# Patient Record
Sex: MALE | Race: WHITE | NOT HISPANIC OR LATINO | Employment: OTHER | URBAN - METROPOLITAN AREA
[De-identification: names, ages, dates, MRNs, and addresses within clinical notes are randomized per-mention and may not be internally consistent; named-entity substitution may affect disease eponyms.]

---

## 2017-01-02 ENCOUNTER — HOSPITAL ENCOUNTER (OUTPATIENT)
Dept: PHYSICAL THERAPY | Facility: HOSPITAL | Age: 67
Setting detail: THERAPIES SERIES
Discharge: HOME | End: 2017-01-02

## 2017-01-02 DIAGNOSIS — Z96.611 S/P REVERSE TOTAL SHOULDER ARTHROPLASTY, RIGHT: Primary | ICD-10-CM

## 2017-01-02 PROCEDURE — 97110 THERAPEUTIC EXERCISES: CPT

## 2017-01-02 PROCEDURE — 97140 MANUAL THERAPY 1/> REGIONS: CPT

## 2017-01-02 NOTE — PROGRESS NOTES
Outpatient Physical Therapy Ortho Treatment Note  [facility]     Patient Name: Russell Christine  : 1950  MRN: [id number]  Today's Date: 2017      Visit Date: 2017    Visit Dx:    ICD-10-CM ICD-9-CM   1. S/p reverse total shoulder arthroplasty, right Z96.611 V43.61       Patient Active Problem List   Diagnosis   • S/p reverse total shoulder arthroplasty        Past Medical History   Diagnosis Date   • Arthritis    • BPH (benign prostatic hyperplasia)    • Depression    • Dizziness      SINCE CRANIOTOMY YRS AGO     • GERD (gastroesophageal reflux disease)    • Hyperlipemia    • Hypertension    • Seizure      ON GABAPENTIN  LAST ONE 5 YRS AGO  HS CRANIOTOMY   • Shoulder pain, right    • Sleep apnea      DOESNT WEAR MACHINE        Past Surgical History   Procedure Laterality Date   • Craniotomy       BENIGN TUMOR BEHIND LEFT EYE     • Lumbar fusion       X 2   • Cervical fusion     • Pr reconstr total shoulder implant Right 10/12/2016     Procedure: RT TOTAL SHOULDER REVERSE  ARTHROPLASTY;  Surgeon: Gilberto Pickard MD;  [facility] [place] [facility];  Service: Orthopedics                             PT Assessment/Plan       17 1609 16 0920 16 0800    PT Assessment    Assessment Comments Pt notes his pain is 0 today.  He has tightness limiting full passive shoulder flexion and has compensatory movement with active flexion. Added lateral deltoid lifts below 90 today, tolerated well.  -JA Pain in right shoulder is well controlled. Working on increasing end range of motion and increase functional strengthening  -WS pt. with 0 pain just a little stiffness  pt, to MD around  or .   -TR    PT Plan    PT Plan Comments continue strengthening including lateral deltoid  -JA  continue with ther ex manual per protocol.   -TR      User Key  (r) = Recorded By, (t) = Taken By, (c) = Cosigned By    Initials Name Provider Type    SHEREEN Upton, PT Physical Therapist  "   TR Jeff Brown, [facility] Physical Therapy Assistant    WS Vicente Crane, RAJENDRA Physical Therapy Assistant                Modalities       01/02/17 0800          Ice    Ice Applied No  -SHEREEN        User Key  (r) = Recorded By, (t) = Taken By, (c) = Cosigned By    Initials Name Provider Type    SHEREEN Upton, PT Physical Therapist                Exercises       01/02/17 0800          Subjective Comments    Subjective Comments I haven't needed any pain medicine.  -SHEREEN      Subjective Pain    Able to rate subjective pain? yes  -SHEREEN      Pre-Treatment Pain Level 0  -SEHREEN      Exercise 1    Exercise Name 1 See flowsheet in chart for exercise details.   -SHEREEN        User Key  (r) = Recorded By, (t) = Taken By, (c) = Cosigned By    Initials Name Provider Type    SHEREEN Upton, PT Physical Therapist                        Manual Rx (last 36 hours)      Manual Treatments       01/02/17 0800          Manual Rx 1    Manual Rx 1 Location R shoulder, supine  -SHEREEN      Manual Rx 1 Type PROM to tolerance flexion, scaption, ER, IR with intermittent gentle oscillations and inferior distraction, Isometric IR/ER (neutral) 5\" x 10 ea (PT providing resistance to monitor), STM ant shoulder to soften tissue adjacent to healing incision.    -      Manual Rx 2    Manual Rx 2 Location R shoulder, arm 90 flexion  -      Manual Rx 2 Type Rhythmic stabilization at 90 degrees, and slow reversal D2  -      Manual Rx 3    Manual Rx 3 Location R shoulder, supine arm at 80 abd   -SHEREEN      Manual Rx 3 Type ER/IR isometrics  -SHEREEN        User Key  (r) = Recorded By, (t) = Taken By, (c) = Cosigned By    Initials Name Provider Type    SHEREEN Upton, PT Physical Therapist                PT OP Goals       01/02/17 0800 12/29/16 0900 12/27/16 0800    PT Short Term Goals    STG Date to Achieve 11/25/16  -SHEREEN 11/25/16  -PARKER 11/25/16  -TR    STG 1 Patient will be safe and independent with initial HEP  -SHEREEN Patient will be safe and " independent with initial HEP  -WS Patient will be safe and independent with initial HEP  -TR    STG 1 Progress Met  -JA Met  -WS Met  -TR    STG 2 Patient will report decreased level of disability as measured by DASH from 69% disability to <50% disability  -JA Patient will report decreased level of disability as measured by DASH from 69% disability to <50% disability  -WS Patient will report decreased level of disability as measured by DASH from 69% disability to <50% disability  -TR    STG 2 Progress Met  -JA Met  -WS Met  -TR    STG 3 Patient will report average daily pain level of 5/10 or less  -JA Patient will report average daily pain level of 5/10 or less  -WS Patient will report average daily pain level of 5/10 or less  -TR    STG 3 Progress Met  -JA Met  -WS Met  -TR    STG 4 pt. to demonstrate R shoulder flexion PROM >/= 0-120 to facilitate ease of performing self care activities  -JA pt. to demonstrate R shoulder flexion PROM >/= 0-120 to facilitate ease of performing self care activities  -WS pt. to demonstrate R shoulder flexion PROM >/= 0-120 to facilitate ease of performing self care activities  -TR    STG 4 Progress Met  -JA Met  -WS Met  -TR    STG 5 pt. to be educated in/verbalize understanding of the importance of posture/ergonomics in association with their condition to facilitate self management of their condition    -JA pt. to be educated in/verbalize understanding of the importance of posture/ergonomics in association with their condition to facilitate self management of their condition    -WS pt. to be educated in/verbalize understanding of the importance of posture/ergonomics in association with their condition to facilitate self management of their condition    -TR    STG 5 Progress Progressing  -JA Progressing  -WS Progressing  -TR    Long Term Goals    LTG Date to Achieve 12/25/16  -JA 12/25/16  -WS 12/25/16  -TR    LTG 1 Patient will report decreased level of disability as measured by  DASH from 69% disability to <30% disability  -JA Patient will report decreased level of disability as measured by DASH from 69% disability to <30% disability  -WS Patient will report decreased level of disability as measured by DASH from 69% disability to <30% disability  -TR    LTG 1 Progress Met  -JA Met  -WS Met  -TR    LTG 2 pt. to demonstrate R shoulder flexion AROM >/= 0-120 to facilitate ease of performing functional/household activities   -JA pt. to demonstrate R shoulder flexion AROM >/= 0-120 to facilitate ease of performing functional/household activities   -WS pt. to demonstrate R shoulder flexion AROM >/= 0-120 to facilitate ease of performing functional/household activities   -TR    LTG 2 Progress Progressing  -JA Progressing  -WS Progressing  -TR    LTG 2 Progress Comments limited AROM  -JA      LTG 3 pt. to demonstrate placing/retrieving small object from an above the shoulder level shelf with her RUE to facilitate ease of performing household/work related activities    -JA pt. to demonstrate placing/retrieving small object from an above the shoulder level shelf with her RUE to facilitate ease of performing household/work related activities    -WS pt. to demonstrate placing/retrieving small object from an above the shoulder level shelf with her RUE to facilitate ease of performing household/work related activities    -TR    LTG 3 Progress Ongoing  -JA Ongoing  -WS Ongoing  -TR    LTG 4 pt. to demonstrate R shoulder AROM FIR >/= mid line belt line to facilitate ease of performing self care/dressing activities  -JA pt. to demonstrate R shoulder AROM FIR >/= mid line belt line to facilitate ease of performing self care/dressing activities  -WS pt. to demonstrate R shoulder AROM FIR >/= mid line belt line to facilitate ease of performing self care/dressing activities  -TR    LTG 4 Progress Ongoing  -JA Ongoing  -WS Ongoing  -TR    LTG 5 pt. to be I with advanced HEP to facilitate self management of their  condition    -JA pt. to be I with advanced HEP to facilitate self management of their condition    -WS pt. to be I with advanced HEP to facilitate self management of their condition    -TR    LTG 5 Progress Ongoing  -JA Ongoing  -WS Ongoing  -TR      12/22/16 0900 12/22/16 0800       PT Short Term Goals    STG Date to Achieve 11/25/16  -KT 11/25/16  -WS     STG 1 Patient will be safe and independent with initial HEP  -KT Patient will be safe and independent with initial HEP  -WS     STG 1 Progress Met  -KT Met  -WS     STG 2 Patient will report decreased level of disability as measured by DASH from 69% disability to <50% disability  -KT Patient will report decreased level of disability as measured by DASH from 69% disability to <50% disability  -WS     STG 2 Progress Met  -KT Ongoing  -WS     STG 2 Progress Comments 11%  -KT      STG 3 Patient will report average daily pain level of 5/10 or less  -KT Patient will report average daily pain level of 5/10 or less  -WS     STG 3 Progress Met  -KT Met  -WS     STG 3 Progress Comments Pain 0  -KT      STG 4 pt. to demonstrate R shoulder flexion PROM >/= 0-120 to facilitate ease of performing self care activities  -KT pt. to demonstrate R shoulder flexion PROM >/= 0-120 to facilitate ease of performing self care activities  -WS     STG 4 Progress Met  -KT Met  -WS     STG 5 pt. to be educated in/verbalize understanding of the importance of posture/ergonomics in association with their condition to facilitate self management of their condition    -KT pt. to be educated in/verbalize understanding of the importance of posture/ergonomics in association with their condition to facilitate self management of their condition    -WS     STG 5 Progress Progressing  -KT Progressing  -WS     Long Term Goals    LTG Date to Achieve 12/25/16  -KT 12/25/16  -WS     LTG 1 Patient will report decreased level of disability as measured by DASH from 69% disability to <30% disability  -KT  Patient will report decreased level of disability as measured by DASH from 69% disability to <30% disability  -WS     LTG 1 Progress Met  -KT Ongoing  -     LTG 2 pt. to demonstrate R shoulder flexion AROM >/= 0-120 to facilitate ease of performing functional/household activities   -KT pt. to demonstrate R shoulder flexion AROM >/= 0-120 to facilitate ease of performing functional/household activities   -WS     LTG 2 Progress Progressing  -KT Progressing  -     LTG 2 Progress Comments Still with compensation above 100 deg--lacks passive motion  -KT      LTG 3 pt. to demonstrate placing/retrieving small object from an above the shoulder level shelf with her RUE to facilitate ease of performing household/work related activities    -KT pt. to demonstrate placing/retrieving small object from an above the shoulder level shelf with her RUE to facilitate ease of performing household/work related activities    -WS     LTG 3 Progress Ongoing  -KT Ongoing  -     LTG 4 pt. to demonstrate R shoulder AROM FIR >/= mid line belt line to facilitate ease of performing self care/dressing activities  -KT pt. to demonstrate R shoulder AROM FIR >/= mid line belt line to facilitate ease of performing self care/dressing activities  -     LTG 4 Progress Ongoing  -KT Ongoing  -     LTG 4 Progress Comments Initiated IR today  -KT      LTG 5 pt. to be I with advanced HEP to facilitate self management of their condition    -KT pt. to be I with advanced HEP to facilitate self management of their condition    -     LTG 5 Progress Ongoing  -KT Ongoing  -     Time Calculation    PT Goal Re-Cert Due Date 01/21/17  -KT        User Key  (r) = Recorded By, (t) = Taken By, (c) = Cosigned By    Initials Name Provider Type    SHEREEN Upton, PT Physical Therapist    MILVIA Brown, PTA Physical Therapy Assistant    PARKER Crane PTA Physical Therapy Assistant    EMILIANO Ruiz, PT Physical Therapist                 Therapy Education       01/02/17 0817    Therapy Education    Given HEP;Symptoms/condition management;Pain management;Posture/body mechanics   added AAROM elevation with eccentric raising (active pull-down shoulder extension with blue tband overhead and then allowed arm to flex with assistance of the band)  -SHEREEN    Program Progressed  -SHEREEN    How Provided Verbal;Demonstration  -JA    Provided to Patient  -JA    Level of Understanding Teach back education performed  -SHEREEN      User Key  (r) = Recorded By, (t) = Taken By, (c) = Cosigned By    Initials Name Provider Type    SHEREEN Upton, PT Physical Therapist                Time Calculation:   Start Time: 0745  Stop Time: 0830  Time Calculation (min): 45 min    Therapy Charges for Today     Code Description Service Date Service Provider Modifiers Qty    [id number]  PT MANUAL THERAPY EA 15 MIN 1/2/2017 Ewa Upton, PT GP 1    [id number] HC PT THER PROC EA 15 MIN 1/2/2017 Ewa Upton PT GP 2                    Ewa Upton PT  1/2/2017

## 2017-01-04 ENCOUNTER — APPOINTMENT (OUTPATIENT)
Dept: PHYSICAL THERAPY | Facility: HOSPITAL | Age: 67
End: 2017-01-04

## 2017-01-05 ENCOUNTER — APPOINTMENT (OUTPATIENT)
Dept: PHYSICAL THERAPY | Facility: HOSPITAL | Age: 67
End: 2017-01-05

## 2017-01-09 ENCOUNTER — HOSPITAL ENCOUNTER (OUTPATIENT)
Dept: PHYSICAL THERAPY | Facility: HOSPITAL | Age: 67
Setting detail: THERAPIES SERIES
Discharge: HOME | End: 2017-01-09

## 2017-01-09 DIAGNOSIS — Z96.611 S/P REVERSE TOTAL SHOULDER ARTHROPLASTY, RIGHT: Primary | ICD-10-CM

## 2017-01-09 PROCEDURE — 97110 THERAPEUTIC EXERCISES: CPT

## 2017-01-09 PROCEDURE — 97140 MANUAL THERAPY 1/> REGIONS: CPT

## 2017-01-09 NOTE — PROGRESS NOTES
Outpatient Physical Therapy Ortho Treatment Note  [facility]     Patient Name: Russell Christine  : 1950  MRN: [id number]  Today's Date: 2017      Visit Date: 2017    Visit Dx:    ICD-10-CM ICD-9-CM   1. S/p reverse total shoulder arthroplasty, right Z96.611 V43.61       Patient Active Problem List   Diagnosis   • S/p reverse total shoulder arthroplasty        Past Medical History   Diagnosis Date   • Arthritis    • BPH (benign prostatic hyperplasia)    • Depression    • Dizziness      SINCE CRANIOTOMY YRS AGO     • GERD (gastroesophageal reflux disease)    • Hyperlipemia    • Hypertension    • Seizure      ON GABAPENTIN  LAST ONE 5 YRS AGO  HS CRANIOTOMY   • Shoulder pain, right    • Sleep apnea      DOESNT WEAR MACHINE        Past Surgical History   Procedure Laterality Date   • Craniotomy       BENIGN TUMOR BEHIND LEFT EYE     • Lumbar fusion       X 2   • Cervical fusion     • Pr reconstr total shoulder implant Right 10/12/2016     Procedure: RT TOTAL SHOULDER REVERSE  ARTHROPLASTY;  Surgeon: Gilberto Pickard MD;  [place] [facility] [place] [facility] [place]: [place]opedics                             PT Assessment/Plan       17 0852 17 1609       PT Assessment    Assessment Comments Pt reports stiffness and tightness is his primary issue and does not have pain with active elevation.  Added shelf reach without weight and cued to keep elbow in to control compesation. Pt feels he has regained significant function back since his surgery.  He sees his MD next week.  -SHEREEN Pt notes his pain is 0 today.  He has tightness limiting full passive shoulder flexion and has compensatory movement with active flexion. Added lateral deltoid lifts below 90 today, tolerated well.  -SHEREEN     PT Plan    PT Plan Comments work on scapulohumeral rhythm, cue ret with shoulder elevation to reduce compensation, no delt lifts for now, cont manual stretching, strengthening, stabilization  -SHEREEN continue  "strengthening including lateral deltoid  -       User Key  (r) = Recorded By, (t) = Taken By, (c) = Cosigned By    Initials Name Provider Type    SHEREEN Upton, ALEJANDRA Physical Therapist                Modalities       17 0700          Subjective Comments    Subjective Comments Not having any pain, just stiffness.   Pt missed appt last week due to death in family and .  -JA      Subjective Pain    Able to rate subjective pain? yes  -SHEREEN      Pre-Treatment Pain Level 0  -SHEREEN        User Key  (r) = Recorded By, (t) = Taken By, (c) = Cosigned By    Initials Name Provider Type    SHEREEN Upton, PT Physical Therapist                Exercises       17 0700          Subjective Comments    Subjective Comments Not having any pain, just stiffness.   Pt missed appt last week due to death in family and .  -JA      Subjective Pain    Able to rate subjective pain? yes  -      Pre-Treatment Pain Level 0  -SHEREEN      Exercise 1    Exercise Name 1 See flowsheet in chart for exercise details.   -SHEREEN        User Key  (r) = Recorded By, (t) = Taken By, (c) = Cosigned By    Initials Name Provider Type    SHEREEN Upton, PT Physical Therapist                        Manual Rx (last 36 hours)      Manual Treatments       17 0700          Manual Rx 1    Manual Rx 1 Location R shoulder, supine  -      Manual Rx 1 Type PROM to tolerance flexion, scaption, ER, IR with intermittent gentle oscillations and inferior distraction, Isometric IR/ER (neutral) 5\" x 10 ea (PT providing resistance to monitor), STM ant shoulder to soften tissue adjacent to healing incision.  Added light resistance w/D2.  -      Manual Rx 2    Manual Rx 2 Location R shoulder, arm 90 flexion  -      Manual Rx 2 Type Rhythmic stabilization at 90 degrees, and slow reversal D2  -      Manual Rx 3    Manual Rx 3 Location R shoulder, supine arm at 80 abd   -      Manual Rx 3 Type ER/IR isometrics  -        User Key  " (r) = Recorded By, (t) = Taken By, (c) = Cosigned By    Initials Name Provider Type    SHEREEN Upton, PT Physical Therapist                PT OP Goals       01/09/17 0800 01/02/17 0800 12/29/16 0900    PT Short Term Goals    STG Date to Achieve 11/25/16  -JA 11/25/16  -JA 11/25/16  -WS    STG 1 Patient will be safe and independent with initial HEP  -JA Patient will be safe and independent with initial HEP  -JA Patient will be safe and independent with initial HEP  -WS    STG 1 Progress Met  -JA Met  -JA Met  -WS    STG 2 Patient will report decreased level of disability as measured by DASH from 69% disability to <50% disability  -JA Patient will report decreased level of disability as measured by DASH from 69% disability to <50% disability  -JA Patient will report decreased level of disability as measured by DASH from 69% disability to <50% disability  -WS    STG 2 Progress Met  -JA Met  -JA Met  -    STG 3 Patient will report average daily pain level of 5/10 or less  -JA Patient will report average daily pain level of 5/10 or less  -JA Patient will report average daily pain level of 5/10 or less  -WS    STG 3 Progress Met  -JA Met  -JA Met  -    STG 4 pt. to demonstrate R shoulder flexion PROM >/= 0-120 to facilitate ease of performing self care activities  -JA pt. to demonstrate R shoulder flexion PROM >/= 0-120 to facilitate ease of performing self care activities  -JA pt. to demonstrate R shoulder flexion PROM >/= 0-120 to facilitate ease of performing self care activities  -    STG 4 Progress Met  -JA Met  -JA Met  -    STG 5 pt. to be educated in/verbalize understanding of the importance of posture/ergonomics in association with their condition to facilitate self management of their condition    -JA pt. to be educated in/verbalize understanding of the importance of posture/ergonomics in association with their condition to facilitate self management of their condition    -JA pt. to be educated  in/verbalize understanding of the importance of posture/ergonomics in association with their condition to facilitate self management of their condition    -    STG 5 Progress Progressing  -JA Progressing  -JA Progressing  -    Long Term Goals    LTG Date to Achieve 12/25/16  -JA 12/25/16  -JA 12/25/16  -WS    LTG 1 Patient will report decreased level of disability as measured by DASH from 69% disability to <30% disability  -JA Patient will report decreased level of disability as measured by DASH from 69% disability to <30% disability  -JA Patient will report decreased level of disability as measured by DASH from 69% disability to <30% disability  -WS    LTG 1 Progress Met  -JA Met  -JA Met  -    LTG 2 pt. to demonstrate R shoulder flexion AROM >/= 0-120 to facilitate ease of performing functional/household activities   -JA pt. to demonstrate R shoulder flexion AROM >/= 0-120 to facilitate ease of performing functional/household activities   -JA pt. to demonstrate R shoulder flexion AROM >/= 0-120 to facilitate ease of performing functional/household activities   -    LTG 2 Progress Progressing  -JA Progressing  -JA Progressing  -WS    LTG 2 Progress Comments  limited AROM  -     LTG 3 pt. to demonstrate placing/retrieving small object from an above the shoulder level shelf with her RUE to facilitate ease of performing household/work related activities    -JA pt. to demonstrate placing/retrieving small object from an above the shoulder level shelf with her RUE to facilitate ease of performing household/work related activities    -JA pt. to demonstrate placing/retrieving small object from an above the shoulder level shelf with her RUE to facilitate ease of performing household/work related activities    -    LTG 3 Progress Ongoing  -JA Ongoing  -JA Ongoing  -    LTG 4 pt. to demonstrate R shoulder AROM FIR >/= mid line belt line to facilitate ease of performing self care/dressing activities  -JA pt. to  demonstrate R shoulder AROM FIR >/= mid line belt line to facilitate ease of performing self care/dressing activities  -JA pt. to demonstrate R shoulder AROM FIR >/= mid line belt line to facilitate ease of performing self care/dressing activities  -WS    LTG 4 Progress Ongoing  -JA Ongoing  -JA Ongoing  -    LTG 5 pt. to be I with advanced HEP to facilitate self management of their condition    -JA pt. to be I with advanced HEP to facilitate self management of their condition    -JA pt. to be I with advanced HEP to facilitate self management of their condition    -    LTG 5 Progress Ongoing  -JA Ongoing  -JA Ongoing  -      12/27/16 0800          PT Short Term Goals    STG Date to Achieve 11/25/16  -TR      STG 1 Patient will be safe and independent with initial HEP  -TR      STG 1 Progress Met  -TR      STG 2 Patient will report decreased level of disability as measured by DASH from 69% disability to <50% disability  -TR      STG 2 Progress Met  -TR      STG 3 Patient will report average daily pain level of 5/10 or less  -TR      STG 3 Progress Met  -TR      STG 4 pt. to demonstrate R shoulder flexion PROM >/= 0-120 to facilitate ease of performing self care activities  -TR      STG 4 Progress Met  -TR      STG 5 pt. to be educated in/verbalize understanding of the importance of posture/ergonomics in association with their condition to facilitate self management of their condition    -TR      STG 5 Progress Progressing  -TR      Long Term Goals    LTG Date to Achieve 12/25/16  -TR      LTG 1 Patient will report decreased level of disability as measured by DASH from 69% disability to <30% disability  -TR      LTG 1 Progress Met  -TR      LTG 2 pt. to demonstrate R shoulder flexion AROM >/= 0-120 to facilitate ease of performing functional/household activities   -TR      LTG 2 Progress Progressing  -TR      LTG 3 pt. to demonstrate placing/retrieving small object from an above the shoulder level shelf with her  RUE to facilitate ease of performing household/work related activities    -TR      LTG 3 Progress Ongoing  -TR      LTG 4 pt. to demonstrate R shoulder AROM FIR >/= mid line belt line to facilitate ease of performing self care/dressing activities  -TR      LTG 4 Progress Ongoing  -TR      LTG 5 pt. to be I with advanced HEP to facilitate self management of their condition    -TR      LTG 5 Progress Ongoing  -TR        User Key  (r) = Recorded By, (t) = Taken By, (c) = Cosigned By    Initials Name Provider Type    SHEREEN Upton PT Physical Therapist    TR Jeff Brown, [facility] Physical Therapy Assistant    PARKER [employer] [facility] Physical Therapy Assistant                Therapy Education       01/09/17 0807    Therapy Education    Given HEP;Symptoms/condition management;Pain management;Posture/body mechanics   discussed/reinforced posture to reduce strain on shoulder  -SHEREEN    Program Progressed  -SHEREEN    How Provided Verbal;Demonstration  -SHEREEN    Provided to Patient  -JA    Level of Understanding Teach back education performed  -SHEREEN      User Key  (r) = Recorded By, (t) = Taken By, (c) = Cosigned By    Initials Name Provider Type    SHEREEN Upton PT Physical Therapist                Time Calculation:   Start Time: 0745  Stop Time: 0830  Time Calculation (min): 45 min    Therapy Charges for Today     Code Description Service Date Service Provider Modifiers Qty    94100790802 HC PT THER PROC EA 15 MIN 1/9/2017 Ewa Upton, PT GP 2    42879385673 HC PT MANUAL THERAPY EA 15 MIN 1/9/2017 Ewa Upton PT GP 1                    Ewa Upton PT  1/9/2017

## 2017-01-12 ENCOUNTER — HOSPITAL ENCOUNTER (OUTPATIENT)
Dept: PHYSICAL THERAPY | Facility: HOSPITAL | Age: 67
Setting detail: THERAPIES SERIES
Discharge: HOME | End: 2017-01-12

## 2017-01-12 DIAGNOSIS — Z96.611 S/P REVERSE TOTAL SHOULDER ARTHROPLASTY, RIGHT: Primary | ICD-10-CM

## 2017-01-12 PROCEDURE — 97140 MANUAL THERAPY 1/> REGIONS: CPT

## 2017-01-12 PROCEDURE — 97110 THERAPEUTIC EXERCISES: CPT

## 2017-01-14 NOTE — PROGRESS NOTES
Outpatient Physical Therapy Ortho Treatment Note  [facility]     Patient Name: Russell Christine  : 1950  MRN: [id number]  Today's Date: 2017      Visit Date: 2017    Visit Dx:    ICD-10-CM ICD-9-CM   1. S/p reverse total shoulder arthroplasty, right Z96.611 V43.61       Patient Active Problem List   Diagnosis   • S/p reverse total shoulder arthroplasty        Past Medical History   Diagnosis Date   • Arthritis    • BPH (benign prostatic hyperplasia)    • Depression    • Dizziness      SINCE CRANIOTOMY YRS AGO     • GERD (gastroesophageal reflux disease)    • Hyperlipemia    • Hypertension    • Seizure      ON GABAPENTIN  LAST ONE 5 YRS AGO  HS CRANIOTOMY   • Shoulder pain, right    • Sleep apnea      DOESNT WEAR MACHINE        Past Surgical History   Procedure Laterality Date   • Craniotomy       BENIGN TUMOR BEHIND LEFT EYE     • Lumbar fusion       X 2   • Cervical fusion     • Pr reconstr total shoulder implant Right 10/12/2016     Procedure: RT TOTAL SHOULDER REVERSE  ARTHROPLASTY;  Surgeon: Gilberto Pickard MD;  [place] [facility] [place] [facility] [place]  Service: Orthopedics                             PT Assessment/Plan       17 1651 17 0852       PT Assessment    Assessment Comments Pt has been progressing well and continues to report minimal/no pain in shoulder.  He compensates with shoulder elevation, we are continuing to work on that with scapulohumeral strengthening/stabilization.  -SHEREEN Pt reports stiffness and tightness is his primary issue and does not have pain with active elevation.  Added shelf reach without weight and cued to keep elbow in to control compesation. Pt feels he has regained significant function back since his surgery.  He sees his MD next week.  -SHEREEN     PT Plan    PT Plan Comments scapulohumeral rhythm, cont to work on scapular stabilzers during elevation to decrease compensation, cont manual stretching and strengthening.  -SHEREEN work on scapulohumeral rhythm,  cue ret with shoulder elevation to reduce compensation, no delt lifts for now, cont manual stretching, strengthening, stabilization  -       User Key  (r) = Recorded By, (t) = Taken By, (c) = Cosigned By    Initials Name Provider Type    SHEREEN Upton, PT Physical Therapist                                       PT OP Goals       01/09/17 0800 01/02/17 0800       PT Short Term Goals    STG Date to Achieve 11/25/16  - 11/25/16  -     STG 1 Patient will be safe and independent with initial HEP  - Patient will be safe and independent with initial HEP  -     STG 1 Progress Met  - Met  -     STG 2 Patient will report decreased level of disability as measured by DASH from 69% disability to <50% disability  - Patient will report decreased level of disability as measured by DASH from 69% disability to <50% disability  -     STG 2 Progress Met  -JA Met  -     STG 3 Patient will report average daily pain level of 5/10 or less  - Patient will report average daily pain level of 5/10 or less  -     STG 3 Progress [facility]     STG 4 pt. to demonstrate R shoulder flexion PROM >/= 0-120 to facilitate ease of performing self care activities  - pt. to demonstrate R shoulder flexion PROM >/= 0-120 to facilitate ease of performing self care activities  -     STG 4 Progress [facility]     STG 5 pt. to be educated in/verbalize understanding of the importance of posture/ergonomics in association with their condition to facilitate self management of their condition    - pt. to be educated in/verbalize understanding of the importance of posture/ergonomics in association with their condition to facilitate self management of their condition    -     STG 5 Progress Progressing  - Progressing  -     Long Term Goals    LTG Date to Achieve 12/25/16  - 12/25/16  -     LTG 1 Patient will report decreased level of disability as measured by DASH from 69% disability to <30% disability  -  Patient will report decreased level of disability as measured by DASH from 69% disability to <30% disability  -JA     LTG 1 Progress Met  -JA Met  -JA     LTG 2 pt. to demonstrate R shoulder flexion AROM >/= 0-120 to facilitate ease of performing functional/household activities   -JA pt. to demonstrate R shoulder flexion AROM >/= 0-120 to facilitate ease of performing functional/household activities   -JA     LTG 2 Progress Progressing  -JA Progressing  -JA     LTG 2 Progress Comments  limited AROM  -JA     LTG 3 pt. to demonstrate placing/retrieving small object from an above the shoulder level shelf with her RUE to facilitate ease of performing household/work related activities    -JA pt. to demonstrate placing/retrieving small object from an above the shoulder level shelf with her RUE to facilitate ease of performing household/work related activities    -JA     LTG 3 Progress Ongoing  -JA Ongoing  -     LTG 4 pt. to demonstrate R shoulder AROM FIR >/= mid line belt line to facilitate ease of performing self care/dressing activities  -JA pt. to demonstrate R shoulder AROM FIR >/= mid line belt line to facilitate ease of performing self care/dressing activities  -JA     LTG 4 Progress Ongoing  -JA Ongoing  -     LTG 5 pt. to be I with advanced HEP to facilitate self management of their condition    - pt. to be I with advanced HEP to facilitate self management of their condition    -     LTG 5 Progress Ongoing  - Ongoing  -       User Key  (r) = Recorded By, (t) = Taken By, (c) = Cosigned By    Initials Name Provider Type    SHEREEN Upton, PT Physical Therapist                Therapy Education       01/12/17 3924    Therapy Education    Given HEP;Symptoms/condition management;Pain management;Posture/body mechanics   added tband pull down for AAROM flexion  -    Program Progressed  -JA    How Provided Verbal;Demonstration  -JA    Provided to Patient  -SHEREEN    Level of Understanding Teach back  education performed  -SHEREEN      01/09/17 0807    Therapy Education    Given HEP;Symptoms/condition management;Pain management;Posture/body mechanics   discussed/reinforced posture to reduce strain on shoulder  -SHEREEN    Program Progressed  -SHEREEN    How Provided Verbal;Demonstration  -SHEREEN    Provided to Patient  -SHEREEN    Level of Understanding Teach back education performed  -SHEREEN      User Key  (r) = Recorded By, (t) = Taken By, (c) = Cosigned By    Initials Name Provider Type    SHEREEN Upton, PT Physical Therapist                Time Calculation:   Start Time: 0745  Stop Time: 0830  Time Calculation (min): 45 min                  Ewa Upton, PT  1/12/2017

## 2017-01-16 ENCOUNTER — HOSPITAL ENCOUNTER (OUTPATIENT)
Dept: PHYSICAL THERAPY | Facility: HOSPITAL | Age: 67
Setting detail: THERAPIES SERIES
Discharge: HOME | End: 2017-01-16

## 2017-01-16 DIAGNOSIS — Z96.611 S/P REVERSE TOTAL SHOULDER ARTHROPLASTY, RIGHT: Primary | ICD-10-CM

## 2017-01-16 PROCEDURE — 97140 MANUAL THERAPY 1/> REGIONS: CPT

## 2017-01-16 PROCEDURE — 97110 THERAPEUTIC EXERCISES: CPT

## 2017-01-16 NOTE — PROGRESS NOTES
Outpatient Physical Therapy Ortho Treatment Note  [facility]     Patient Name: Russell Christine  : 1950  MRN: [id number]  Today's Date: 2017      Visit Date: 2017    Visit Dx:    ICD-10-CM ICD-9-CM   1. S/p reverse total shoulder arthroplasty, right Z96.611 V43.61       Patient Active Problem List   Diagnosis   • S/p reverse total shoulder arthroplasty        Past Medical History   Diagnosis Date   • Arthritis    • BPH (benign prostatic hyperplasia)    • Depression    • Dizziness      SINCE CRANIOTOMY YRS AGO     • GERD (gastroesophageal reflux disease)    • Hyperlipemia    • Hypertension    • Seizure      ON GABAPENTIN  LAST ONE 5 YRS AGO  HS CRANIOTOMY   • Shoulder pain, right    • Sleep apnea      DOESNT WEAR MACHINE        Past Surgical History   Procedure Laterality Date   • Craniotomy       BENIGN TUMOR BEHIND LEFT EYE     • Lumbar fusion       X 2   • Cervical fusion     • Pr reconstr total shoulder implant Right 10/12/2016     Procedure: RT TOTAL SHOULDER REVERSE  ARTHROPLASTY;  Surgeon: Gilberto Pickard MD;  Location: [facility] [place];  Service: Orthopedics             PT Ortho       17 0800    Right Shoulder    Flexion AROM Deficit 125 deg  -JA    Flexion PROM Deficit 135 deg  -JA    ABduction AROM Deficit 95  -JA    External Rotation AROM Deficit 70  -JA      User Key  (r) = Recorded By, (t) = Taken By, (c) = Cosigned By    Initials Name Provider Type    SHEREEN Upton, PT Physical Therapist                            PT Assessment/Plan       17 1530 17 1651       PT Assessment    Assessment Comments Pt has been seen for a total of 20 visits since initial visit on 10/26/16, s/p R reverse TSA on 10/12/16.  He demonstrates increasing A/PROM and has begun reaching above his head more often, however he demonstrates some compensatory substitution of his UT when elevating greater than 90. He reports he has stiffness more than pain but does seem to get some  "impingement with shoulder elevation.  He may benefit from continuing therapy for 2 more weeks with transition to HEP and self-management.  -SHEREEN Pt has been progressing well and continues to report minimal/no pain in shoulder.  He compensates with shoulder elevation, we are continuing to work on that with scapulohumeral strengthening/stabilization.  -SHEREEN     PT Plan    PT Plan Comments Resume scapulohumeral rhythm, cont to work on scapular stabilizers during elevation to decrease compensation, work on reaching into cabinet  -SHEREEN scapulohumeral rhythm, cont to work on scapular stabilzers during elevation to decrease compensation, cont manual stretching and strengthening.  -SHEREEN       User Key  (r) = Recorded By, (t) = Taken By, (c) = Cosigned By    Initials Name Provider Type    SHEREEN Upton PT Physical Therapist                    Exercises       01/16/17 0800          Subjective Comments    Subjective Comments Stiffer since I've been taking care of my mom.  I have to help move her.  -SHEREEN      Subjective Pain    Able to rate subjective pain? yes  -SHEREEN      Pre-Treatment Pain Level 0  -SHEREEN      Exercise 1    Exercise Name 1 See flowsheet in chart for exercise details.   -SHEREEN        User Key  (r) = Recorded By, (t) = Taken By, (c) = Cosigned By    Initials Name Provider Type    SHEREEN Upton PT Physical Therapist                        Manual Rx (last 36 hours)      Manual Treatments       01/16/17 0800          Manual Rx 1    Manual Rx 1 Location R shoulder, supine  -SHEREEN      Manual Rx 1 Type PROM to tolerance flexion, scaption, ER, IR with intermittent gentle oscillations and inferior distraction, Isometric IR/ER (neutral) 5\" x 10 ea (PT providing resistance to monitor), STM ant shoulder to soften tissue adjacent to healing incision.  Added light resistance w/D2.  -SHEREEN        User Key  (r) = Recorded By, (t) = Taken By, (c) = Cosigned By    Initials Name Provider Type    SHEREEN Upton PT Physical " Therapist                PT OP Goals       01/16/17 0800 01/09/17 0800       PT Short Term Goals    STG Date to Achieve 11/25/16  -JA 11/25/16  -JA     STG 1 Patient will be safe and independent with initial HEP  -JA Patient will be safe and independent with initial HEP  -JA     STG 1 Progress Met  - Met  -     STG 2 Patient will report decreased level of disability as measured by DASH from 69% disability to <50% disability  -JA Patient will report decreased level of disability as measured by DASH from 69% disability to <50% disability  -JA     STG 2 Progress Met  - Met  -     STG 3 Patient will report average daily pain level of 5/10 or less  -JA Patient will report average daily pain level of 5/10 or less  -JA     STG 3 Progress Met  - Met  [facility]     STG 4 pt. to demonstrate R shoulder flexion PROM >/= 0-120 to facilitate ease of performing self care activities  -JA pt. to demonstrate R shoulder flexion PROM >/= 0-120 to facilitate ease of performing self care activities  -     STG 4 Progress Met  -[facility]  -     STG 5 pt. to be educated in/verbalize understanding of the importance of posture/ergonomics in association with their condition to facilitate self management of their condition    - pt. to be educated in/verbalize understanding of the importance of posture/ergonomics in association with their condition to facilitate self management of their condition    -     STG 5 Progress Progressing  - Progressing  -     Long Term Goals    LTG Date to Achieve 12/25/16  -JA 12/25/16  -     LTG 1 Patient will report decreased level of disability as measured by DASH from 69% disability to <30% disability  -JA Patient will report decreased level of disability as measured by DASH from 69% disability to <30% disability  -     LTG 1 Progress Met  -JA Met  [facility]     LTG 2 pt. to demonstrate R shoulder flexion AROM >/= 0-120 to facilitate ease of performing functional/household activities   - pt. to  demonstrate R shoulder flexion AROM >/= 0-120 to facilitate ease of performing functional/household activities   -JA     LTG 2 Progress Progressing  -JA Progressing  -JA     LTG 2 Progress Comments Flexion 125 degrees with mild compensation.  -JA      LTG 3 pt. to demonstrate placing/retrieving small object from an above the shoulder level shelf with RUE to facilitate ease of performing household/work related activities    -SHEREEN pt. to demonstrate placing/retrieving small object from an above the shoulder level shelf with her RUE to facilitate ease of performing household/work related activities    -JA     LTG 3 Progress Progressing  -JA Ongoing  -JA     LTG 3 Progress Comments able to reach shelf slightly above his head.  -SHEREEN      LTG 4 pt. to demonstrate R shoulder AROM FIR >/= mid line belt line to facilitate ease of performing self care/dressing activities  -JA pt. to demonstrate R shoulder AROM FIR >/= mid line belt line to facilitate ease of performing self care/dressing activities  -JA     LTG 4 Progress Ongoing  -JA Ongoing  -JA     LTG 5 pt. to be I with advanced HEP to facilitate self management of their condition    -SHEREEN pt. to be I with advanced HEP to facilitate self management of their condition    -SHEREEN     LTG 5 Progress Ongoing  -JA Ongoing  -JA       User Key  (r) = Recorded By, (t) = Taken By, (c) = Cosigned By    Initials Name Provider Type    SHEREEN Upton, PT Physical Therapist                Therapy Education       01/16/17 1529    Therapy Education    Given HEP;Symptoms/condition management;Pain management;Posture/body mechanics   Cued scapular stabilization particularly during shoulder elevation ex's (w/tactile and verbal cues).  -SHEREEN    Program Progressed  -SHEREEN    Provided to Patient  -SHEREEN    Level of Understanding Teach back education performed  -SHEREEN      01/12/17 1650    Therapy Education    Given HEP;Symptoms/condition management;Pain management;Posture/body mechanics   added tband pull  down for AAROM flexion  -JA    Program Progressed  -SHEREEN    How Provided Verbal;Demonstration  -JA    Provided to Patient  -JA    Level of Understanding Teach back education performed  -JA      User Key  (r) = Recorded By, (t) = Taken By, (c) = Cosigned By    Initials Name Provider Type    SHEREEN Upton, PT Physical Therapist                Time Calculation:   Start Time: 0745  Stop Time: 0831  Time Calculation (min): 46 min    Therapy Charges for Today     Code Description Service Date Service Provider Modifiers Qty    [id number]  PT THER PROC EA 15 MIN 1/16/2017 Ewa Upton, PT GP 2    [id number]  PT MANUAL THERAPY EA 15 MIN 1/16/2017 Ewa Upton, PT GP 1                    Ewa Upton PT  1/16/2017

## 2017-01-19 ENCOUNTER — APPOINTMENT (OUTPATIENT)
Dept: PHYSICAL THERAPY | Facility: HOSPITAL | Age: 67
End: 2017-01-19

## 2017-01-23 ENCOUNTER — APPOINTMENT (OUTPATIENT)
Dept: PHYSICAL THERAPY | Facility: HOSPITAL | Age: 67
End: 2017-01-23

## 2017-01-23 ENCOUNTER — DOCUMENTATION (OUTPATIENT)
Dept: PHYSICAL THERAPY | Facility: HOSPITAL | Age: 67
End: 2017-01-23

## 2017-01-26 ENCOUNTER — APPOINTMENT (OUTPATIENT)
Dept: PHYSICAL THERAPY | Facility: HOSPITAL | Age: 67
End: 2017-01-26

## 2017-02-09 ENCOUNTER — DOCUMENTATION (OUTPATIENT)
Dept: PHYSICAL THERAPY | Facility: HOSPITAL | Age: 67
End: 2017-02-09

## 2017-02-09 NOTE — THERAPY DISCHARGE NOTE
Outpatient Physical Therapy Discharge Summary         Patient Name: Russell Christine  : 1950  MRN: [id number]    Today's Date: 2017    Visit Dx:  No diagnosis found.          PT OP Goals       17 0700          PT Short Term Goals    STG Date to Achieve 16  -      STG 1 Patient will be safe and independent with initial HEP  -      STG 1 Progress Met  -      STG 2 Patient will report decreased level of disability as measured by DASH from 69% disability to <50% disability  -      STG 2 Progress Met  -      STG 3 Patient will report average daily pain level of 5/10 or less  -      STG 3 Progress Met  -      STG 4 pt. to demonstrate R shoulder flexion PROM >/= 0-120 to facilitate ease of performing self care activities  -      STG 4 Progress Met  -[facility] 5 pt. to be educated in/verbalize understanding of the importance of posture/ergonomics in association with their condition to facilitate self management of their condition    -      STG 5 Progress Met  [facility]      Long Term Goals    LTG Date to Achieve 16  -      LTG 1 Patient will report decreased level of disability as measured by DASH from 69% disability to <30% disability  -      LTG 1 Progress Met  -      LTG 2 pt. to demonstrate R shoulder flexion AROM >/= 0-120 to facilitate ease of performing functional/household activities   -      LTG 2 Progress Met  [facility]      LTG 3 pt. to demonstrate placing/retrieving small object from an above the shoulder level shelf with RUE to facilitate ease of performing household/work related activities    -      LTG 3 Progress Met  -      LTG 4 pt. to demonstrate R shoulder AROM FIR >/= mid line belt line to facilitate ease of performing self care/dressing activities  -      LTG 4 Progress Progressing  -      LTG 5 pt. to be I with advanced HEP to facilitate self management of their condition    -      LTG 5 Progress Met  -JA        User Key  (r) = Recorded By, (t)  = Taken By, (c) = Cosigned By    Initials Name Provider Type    SHEREEN Upton, PT Physical Therapist          OP PT Discharge Summary: Pt was seen s/p R TSA (surgery 10/12/16) for a total of 20 visits for shoulder rehab.  He is independent with all ADLs and advanced HEP and ready for discharge.  Date of Discharge: 02/09/17  Reason for Discharge: All goals achieved  Outcomes Achieved: Able to achieve all goals within established timeline  Discharge Destination: Home with home program  Discharge Instructions: Spoke with pt after he missed an appointment following his visit with his surgeon and he reports the surgeon said he didn't need to return to therapy.  Pt was independent with HEP and ready to continue on his own.      Time Calculation:                    Ewa Upton, PT  2/9/2017

## 2017-04-30 ENCOUNTER — APPOINTMENT (OUTPATIENT)
Dept: GENERAL RADIOLOGY | Facility: HOSPITAL | Age: 67
End: 2017-04-30

## 2017-04-30 ENCOUNTER — HOSPITAL ENCOUNTER (EMERGENCY)
Facility: HOSPITAL | Age: 67
Discharge: HOME | End: 2017-04-30
Attending: EMERGENCY MEDICINE | Admitting: EMERGENCY MEDICINE

## 2017-04-30 VITALS
BODY MASS INDEX: 31.67 KG/M2 | HEIGHT: 68 IN | TEMPERATURE: 98.5 F | WEIGHT: 209 LBS | DIASTOLIC BLOOD PRESSURE: 93 MMHG | RESPIRATION RATE: 17 BRPM | SYSTOLIC BLOOD PRESSURE: 127 MMHG | OXYGEN SATURATION: 97 % | HEART RATE: 56 BPM

## 2017-04-30 DIAGNOSIS — S70.01XA CONTUSION OF RIGHT HIP, INITIAL ENCOUNTER: ICD-10-CM

## 2017-04-30 DIAGNOSIS — S40.011A CONTUSION OF RIGHT SHOULDER, INITIAL ENCOUNTER: Primary | ICD-10-CM
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
## 2017-04-30 PROCEDURE — 73030 X-RAY EXAM OF SHOULDER: CPT

## 2017-04-30 PROCEDURE — 73502 X-RAY EXAM HIP UNI 2-3 VIEWS: CPT

## 2017-04-30 PROCEDURE — 99284 EMERGENCY DEPT VISIT MOD MDM: CPT

## 2017-04-30 RX ORDER — OXYCODONE HYDROCHLORIDE AND ACETAMINOPHEN 5; 325 MG/1; MG/1
2 TABLET ORAL EVERY 4 HOURS PRN
Qty: 14 TABLET | Refills: 0 | OUTPATIENT
Start: 2017-04-30 | End: 2019-06-14

## 2017-04-30 RX ORDER — OXYCODONE HYDROCHLORIDE AND ACETAMINOPHEN 5; 325 MG/1; MG/1
1 TABLET ORAL ONCE
Status: COMPLETED | OUTPATIENT
Start: 2017-04-30 | End: 2017-04-30

## 2017-04-30 RX ORDER — ERGOCALCIFEROL 1.25 MG/1
50000 CAPSULE ORAL WEEKLY
COMMUNITY
End: 2022-08-02

## 2017-04-30 RX ADMIN — OXYCODONE HYDROCHLORIDE AND ACETAMINOPHEN 1 TABLET: 5; 325 TABLET ORAL at 14:33

## 2019-06-13 ENCOUNTER — APPOINTMENT (OUTPATIENT)
Dept: GENERAL RADIOLOGY | Facility: HOSPITAL | Age: 69
End: 2019-06-13

## 2019-06-13 ENCOUNTER — HOSPITAL ENCOUNTER (EMERGENCY)
Facility: HOSPITAL | Age: 69
Discharge: HOME | End: 2019-06-14
Attending: EMERGENCY MEDICINE | Admitting: EMERGENCY MEDICINE

## 2019-06-13 DIAGNOSIS — M54.32 SCIATICA OF LEFT SIDE: Primary | ICD-10-CM

## 2019-06-13 PROCEDURE — 99283 EMERGENCY DEPT VISIT LOW MDM: CPT

## 2019-06-13 PROCEDURE — 96372 THER/PROPH/DIAG INJ SC/IM: CPT

## 2019-06-13 PROCEDURE — 25010000002 DEXAMETHASONE SODIUM PHOSPHATE 20 MG/5ML SOLUTION: Performed by: EMERGENCY MEDICINE

## 2019-06-13 PROCEDURE — 72110 X-RAY EXAM L-2 SPINE 4/>VWS: CPT

## 2019-06-13 RX ORDER — DEXAMETHASONE SODIUM PHOSPHATE 4 MG/ML
10 INJECTION, SOLUTION INTRA-ARTICULAR; INTRALESIONAL; INTRAMUSCULAR; INTRAVENOUS; SOFT TISSUE ONCE
Status: COMPLETED | OUTPATIENT
Start: 2019-06-13 | End: 2019-06-13

## 2019-06-13 RX ADMIN — DEXAMETHASONE SODIUM PHOSPHATE 10 MG: 4 INJECTION, SOLUTION INTRA-ARTICULAR; INTRALESIONAL; INTRAMUSCULAR; INTRAVENOUS; SOFT TISSUE at 23:41

## 2019-06-14 VITALS
BODY MASS INDEX: 31.78 KG/M2 | RESPIRATION RATE: 16 BRPM | DIASTOLIC BLOOD PRESSURE: 69 MMHG | HEIGHT: 68 IN | HEART RATE: 57 BPM | TEMPERATURE: 98.6 F | SYSTOLIC BLOOD PRESSURE: 142 MMHG | OXYGEN SATURATION: 94 %

## 2019-06-14 RX ORDER — HYDROCODONE BITARTRATE AND ACETAMINOPHEN 7.5; 325 MG/1; MG/1
1 TABLET ORAL ONCE
Status: COMPLETED | OUTPATIENT
Start: 2019-06-14 | End: 2019-06-14

## 2019-06-14 RX ORDER — CYCLOBENZAPRINE HCL 10 MG
10 TABLET ORAL 3 TIMES DAILY
Qty: 15 TABLET | Refills: 0 | Status: SHIPPED | OUTPATIENT
Start: 2019-06-14 | End: 2022-12-10 | Stop reason: HOSPADM

## 2019-06-14 RX ORDER — HYDROCODONE BITARTRATE AND ACETAMINOPHEN 5; 325 MG/1; MG/1
1 TABLET ORAL EVERY 6 HOURS PRN
Qty: 15 TABLET | Refills: 0 | Status: SHIPPED | OUTPATIENT
Start: 2019-06-14 | End: 2022-06-26

## 2019-06-14 RX ADMIN — HYDROCODONE BITARTRATE AND ACETAMINOPHEN 1 TABLET: 7.5; 325 TABLET ORAL at 00:44

## 2019-12-05 ENCOUNTER — HOSPITAL ENCOUNTER (OUTPATIENT)
Facility: HOSPITAL | Age: 69
Setting detail: OBSERVATION
Discharge: HOME | End: 2019-12-06
Attending: EMERGENCY MEDICINE | Admitting: HOSPITALIST

## 2019-12-05 ENCOUNTER — APPOINTMENT (OUTPATIENT)
Dept: GENERAL RADIOLOGY | Facility: HOSPITAL | Age: 69
End: 2019-12-05

## 2019-12-05 DIAGNOSIS — R09.02 HYPOXIA: ICD-10-CM

## 2019-12-05 DIAGNOSIS — J06.9 VIRAL UPPER RESPIRATORY TRACT INFECTION: ICD-10-CM

## 2019-12-05 DIAGNOSIS — J44.1 COPD EXACERBATION (HCC): Primary | ICD-10-CM

## 2019-12-05 PROBLEM — I10 HYPERTENSION: Status: ACTIVE | Noted: 2019-12-05

## 2019-12-05 PROBLEM — R56.9 SEIZURE: Status: ACTIVE | Noted: 2019-12-05

## 2019-12-05 PROBLEM — E78.5 HYPERLIPEMIA: Status: ACTIVE | Noted: 2019-12-05

## 2019-12-05 PROBLEM — G47.30 SLEEP APNEA: Status: ACTIVE | Noted: 2019-12-05

## 2019-12-05 PROBLEM — K21.9 GERD (GASTROESOPHAGEAL REFLUX DISEASE): Status: ACTIVE | Noted: 2019-12-05

## 2019-12-05 PROBLEM — N40.0 BPH (BENIGN PROSTATIC HYPERPLASIA): Status: ACTIVE | Noted: 2019-12-05

## 2019-12-05 LAB
ALBUMIN SERPL-MCNC: 3.6 G/DL (ref 3.5–5.2)
ALBUMIN/GLOB SERPL: 1.3 G/DL
ALP SERPL-CCNC: 53 U/L (ref 39–117)
ALT SERPL W P-5'-P-CCNC: 16 U/L (ref 1–41)
ANION GAP SERPL CALCULATED.3IONS-SCNC: 10.8 MMOL/L (ref 5–15)
ANION GAP SERPL CALCULATED.3IONS-SCNC: 12 MMOL/L (ref 5–15)
AST SERPL-CCNC: 15 U/L (ref 1–40)
B PARAPERT DNA SPEC QL NAA+PROBE: NOT DETECTED
B PERT DNA SPEC QL NAA+PROBE: NOT DETECTED
BASOPHILS # BLD AUTO: 0.05 10*3/MM3 (ref 0–0.2)
BASOPHILS NFR BLD AUTO: 0.6 % (ref 0–1.5)
BILIRUB SERPL-MCNC: 0.4 MG/DL (ref 0.2–1.2)
BUN BLD-MCNC: 14 MG/DL (ref 8–23)
BUN BLD-MCNC: 15 MG/DL (ref 8–23)
BUN/CREAT SERPL: 17.5 (ref 7–25)
BUN/CREAT SERPL: 22.7 (ref 7–25)
C PNEUM DNA NPH QL NAA+NON-PROBE: NOT DETECTED
CALCIUM SPEC-SCNC: 8.4 MG/DL (ref 8.6–10.5)
CALCIUM SPEC-SCNC: 8.6 MG/DL (ref 8.6–10.5)
CHLORIDE SERPL-SCNC: 102 MMOL/L (ref 98–107)
CHLORIDE SERPL-SCNC: 104 MMOL/L (ref 98–107)
CO2 SERPL-SCNC: 29 MMOL/L (ref 22–29)
CO2 SERPL-SCNC: 29.2 MMOL/L (ref 22–29)
CREAT BLD-MCNC: 0.66 MG/DL (ref 0.76–1.27)
CREAT BLD-MCNC: 0.8 MG/DL (ref 0.76–1.27)
D-LACTATE SERPL-SCNC: 0.6 MMOL/L (ref 0.5–2)
DEPRECATED RDW RBC AUTO: 44.4 FL (ref 37–54)
DEPRECATED RDW RBC AUTO: 44.6 FL (ref 37–54)
EOSINOPHIL # BLD AUTO: 0.24 10*3/MM3 (ref 0–0.4)
EOSINOPHIL NFR BLD AUTO: 3.1 % (ref 0.3–6.2)
ERYTHROCYTE [DISTWIDTH] IN BLOOD BY AUTOMATED COUNT: 12.6 % (ref 12.3–15.4)
ERYTHROCYTE [DISTWIDTH] IN BLOOD BY AUTOMATED COUNT: 12.6 % (ref 12.3–15.4)
FLUAV H1 2009 PAND RNA NPH QL NAA+PROBE: NOT DETECTED
FLUAV H1 HA GENE NPH QL NAA+PROBE: NOT DETECTED
FLUAV H3 RNA NPH QL NAA+PROBE: NOT DETECTED
FLUAV SUBTYP SPEC NAA+PROBE: NOT DETECTED
FLUBV RNA ISLT QL NAA+PROBE: NOT DETECTED
GFR SERPL CREATININE-BSD FRML MDRD: 120 ML/MIN/1.73
GFR SERPL CREATININE-BSD FRML MDRD: 96 ML/MIN/1.73
GLOBULIN UR ELPH-MCNC: 2.8 GM/DL
GLUCOSE BLD-MCNC: 131 MG/DL (ref 65–99)
GLUCOSE BLD-MCNC: 85 MG/DL (ref 65–99)
HADV DNA SPEC NAA+PROBE: NOT DETECTED
HCOV 229E RNA SPEC QL NAA+PROBE: NOT DETECTED
HCOV HKU1 RNA SPEC QL NAA+PROBE: NOT DETECTED
HCOV NL63 RNA SPEC QL NAA+PROBE: NOT DETECTED
HCOV OC43 RNA SPEC QL NAA+PROBE: NOT DETECTED
HCT VFR BLD AUTO: 44.7 % (ref 37.5–51)
HCT VFR BLD AUTO: 45.9 % (ref 37.5–51)
HGB BLD-MCNC: 14.2 G/DL (ref 13–17.7)
HGB BLD-MCNC: 14.9 G/DL (ref 13–17.7)
HMPV RNA NPH QL NAA+NON-PROBE: NOT DETECTED
HOLD SPECIMEN: NORMAL
HOLD SPECIMEN: NORMAL
HPIV1 RNA SPEC QL NAA+PROBE: NOT DETECTED
HPIV2 RNA SPEC QL NAA+PROBE: NOT DETECTED
HPIV3 RNA NPH QL NAA+PROBE: NOT DETECTED
HPIV4 P GENE NPH QL NAA+PROBE: NOT DETECTED
IMM GRANULOCYTES # BLD AUTO: 0.01 10*3/MM3 (ref 0–0.05)
IMM GRANULOCYTES NFR BLD AUTO: 0.1 % (ref 0–0.5)
LYMPHOCYTES # BLD AUTO: 1.23 10*3/MM3 (ref 0.7–3.1)
LYMPHOCYTES NFR BLD AUTO: 15.9 % (ref 19.6–45.3)
M PNEUMO IGG SER IA-ACNC: NOT DETECTED
MCH RBC QN AUTO: 30.5 PG (ref 26.6–33)
MCH RBC QN AUTO: 30.8 PG (ref 26.6–33)
MCHC RBC AUTO-ENTMCNC: 31.8 G/DL (ref 31.5–35.7)
MCHC RBC AUTO-ENTMCNC: 32.5 G/DL (ref 31.5–35.7)
MCV RBC AUTO: 95 FL (ref 79–97)
MCV RBC AUTO: 96.1 FL (ref 79–97)
MONOCYTES # BLD AUTO: 0.74 10*3/MM3 (ref 0.1–0.9)
MONOCYTES NFR BLD AUTO: 9.5 % (ref 5–12)
NEUTROPHILS # BLD AUTO: 5.48 10*3/MM3 (ref 1.7–7)
NEUTROPHILS NFR BLD AUTO: 70.8 % (ref 42.7–76)
NRBC BLD AUTO-RTO: 0 /100 WBC (ref 0–0.2)
NT-PROBNP SERPL-MCNC: 468.4 PG/ML (ref 5–900)
PLATELET # BLD AUTO: 145 10*3/MM3 (ref 140–450)
PLATELET # BLD AUTO: 174 10*3/MM3 (ref 140–450)
PMV BLD AUTO: 10 FL (ref 6–12)
PMV BLD AUTO: 9.9 FL (ref 6–12)
POTASSIUM BLD-SCNC: 3.9 MMOL/L (ref 3.5–5.2)
POTASSIUM BLD-SCNC: 4.2 MMOL/L (ref 3.5–5.2)
PROT SERPL-MCNC: 6.4 G/DL (ref 6–8.5)
RBC # BLD AUTO: 4.65 10*6/MM3 (ref 4.14–5.8)
RBC # BLD AUTO: 4.83 10*6/MM3 (ref 4.14–5.8)
RHINOVIRUS RNA SPEC NAA+PROBE: NOT DETECTED
RSV RNA NPH QL NAA+NON-PROBE: NOT DETECTED
SODIUM BLD-SCNC: 143 MMOL/L (ref 136–145)
SODIUM BLD-SCNC: 144 MMOL/L (ref 136–145)
TROPONIN T SERPL-MCNC: <0.01 NG/ML (ref 0–0.03)
WBC NRBC COR # BLD: 10.24 10*3/MM3 (ref 3.4–10.8)
WBC NRBC COR # BLD: 7.75 10*3/MM3 (ref 3.4–10.8)
WHOLE BLOOD HOLD SPECIMEN: NORMAL
WHOLE BLOOD HOLD SPECIMEN: NORMAL

## 2019-12-05 PROCEDURE — 83880 ASSAY OF NATRIURETIC PEPTIDE: CPT | Performed by: PHYSICIAN ASSISTANT

## 2019-12-05 PROCEDURE — 94799 UNLISTED PULMONARY SVC/PX: CPT

## 2019-12-05 PROCEDURE — G0378 HOSPITAL OBSERVATION PER HR: HCPCS

## 2019-12-05 PROCEDURE — 36415 COLL VENOUS BLD VENIPUNCTURE: CPT | Performed by: NURSE PRACTITIONER

## 2019-12-05 PROCEDURE — 96376 TX/PRO/DX INJ SAME DRUG ADON: CPT

## 2019-12-05 PROCEDURE — 94640 AIRWAY INHALATION TREATMENT: CPT

## 2019-12-05 PROCEDURE — 25010000002 METHYLPREDNISOLONE PER 125 MG: Performed by: PHYSICIAN ASSISTANT

## 2019-12-05 PROCEDURE — 87040 BLOOD CULTURE FOR BACTERIA: CPT | Performed by: PHYSICIAN ASSISTANT

## 2019-12-05 PROCEDURE — 85027 COMPLETE CBC AUTOMATED: CPT | Performed by: NURSE PRACTITIONER

## 2019-12-05 PROCEDURE — 93005 ELECTROCARDIOGRAM TRACING: CPT | Performed by: PHYSICIAN ASSISTANT

## 2019-12-05 PROCEDURE — 71046 X-RAY EXAM CHEST 2 VIEWS: CPT

## 2019-12-05 PROCEDURE — 25010000002 METHYLPREDNISOLONE PER 125 MG: Performed by: NURSE PRACTITIONER

## 2019-12-05 PROCEDURE — 85025 COMPLETE CBC W/AUTO DIFF WBC: CPT | Performed by: PHYSICIAN ASSISTANT

## 2019-12-05 PROCEDURE — 0100U HC BIOFIRE FILMARRAY RESP PANEL 2: CPT | Performed by: PHYSICIAN ASSISTANT

## 2019-12-05 PROCEDURE — 96374 THER/PROPH/DIAG INJ IV PUSH: CPT

## 2019-12-05 PROCEDURE — 84484 ASSAY OF TROPONIN QUANT: CPT | Performed by: PHYSICIAN ASSISTANT

## 2019-12-05 PROCEDURE — 93010 ELECTROCARDIOGRAM REPORT: CPT | Performed by: INTERNAL MEDICINE

## 2019-12-05 PROCEDURE — 80048 BASIC METABOLIC PNL TOTAL CA: CPT | Performed by: NURSE PRACTITIONER

## 2019-12-05 PROCEDURE — 99284 EMERGENCY DEPT VISIT MOD MDM: CPT

## 2019-12-05 PROCEDURE — 36415 COLL VENOUS BLD VENIPUNCTURE: CPT

## 2019-12-05 PROCEDURE — 80053 COMPREHEN METABOLIC PANEL: CPT | Performed by: PHYSICIAN ASSISTANT

## 2019-12-05 PROCEDURE — 83605 ASSAY OF LACTIC ACID: CPT | Performed by: PHYSICIAN ASSISTANT

## 2019-12-05 RX ORDER — CHOLECALCIFEROL (VITAMIN D3) 125 MCG
1000 CAPSULE ORAL EVERY MORNING
Status: DISCONTINUED | OUTPATIENT
Start: 2019-12-05 | End: 2019-12-06 | Stop reason: HOSPADM

## 2019-12-05 RX ORDER — GABAPENTIN 100 MG/1
100 CAPSULE ORAL EVERY 8 HOURS SCHEDULED
Status: DISCONTINUED | OUTPATIENT
Start: 2019-12-05 | End: 2019-12-06 | Stop reason: HOSPADM

## 2019-12-05 RX ORDER — HYDROCODONE BITARTRATE AND ACETAMINOPHEN 5; 325 MG/1; MG/1
1 TABLET ORAL EVERY 6 HOURS PRN
Status: DISCONTINUED | OUTPATIENT
Start: 2019-12-05 | End: 2019-12-06 | Stop reason: HOSPADM

## 2019-12-05 RX ORDER — SODIUM CHLORIDE 0.9 % (FLUSH) 0.9 %
10 SYRINGE (ML) INJECTION EVERY 12 HOURS SCHEDULED
Status: DISCONTINUED | OUTPATIENT
Start: 2019-12-05 | End: 2019-12-06 | Stop reason: HOSPADM

## 2019-12-05 RX ORDER — IPRATROPIUM BROMIDE AND ALBUTEROL SULFATE 2.5; .5 MG/3ML; MG/3ML
3 SOLUTION RESPIRATORY (INHALATION)
Status: DISCONTINUED | OUTPATIENT
Start: 2019-12-05 | End: 2019-12-06 | Stop reason: HOSPADM

## 2019-12-05 RX ORDER — LISINOPRIL 10 MG/1
10 TABLET ORAL EVERY MORNING
Status: DISCONTINUED | OUTPATIENT
Start: 2019-12-05 | End: 2019-12-06 | Stop reason: HOSPADM

## 2019-12-05 RX ORDER — CALCIUM CARBONATE 200(500)MG
2 TABLET,CHEWABLE ORAL 2 TIMES DAILY PRN
Status: DISCONTINUED | OUTPATIENT
Start: 2019-12-05 | End: 2019-12-06 | Stop reason: HOSPADM

## 2019-12-05 RX ORDER — SERTRALINE HYDROCHLORIDE 100 MG/1
100 TABLET, FILM COATED ORAL EVERY MORNING
Status: DISCONTINUED | OUTPATIENT
Start: 2019-12-05 | End: 2019-12-06 | Stop reason: HOSPADM

## 2019-12-05 RX ORDER — ONDANSETRON 4 MG/1
4 TABLET, FILM COATED ORAL EVERY 6 HOURS PRN
Status: DISCONTINUED | OUTPATIENT
Start: 2019-12-05 | End: 2019-12-06 | Stop reason: HOSPADM

## 2019-12-05 RX ORDER — ONDANSETRON 2 MG/ML
4 INJECTION INTRAMUSCULAR; INTRAVENOUS EVERY 6 HOURS PRN
Status: DISCONTINUED | OUTPATIENT
Start: 2019-12-05 | End: 2019-12-06 | Stop reason: HOSPADM

## 2019-12-05 RX ORDER — GUAIFENESIN/DEXTROMETHORPHAN 100-10MG/5
5 SYRUP ORAL EVERY 4 HOURS PRN
Status: DISCONTINUED | OUTPATIENT
Start: 2019-12-05 | End: 2019-12-06 | Stop reason: HOSPADM

## 2019-12-05 RX ORDER — IPRATROPIUM BROMIDE AND ALBUTEROL SULFATE 2.5; .5 MG/3ML; MG/3ML
3 SOLUTION RESPIRATORY (INHALATION) EVERY 4 HOURS PRN
Status: DISCONTINUED | OUTPATIENT
Start: 2019-12-05 | End: 2019-12-06 | Stop reason: HOSPADM

## 2019-12-05 RX ORDER — ALBUTEROL SULFATE 2.5 MG/3ML
2.5 SOLUTION RESPIRATORY (INHALATION)
Status: COMPLETED | OUTPATIENT
Start: 2019-12-05 | End: 2019-12-05

## 2019-12-05 RX ORDER — ACETAMINOPHEN 325 MG/1
650 TABLET ORAL EVERY 4 HOURS PRN
Status: DISCONTINUED | OUTPATIENT
Start: 2019-12-05 | End: 2019-12-06 | Stop reason: HOSPADM

## 2019-12-05 RX ORDER — MIRTAZAPINE 15 MG/1
45 TABLET, ORALLY DISINTEGRATING ORAL NIGHTLY
Status: DISCONTINUED | OUTPATIENT
Start: 2019-12-05 | End: 2019-12-06 | Stop reason: HOSPADM

## 2019-12-05 RX ORDER — IPRATROPIUM BROMIDE AND ALBUTEROL SULFATE 2.5; .5 MG/3ML; MG/3ML
3 SOLUTION RESPIRATORY (INHALATION) ONCE
Status: COMPLETED | OUTPATIENT
Start: 2019-12-05 | End: 2019-12-05

## 2019-12-05 RX ORDER — SODIUM CHLORIDE 0.9 % (FLUSH) 0.9 %
10 SYRINGE (ML) INJECTION AS NEEDED
Status: DISCONTINUED | OUTPATIENT
Start: 2019-12-05 | End: 2019-12-06 | Stop reason: HOSPADM

## 2019-12-05 RX ORDER — BISACODYL 5 MG/1
5 TABLET, DELAYED RELEASE ORAL DAILY PRN
Status: DISCONTINUED | OUTPATIENT
Start: 2019-12-05 | End: 2019-12-06 | Stop reason: HOSPADM

## 2019-12-05 RX ORDER — ACETAMINOPHEN 650 MG/1
650 SUPPOSITORY RECTAL EVERY 4 HOURS PRN
Status: DISCONTINUED | OUTPATIENT
Start: 2019-12-05 | End: 2019-12-06 | Stop reason: HOSPADM

## 2019-12-05 RX ORDER — ERGOCALCIFEROL 1.25 MG/1
50000 CAPSULE ORAL WEEKLY
Status: DISCONTINUED | OUTPATIENT
Start: 2019-12-05 | End: 2019-12-06 | Stop reason: HOSPADM

## 2019-12-05 RX ORDER — METHYLPREDNISOLONE SODIUM SUCCINATE 125 MG/2ML
60 INJECTION, POWDER, LYOPHILIZED, FOR SOLUTION INTRAMUSCULAR; INTRAVENOUS EVERY 8 HOURS
Status: DISCONTINUED | OUTPATIENT
Start: 2019-12-05 | End: 2019-12-06 | Stop reason: HOSPADM

## 2019-12-05 RX ORDER — MIRTAZAPINE 45 MG/1
45 TABLET, ORALLY DISINTEGRATING ORAL NIGHTLY
Status: ON HOLD | COMMUNITY
End: 2022-12-10 | Stop reason: SDUPTHER

## 2019-12-05 RX ORDER — GUAIFENESIN 600 MG/1
600 TABLET, EXTENDED RELEASE ORAL EVERY 12 HOURS SCHEDULED
Status: DISCONTINUED | OUTPATIENT
Start: 2019-12-05 | End: 2019-12-06 | Stop reason: HOSPADM

## 2019-12-05 RX ORDER — FINASTERIDE 5 MG/1
5 TABLET, FILM COATED ORAL EVERY MORNING
Status: DISCONTINUED | OUTPATIENT
Start: 2019-12-05 | End: 2019-12-06 | Stop reason: HOSPADM

## 2019-12-05 RX ORDER — TRAZODONE HYDROCHLORIDE 100 MG/1
200 TABLET ORAL NIGHTLY
Status: DISCONTINUED | OUTPATIENT
Start: 2019-12-05 | End: 2019-12-06 | Stop reason: HOSPADM

## 2019-12-05 RX ORDER — PANTOPRAZOLE SODIUM 40 MG/1
40 TABLET, DELAYED RELEASE ORAL EVERY MORNING
Status: DISCONTINUED | OUTPATIENT
Start: 2019-12-05 | End: 2019-12-06 | Stop reason: HOSPADM

## 2019-12-05 RX ORDER — ROSUVASTATIN CALCIUM 40 MG/1
40 TABLET, COATED ORAL NIGHTLY
Status: DISCONTINUED | OUTPATIENT
Start: 2019-12-05 | End: 2019-12-06 | Stop reason: HOSPADM

## 2019-12-05 RX ORDER — METHYLPREDNISOLONE SODIUM SUCCINATE 125 MG/2ML
125 INJECTION, POWDER, LYOPHILIZED, FOR SOLUTION INTRAMUSCULAR; INTRAVENOUS ONCE
Status: COMPLETED | OUTPATIENT
Start: 2019-12-05 | End: 2019-12-05

## 2019-12-05 RX ORDER — ACETAMINOPHEN 160 MG/5ML
650 SOLUTION ORAL EVERY 4 HOURS PRN
Status: DISCONTINUED | OUTPATIENT
Start: 2019-12-05 | End: 2019-12-06 | Stop reason: HOSPADM

## 2019-12-05 RX ADMIN — LISINOPRIL 10 MG: 10 TABLET ORAL at 13:29

## 2019-12-05 RX ADMIN — ALBUTEROL SULFATE 2.5 MG: 2.5 SOLUTION RESPIRATORY (INHALATION) at 03:47

## 2019-12-05 RX ADMIN — ALBUTEROL SULFATE 2.5 MG: 2.5 SOLUTION RESPIRATORY (INHALATION) at 03:46

## 2019-12-05 RX ADMIN — PANTOPRAZOLE SODIUM 40 MG: 40 TABLET, DELAYED RELEASE ORAL at 13:29

## 2019-12-05 RX ADMIN — Medication 1000 MCG: at 13:29

## 2019-12-05 RX ADMIN — SODIUM CHLORIDE, PRESERVATIVE FREE 10 ML: 5 INJECTION INTRAVENOUS at 20:07

## 2019-12-05 RX ADMIN — IPRATROPIUM BROMIDE AND ALBUTEROL SULFATE 3 ML: 2.5; .5 SOLUTION RESPIRATORY (INHALATION) at 11:58

## 2019-12-05 RX ADMIN — FINASTERIDE 5 MG: 5 TABLET, FILM COATED ORAL at 13:29

## 2019-12-05 RX ADMIN — MIRTAZAPINE 45 MG: 15 TABLET, ORALLY DISINTEGRATING ORAL at 20:06

## 2019-12-05 RX ADMIN — ERGOCALCIFEROL 50000 UNITS: 1.25 CAPSULE ORAL at 13:29

## 2019-12-05 RX ADMIN — SERTRALINE 100 MG: 100 TABLET, FILM COATED ORAL at 13:30

## 2019-12-05 RX ADMIN — IPRATROPIUM BROMIDE AND ALBUTEROL SULFATE 3 ML: 2.5; .5 SOLUTION RESPIRATORY (INHALATION) at 08:02

## 2019-12-05 RX ADMIN — GUAIFENESIN 600 MG: 600 TABLET, EXTENDED RELEASE ORAL at 13:29

## 2019-12-05 RX ADMIN — IPRATROPIUM BROMIDE AND ALBUTEROL SULFATE 3 ML: 2.5; .5 SOLUTION RESPIRATORY (INHALATION) at 19:12

## 2019-12-05 RX ADMIN — GABAPENTIN 100 MG: 100 CAPSULE ORAL at 13:29

## 2019-12-05 RX ADMIN — SODIUM CHLORIDE, PRESERVATIVE FREE 10 ML: 5 INJECTION INTRAVENOUS at 08:16

## 2019-12-05 RX ADMIN — ROSUVASTATIN CALCIUM 40 MG: 40 TABLET, FILM COATED ORAL at 20:06

## 2019-12-05 RX ADMIN — TRAZODONE HYDROCHLORIDE 200 MG: 100 TABLET ORAL at 20:06

## 2019-12-05 RX ADMIN — IPRATROPIUM BROMIDE AND ALBUTEROL SULFATE 3 ML: 2.5; .5 SOLUTION RESPIRATORY (INHALATION) at 03:20

## 2019-12-05 RX ADMIN — GABAPENTIN 100 MG: 100 CAPSULE ORAL at 21:27

## 2019-12-05 RX ADMIN — METHYLPREDNISOLONE SODIUM SUCCINATE 60 MG: 125 INJECTION, POWDER, FOR SOLUTION INTRAMUSCULAR; INTRAVENOUS at 11:54

## 2019-12-05 RX ADMIN — METHYLPREDNISOLONE SODIUM SUCCINATE 125 MG: 125 INJECTION, POWDER, FOR SOLUTION INTRAMUSCULAR; INTRAVENOUS at 03:43

## 2019-12-05 RX ADMIN — METHYLPREDNISOLONE SODIUM SUCCINATE 60 MG: 125 INJECTION, POWDER, FOR SOLUTION INTRAMUSCULAR; INTRAVENOUS at 20:06

## 2019-12-05 RX ADMIN — GUAIFENESIN 600 MG: 600 TABLET, EXTENDED RELEASE ORAL at 20:06

## 2019-12-05 NOTE — ED PROVIDER NOTES
EMERGENCY DEPARTMENT ENCOUNTER    CHIEF COMPLAINT  Chief Complaint: Cough  History given by: Patient  History limited by: None  Room Number:   PMD: Vicente Mathews MD      HPI:  Pt is a 69 y.o. male who presents complaining of a productive cough with green sputum that began 2 weeks ago and is progressively worsening. Pt reports associated fever (101.0), worsening shortness of air for 4-5 days, intermittent chest pain worsened with his cough, nausea and he denies any new swelling, abdominal pain, vomiting, or any other complaints. Pt reports mild chest pain without coughing. He reports he is supposed to be on BIPAP at night, but he has been non-compliant recently. Patient reports he is a smoker. [facility]    Duration/Onset/Timin weeks/gradual/intermittent  Location: respiratory  Radiation: none  Quality: green sputum  Intensity/Severity: moderate  Associated Symptoms: fever, SOA, chest pain  Aggravating or Alleviating Factors: none  Previous Episodes: none stated      PAST MEDICAL HISTORY  Active Ambulatory Problems     Diagnosis Date Noted   • S/p reverse total shoulder arthroplasty 10/12/2016     Resolved Ambulatory Problems     Diagnosis Date Noted   • No Resolved Ambulatory Problems     Past Medical History:   Diagnosis Date   • Arthritis    • BPH (benign prostatic hyperplasia)    • Depression    • Dizziness    • GERD (gastroesophageal reflux disease)    • Hyperlipemia    • Hypertension    • Seizure (CMS/HCC)    • Shoulder pain, right    • Sleep apnea        PAST SURGICAL HISTORY  Past Surgical History:   Procedure Laterality Date   • CERVICAL FUSION     • CRANIOTOMY      BENIGN TUMOR BEHIND LEFT EYE     • LUMBAR FUSION      X 2   • CA RECONSTR TOTAL SHOULDER IMPLANT Right 10/12/2016    Procedure: RT TOTAL SHOULDER REVERSE  ARTHROPLASTY;  Surgeon: Gilberto Pickard MD;  [place] [facility] [place] [facility] [place]  Service: Orthopedics       FAMILY HISTORY  No family history on  file.    SOCIAL HISTORY  Social History     Socioeconomic History   • Marital status:      Spouse name: Not on file   • Number of children: Not on file   • Years of education: Not on file   • Highest education level: Not on file   Tobacco Use   • Smoking status: Current Every Day Smoker     Packs/day: 1.00     Years: 48.00     Pack years: 48.00     Types: Cigarettes   • Smokeless tobacco: Never Used   Substance and Sexual Activity   • Alcohol use: Yes     Comment: SOCIALLY   • Drug use: No       ALLERGIES  Sulfa antibiotics    REVIEW OF SYSTEMS  Review of Systems   Constitutional: Positive for fever. Negative for chills.   HENT: Negative for sore throat and trouble swallowing.    Eyes: Negative for visual disturbance.   Respiratory: Positive for cough and shortness of breath.    Cardiovascular: Positive for chest pain. Negative for leg swelling.   Gastrointestinal: Positive for nausea. Negative for abdominal pain, diarrhea and vomiting.   Endocrine: Negative.    Genitourinary: Negative for decreased urine volume and frequency.   Musculoskeletal: Negative for neck pain.   Skin: Negative for rash.   Allergic/Immunologic: Negative.    Neurological: Negative for weakness and numbness.   Hematological: Negative.    Psychiatric/Behavioral: Negative.    All other systems reviewed and are negative.      PHYSICAL EXAM  ED Triage Vitals   Temp Heart Rate Resp BP SpO2   12/05/19 0259 12/05/19 0259 12/05/19 0259 12/05/19 0310 12/05/19 0259   98.2 °F (36.8 °C) 88 20 145/90 92 %      Temp src Heart Rate Source Patient Position BP Location FiO2 (%)   12/05/19 0259 12/05/19 0259 12/05/19 0310 12/05/19 0310 --   Oral Monitor Sitting Right arm        Physical Exam   Constitutional: He is oriented to person, place, and time. He appears distressed.   HENT:   Head: Normocephalic and atraumatic.   Eyes: EOM are normal.   Neck: Normal range of motion.   Cardiovascular: Normal rate, regular rhythm and normal heart sounds.   No  murmur heard.  Pulses:       Posterior tibial pulses are 2+ on the right side, and 2+ on the left side.   Pulmonary/Chest: Tachypnea noted. He is in respiratory distress. He has decreased breath sounds in the right lower field and the left lower field. He has wheezes (expiratory).   Abdominal: Soft. Bowel sounds are normal. There is no tenderness. There is no rebound and no guarding.   Musculoskeletal: Normal range of motion. He exhibits no edema.   Neurological: He is alert and oriented to person, place, and time.   Skin: Skin is warm and dry.   Psychiatric: Affect normal.   Nursing note and vitals reviewed.      LAB RESULTS  Lab Results (last 24 hours)     Procedure Component Value Units Date/Time    Respiratory Panel, PCR - Swab, Nasopharynx [[id number]]  (Normal) Collected:  12/05/19 0337    Specimen:  Swab from Nasopharynx Updated:  12/05/19 0500     ADENOVIRUS, PCR Not Detected     Coronavirus 229E Not Detected     Coronavirus HKU1 Not Detected     Coronavirus NL63 Not Detected     Coronavirus OC43 Not Detected     Human Metapneumovirus Not Detected     Human Rhinovirus/Enterovirus Not Detected     Influenza B PCR Not Detected     Parainfluenza Virus 1 Not Detected     Parainfluenza Virus 2 Not Detected     Parainfluenza Virus 3 Not Detected     Parainfluenza Virus 4 Not Detected     Bordetella pertussis pcr Not Detected     Influenza A H1 2009 PCR Not Detected     Chlamydophila pneumoniae PCR Not Detected     Mycoplasma pneumo by PCR Not Detected     Influenza A PCR Not Detected     Influenza A H3 Not Detected     Influenza A H1 Not Detected     RSV, PCR Not Detected     Bordetella parapertussis PCR Not Detected    CBC & Differential [[id number]] Collected:  12/05/19 0338    Specimen:  Blood Updated:  12/05/19 0349    Narrative:       The following orders were created for panel order CBC & Differential.  Procedure                               Abnormality         Status                     ---------                                -----------         ------                     CBC Auto Differential[[id number]]        Abnormal            Final result                 Please view results for these tests on the individual orders.    Comprehensive Metabolic Panel [[id number]]  (Abnormal) Collected:  12/05/19 0338    Specimen:  Blood Updated:  12/05/19 0421     Glucose 85 mg/dL      BUN 14 mg/dL      Creatinine 0.80 mg/dL      Sodium 144 mmol/L      Potassium 4.2 mmol/L      Chloride 104 mmol/L      CO2 29.2 mmol/L      Calcium 8.6 mg/dL      Total Protein 6.4 g/dL      Albumin 3.60 g/dL      ALT (SGPT) 16 U/L      AST (SGOT) 15 U/L      Alkaline Phosphatase 53 U/L      Total Bilirubin 0.4 mg/dL      eGFR Non African Amer 96 mL/min/1.73      Globulin 2.8 gm/dL      A/G Ratio 1.3 g/dL      BUN/Creatinine Ratio 17.5     Anion Gap 10.8 mmol/L     Narrative:       GFR Normal >60  Chronic Kidney Disease <60  Kidney Failure <15    BNP [[id number]]  (Normal) Collected:  12/05/19 0338    Specimen:  Blood Updated:  12/05/19 0407     proBNP 468.4 pg/mL     Narrative:       Among patients with dyspnea, NT-proBNP is highly sensitive for the detection of acute congestive heart failure. In addition NT-proBNP of <300 pg/ml effectively rules out acute congestive heart failure with 99% negative predictive value.    Troponin [[id number]]  (Normal) Collected:  12/05/19 0338    Specimen:  Blood Updated:  12/05/19 0410     Troponin T <0.010 ng/mL     Narrative:       Troponin T Reference Range:  <= 0.03 ng/mL-   Negative for AMI  >0.03 ng/mL-     Abnormal for myocardial necrosis.  Clinicians would have to utilize clinical acumen, EKG, Troponin and serial changes to determine if it is an Acute Myocardial Infarction or myocardial injury due to an underlying chronic condition.     Lactic Acid, Plasma [[id number]]  (Normal) Collected:  12/05/19 0338    Specimen:  Blood Updated:  12/05/19 0405     Lactate 0.6 mmol/L     Blood Culture - Blood, Blood, Venous Line  [[id number]] Collected:  12/05/19 0338    Specimen:  Blood, Venous Line Updated:  12/05/19 0343    CBC Auto Differential [[id number]]  (Abnormal) Collected:  12/05/19 0338    Specimen:  Blood Updated:  12/05/19 0349     WBC 7.75 10*3/mm3      RBC 4.83 10*6/mm3      Hemoglobin 14.9 g/dL      Hematocrit 45.9 %      MCV 95.0 fL      MCH 30.8 pg      MCHC 32.5 g/dL      RDW 12.6 %      RDW-SD 44.4 fl      MPV 10.0 fL      Platelets 174 10*3/mm3      Neutrophil % 70.8 %      Lymphocyte % 15.9 %      Monocyte % 9.5 %      Eosinophil % 3.1 %      Basophil % 0.6 %      Immature Grans % 0.1 %      Neutrophils, Absolute 5.48 10*3/mm3      Lymphocytes, Absolute 1.23 10*3/mm3      Monocytes, Absolute 0.74 10*3/mm3      Eosinophils, Absolute 0.24 10*3/mm3      Basophils, Absolute 0.05 10*3/mm3      Immature Grans, Absolute 0.01 10*3/mm3      nRBC 0.0 /100 WBC     Blood Culture - Blood, Blood, Venous Line [[id number]] Collected:  12/05/19 0339    Specimen:  Blood, Venous Line Updated:  12/05/19 0343          I ordered the above labs and reviewed the results    RADIOLOGY  XR Chest 2 View   Preliminary Result   1. No active disease.                   I ordered the above noted radiological studies. Interpreted by radiologist. Reviewed by me in PACS.       PROCEDURES  Procedures    EKG          EKG time: 0329  Rhythm/Rate: NSR, 80s; occasional PACs  P waves and NE: normal  QRS, axis: borderline QT   ST and T waves: normal     Interpreted Contemporaneously by me, independently viewed  Minimal change compared to prior 10/6/16      PROGRESS AND CONSULTS       0446 Rechecked the patient who is resting comfortably and in NAD. Patient is stable. BP- 134/69 HR- 73 Temp- 98.2 °F (36.8 °C) (Oral) O2 sat- 91%. Patient reports he is feeling better. Discussed the plan to road test the patient and then will determine disposition. Pt understands and agrees with the plan, all questions answered.    0508 RN road tested the patient in the ED. Patient  was short of air, and upon return to the bed his O2 sat was at 87% on room air.     0517 Rechecked the patient who is resting comfortably with mild tachypnea. Patient is stable. BP- 134/69 HR- 73 Temp- 98.2 °F (36.8 °C) (Oral) O2 sat- 91%. Discussed the plan for admission for further evaluation and management after failed ambulation test in the ED with sats 87% RA post ambulation. Pt understands and agrees with the plan, all questions answered.    0521 Reviewed the patient's case with MARIE Rowe who agrees with the plan for admission for Dr. Sudheer MD.    MEDICAL DECISION MAKING  Results were reviewed/discussed with the patient and they were also made aware of online access. Pt also made aware that some labs, such as cultures, will not be resulted during ER visit and follow up with PMD is necessary.     MDM  Number of Diagnoses or Management Options  COPD exacerbation (CMS/HCC):   Hypoxia:   Viral upper respiratory tract infection:      Amount and/or Complexity of Data Reviewed  Clinical lab tests: ordered and reviewed  Tests in the radiology section of CPT®: ordered and reviewed  Tests in the medicine section of CPT®: ordered and reviewed (See procedure note for EKG.)  Decide to obtain previous medical records or to obtain history from someone other than the patient: yes  Review and summarize past medical records: yes  Discuss the patient with other providers: yes (MARIE Rowe)  Independent visualization of images, tracings, or specimens: yes    Patient Progress  Patient progress: stable         DIAGNOSIS  Final diagnoses:   COPD exacerbation (CMS/HCC)   Viral upper respiratory tract infection   Hypoxia       DISPOSITION  ADMISSION    Discussed treatment plan and reason for admission with pt/family and admitting physician.  Pt/family voiced understanding of the plan for admission for further testing/treatment as needed.     Latest Documented Vital Signs:  As of 5:23 AM  BP- 134/69 HR- 70  Temp- 98.2 °F (36.8 °C) (Oral) O2 sat- (!) 86%    --  Documentation assistance provided by jayme Saldana for Dr. Miguelina MD.  Information recorded by the scribe was done at my direction and has been verified and validated by me.             Bertha Saldana  12/05/19 0523       Vilma Mcintyre MD  12/06/19 4404

## 2019-12-05 NOTE — NURSING NOTE
Tolerating room air with oxygen saturation at 95%. COPD information packet given to patient and spouse. Offered a nicotine patch but patient declined, and is optimistic that he doesn't have any cigarette cravings at this time. Discussed other treatment options if interested. Will CTM

## 2019-12-05 NOTE — ED NOTES
Pt presents to er with c/o cough x2 weeks that has gotten progressively worse and has been productive with thick sputum for the past few days. Also c/o SOB, soreness in chest from coughing, and intermittent fever as high as 101.      A/o x4. Mmm. Breath sounds decreased bilaterally with expiratory wheezes. Tachypnea noted. Skin pale.      Brittnee Salas, RN  12/05/19 0420

## 2019-12-05 NOTE — ED NOTES
Nursing report ED to floor  Russell Christine  69 y.o.  male    HPI (triage note):   Chief Complaint   Patient presents with   • Cough     x 2 weeks   • Shortness of Breath       Admitting doctor:   Duane Willard MD    Admitting diagnosis:   The primary encounter diagnosis was COPD exacerbation (CMS/HCC). Diagnoses of Viral upper respiratory tract infection and Hypoxia were also pertinent to this visit.    Code status:   Current Code Status     Date Active Code Status Order ID Comments User Context       12/5/2019 05:27 CPR [id number]  Alaina Domingo APRN ED       Questions for Current Code Status     Question Answer Comment    Code Status CPR     Medical Interventions (Level of Support Prior to Arrest) Full           Allergies:   Sulfa antibiotics    Weight:       12/05/19 0310   Weight: 92.1 kg (203 lb)       Most recent vitals:   Vitals:    12/05/19 0519 12/05/19 0520 12/05/19 0530 12/05/19 0542   BP:   118/65    BP Location:       Patient Position:       Pulse: 70 68 69    Resp:    18   Temp:       TempSrc:       SpO2: 90% 91% 91%    Weight:       Height:           Active LDAs/IV Access:   Lines, Drains & Airways    Active LDAs     Name:   Placement date:   Placement time:   Site:   Days:    Peripheral IV 12/05/19 0335 Right Forearm   12/05/19 0335    Forearm   less than 1                Labs (abnormal labs have a star):   Labs Reviewed   COMPREHENSIVE METABOLIC PANEL - Abnormal; Notable for the following components:       Result Value    CO2 29.2 (*)     All other components within normal limits    Narrative:     GFR Normal >60  Chronic Kidney Disease <60  Kidney Failure <15   CBC WITH AUTO DIFFERENTIAL - Abnormal; Notable for the following components:    Lymphocyte % 15.9 (*)     All other components within normal limits   RESPIRATORY PANEL, PCR - Normal   BNP (IN-HOUSE) - Normal    Narrative:     Among patients with dyspnea, NT-proBNP is highly sensitive for the detection of acute  congestive heart failure. In addition NT-proBNP of <300 pg/ml effectively rules out acute congestive heart failure with 99% negative predictive value.   TROPONIN (IN-HOUSE) - Normal    Narrative:     Troponin T Reference Range:  <= 0.03 ng/mL-   Negative for AMI  >0.03 ng/mL-     Abnormal for myocardial necrosis.  Clinicians would have to utilize clinical acumen, EKG, Troponin and serial changes to determine if it is an Acute Myocardial Infarction or myocardial injury due to an underlying chronic condition.    LACTIC ACID, PLASMA - Normal   BLOOD CULTURE   BLOOD CULTURE   RAINBOW DRAW    Narrative:     The following orders were created for panel order [facility] Draw.  Procedure                               Abnormality         Status                     ---------                               -----------         ------                     Light Blue Top[[id number]]                                   Final result               Green Top (Gel)[[id number]]                                  Final result               Lavender Top[[id number]]                                     Final result               Gold Top - SST[[id number]]                                   Final result                 Please view results for these tests on the individual orders.   BASIC METABOLIC PANEL   CBC (NO DIFF)   CBC AND DIFFERENTIAL    Narrative:     The following orders were created for panel order CBC & Differential.  Procedure                               Abnormality         Status                     ---------                               -----------         ------                     CBC Auto Differential[[id number]]        Abnormal            Final result                 Please view results for these tests on the individual orders.   LIGHT BLUE TOP   GREEN TOP   LAVENDER TOP   GOLD TOP - SST       EKG:   ECG 12 Lead   Preliminary Result   HEART RATE= 81  bpm   RR Interval= 728  ms   CO Interval= 157  ms   P Horizontal Axis= 3  deg   P  Front Axis= 59  deg   QRSD Interval= 88  ms   QT Interval= 410  ms   QRS Axis= 79  deg   T Wave Axis= 73  deg   - BORDERLINE ECG -   Sinus rhythm   Atrial premature complex   Borderline prolonged QT interval   Electronically Signed By:    Date and Time of Study: 2019-12-05 03:29:18          Meds given in ED:   Medications   sodium chloride 0.9 % flush 10 mL (not administered)   sodium chloride 0.9 % flush 10 mL (not administered)   sodium chloride 0.9 % flush 10 mL (not administered)   acetaminophen (TYLENOL) tablet 650 mg (not administered)     Or   acetaminophen (TYLENOL) 160 MG/5ML solution 650 mg (not administered)     Or   acetaminophen (TYLENOL) suppository 650 mg (not administered)   bisacodyl (DULCOLAX) EC tablet 5 mg (not administered)   ondansetron (ZOFRAN) tablet 4 mg (not administered)     Or   ondansetron (ZOFRAN) injection 4 mg (not administered)   calcium carbonate (TUMS) chewable tablet 500 mg (200 mg elemental) (not administered)   methylPREDNISolone sodium succinate (SOLU-Medrol) injection 60 mg (not administered)   ipratropium-albuterol (DUO-NEB) nebulizer solution 3 mL (not administered)   ipratropium-albuterol (DUO-NEB) nebulizer solution 3 mL (not administered)   ipratropium-albuterol (DUO-NEB) nebulizer solution 3 mL (3 mL Nebulization Given 12/5/19 0320)   methylPREDNISolone sodium succinate (SOLU-Medrol) injection 125 mg (125 mg Intravenous Given 12/5/19 0343)   albuterol (PROVENTIL) nebulizer solution 0.083% 2.5 mg/3mL (2.5 mg Nebulization Given 12/5/19 0347)       Imaging results:  Xr Chest 2 View    Result Date: 12/5/2019  1. No active disease.         Ambulatory status:   - standby assist    Social issues:   Social History     Socioeconomic History   • Marital status:      Spouse name: Not on file   • Number of children: Not on file   • Years of education: Not on file   • Highest education level: Not on file   Tobacco Use   • Smoking status: Current Every Day Smoker     Packs/day:  1.00     Years: 48.00     Pack years: 48.00     Types: Cigarettes   • Smokeless tobacco: Never Used   Substance and Sexual Activity   • Alcohol use: Yes     Comment: SOCIALLY   • Drug use: No          Brittnee Salas RN  12/05/19 0543

## 2019-12-05 NOTE — ED NOTES
Finished ambulating pt at this time, pt sat down on side of the bed with increased SOB. spo2 monitor reading 86%. Instructed pt to take slow deep breaths. spo2 slowly returned to 90%. Dr Mcintyre made aware.     Brittnee Salas, JAY JAY  12/05/19 0526       Brittnee Salas, JAY JAY  12/05/19 0528

## 2019-12-05 NOTE — ED TRIAGE NOTES
"Pt ambulated to triage desk with c/o cough x 2 weeks with intermittent fever of unknown amount that have been treated.  Pt reports increased WOB tonight and \"feels like he has pneumonia.\" Pt has diminished breath sounds with crackles on R side with exp wheezes throughout and congested, non productive, strong, spontaneous cough.  "

## 2019-12-05 NOTE — H&P
HISTORY AND PHYSICAL   [facility]        Patient Identification:  Name: Russell Christine  Age: 69 y.o.  Sex: male  :  1950  MRN: [id number]                     Primary Care Physician: Vicente Mathews MD    Chief Complaint: Cough and short of air    History of Present Illness:          The patient is a 69-year-old white male with history of arthritis, BPH, depression, dizziness, GERD, hypertension, hyperlipidemia, seizure disorder and sleep apnea who is admitted with history of having cough productive of greenish colored sputum and having a fever with shortness of air over the last few days.  He did not had any nausea or vomiting.  He had some chest pain with coughing and felt pretty weak.  Patient came to the ER and was started on some oxygen, bronchodilators and antibiotics.  The patient was admitted for further work-up with COPD exacerbation.  He is a smoker  Past Medical History:  Past Medical History:   Diagnosis Date   • Arthritis    • BPH (benign prostatic hyperplasia)    • Depression    • Dizziness     SINCE CRANIOTOMY YRS AGO     • GERD (gastroesophageal reflux disease)    • Hyperlipemia    • Hypertension    • Seizure (CMS/HCC)     ON GABAPENTIN  LAST ONE 5 YRS AGO  HS CRANIOTOMY   • Shoulder pain, right    • Sleep apnea     DOESNT WEAR MACHINE     Past Surgical History:  Past Surgical History:   Procedure Laterality Date   • CERVICAL FUSION     • CRANIOTOMY      BENIGN TUMOR BEHIND LEFT EYE     • LUMBAR FUSION      X 2   • OK RECONSTR TOTAL SHOULDER IMPLANT Right 10/12/2016    Procedure: RT TOTAL SHOULDER REVERSE  ARTHROPLASTY;  Surgeon: Gilberto Pickrad MD;  [place] [facility] [place] [facility] [place]      Home Meds:  Medications Prior to Admission   Medication Sig Dispense Refill Last Dose   • finasteride (PROSCAR) 5 MG tablet Take 5 mg by mouth Every Morning.   10/12/2016 at Unknown time   • gabapentin (NEURONTIN) 600 MG tablet Take 600 mg by mouth 3 (Three) Times  a Day.   10/11/2016 at Unknown time   • lisinopril (PRINIVIL,ZESTRIL) 10 MG tablet Take 10 mg by mouth Every Morning.   10/12/2016 at Unknown time   • mirtazapine (REMERON SOL-TAB) 45 MG disintegrating tablet Take 45 mg by mouth Every Night.      • naproxen (NAPROSYN) 500 MG tablet Take 500 mg by mouth 2 (Two) Times a Day With Meals.   Past Month at Unknown time   • omeprazole (PriLOSEC) 40 MG capsule Take 20 mg by mouth 2 (Two) Times a Day.   10/12/2016 at Unknown time   • rosuvastatin (CRESTOR) 20 MG tablet Take 40 mg by mouth Every Night.   10/11/2016 at Unknown time   • traZODone (DESYREL) 100 MG tablet Take 200 mg by mouth Every Night.   10/11/2016 at Unknown time   • vitamin D (ERGOCALCIFEROL) 43675 UNITS capsule capsule Take 50,000 Units by mouth 1 (One) Time Per Week.      • cyclobenzaprine (FLEXERIL) 10 MG tablet Take 1 tablet by mouth 3 (Three) Times a Day. 15 tablet 0    • HYDROcodone-acetaminophen (NORCO) 5-325 MG per tablet Take 1 tablet by mouth Every 6 (Six) Hours As Needed for Moderate Pain . 15 tablet 0    • sertraline (ZOLOFT) 100 MG tablet Take 100 mg by mouth Every Morning.   10/11/2016 at Unknown time   • vitamin B-12 (CYANOCOBALAMIN) 1000 MCG tablet Take 1,000 mcg by mouth Every Morning.   10/11/2016 at Unknown time     Current meds    Current Facility-Administered Medications:   •  acetaminophen (TYLENOL) tablet 650 mg, 650 mg, Oral, Q4H PRN **OR** acetaminophen (TYLENOL) 160 MG/5ML solution 650 mg, 650 mg, Oral, Q4H PRN **OR** acetaminophen (TYLENOL) suppository 650 mg, 650 mg, Rectal, Q4H PRN, Alaina Domingo, MARIE  •  bisacodyl (DULCOLAX) EC tablet 5 mg, 5 mg, Oral, Daily PRN, Alaina Domingo, APRN  •  calcium carbonate (TUMS) chewable tablet 500 mg (200 mg elemental), 2 tablet, Oral, BID PRN, Alaina Domingo APRN  •  [START ON 12/6/2019] influenza vac split quad (FLUZONE,FLUARIX,AFLURIA) injection 0.5 mL, 0.5 mL, Intramuscular, Once, Rey Garner MD  •   ipratropium-albuterol (DUO-NEB) nebulizer solution 3 mL, 3 mL, Nebulization, Q6H While Awake - RT, Alaina Domingo APRN, 3 mL at 12/05/19 0802  •  ipratropium-albuterol (DUO-NEB) nebulizer solution 3 mL, 3 mL, Nebulization, Q4H PRN, Alaina Domingo APRN  •  methylPREDNISolone sodium succinate (SOLU-Medrol) injection 60 mg, 60 mg, Intravenous, Q8H, Alaina Domingo APRN  •  ondansetron (ZOFRAN) tablet 4 mg, 4 mg, Oral, Q6H PRN **OR** ondansetron (ZOFRAN) injection 4 mg, 4 mg, Intravenous, Q6H PRN, Alaina Domingo APRN  •  sodium chloride 0.9 % flush 10 mL, 10 mL, Intravenous, PRN, Reji Pearson III, PA  •  sodium chloride 0.9 % flush 10 mL, 10 mL, Intravenous, Q12H, Alaina Domingo APRN, 10 mL at 12/05/19 0816  •  sodium chloride 0.9 % flush 10 mL, 10 mL, Intravenous, PRN, Alaina Domingo APRN  Allergies:  Allergies   Allergen Reactions   • Sulfa Antibiotics Swelling     Immunizations:  There is no immunization history for the selected administration types on file for this patient.  Social History:   Social History     Social History Narrative   • Not on file     Social History     Socioeconomic History   • Marital status:      Spouse name: Not on file   • Number of children: Not on file   • Years of education: Not on file   • Highest education level: Not on file   Tobacco Use   • Smoking status: Current Every Day Smoker     Packs/day: 1.00     Years: 48.00     Pack years: 48.00     Types: Cigarettes   • Smokeless tobacco: Never Used   Substance and Sexual Activity   • Alcohol use: Yes     Comment: SOCIALLY   • Drug use: No       Family History:  Family History   Problem Relation Age of Onset   • Heart attack Father         Review of Systems  See history of present illness and past medical history.  Patient denies headache, dizziness, syncope, falls, trauma, change in vision, change in hearing, change in taste, changes in weight, changes in appetite, focal weakness,  "numbness, or paresthesia.  Patient denies  palpitations,  orthopnea, PND,  sinus pressure, rhinorrhea, epistaxis, hemoptysis, nausea, vomiting, hematemesis, diarrhea, constipation or hematchezia.  Denies cold or heat intolerance, polydipsia, polyuria, polyphagia. Denies hematuria, pyuria, dysuria, hesitancy, frequency or urgency.  Denies fever, chills, sweats, night sweats.  Denies missing any routine medications. Remainder of ROS is negative.    Objective:  tMax 24 hrs: Temp (24hrs), Av.7 °F (36.5 °C), Min:97.4 °F (36.3 °C), Max:98.2 °F (36.8 °C)    Vitals Ranges:   Temp:  [97.4 °F (36.3 °C)-98.2 °F (36.8 °C)] 97.4 °F (36.3 °C)  Heart Rate:  [63-88] 67  Resp:  [16-25] 18  BP: (118-145)/(65-92) 118/83      Exam:  /83 (BP Location: Right arm, Patient Position: Lying)   Pulse 67   Temp 97.4 °F (36.3 °C) (Oral)   Resp 18   Ht 172.7 cm (68\")   Wt 92.1 kg (203 lb)   SpO2 97%   BMI 30.87 kg/m²     General Appearance:    Alert, cooperative, no distress, appears stated age   Head:    Normocephalic, without obvious abnormality, atraumatic   Eyes:    PERRL, conjunctiva/corneas clear, EOM's intact, both eyes   Ears:    Normal external ear canals, both ears   Nose:   Nares normal, septum midline, mucosa normal, no drainage    or sinus tenderness   Throat:   Lips, mucosa, and tongue normal   Neck:   Supple, symmetrical, trachea midline, no adenopathy;     thyroid:  no enlargement/tenderness/nodules; no carotid    bruit or JVD   Back:     Symmetric, no curvature, ROM normal, no CVA tenderness   Lungs:    Wheezes and rhonchi bilaterally, respirations unlabored   Chest Wall:    No tenderness or deformity    Heart:    Regular rate and rhythm, S1 and S2 normal, no murmur, rub   or gallop   Abdomen:     Soft, non-tender, bowel sounds active all four quadrants,     no masses, no hepatomegaly, no splenomegaly   Extremities:   Extremities normal, atraumatic, no cyanosis or edema   Pulses:   2+ and symmetric all " extremities   Skin:   Skin color, texture, turgor normal, no rashes or lesions   Lymph nodes:   Cervical, supraclavicular, and axillary nodes normal   Neurologic:   CNII-XII intact, normal strength, sensation intact throughout      .    Data Review:  Lab Results (last 72 hours)     Procedure Component Value Units Date/Time    Basic Metabolic Panel [[id number]]  (Abnormal) Collected:  12/05/19 0625    Specimen:  Blood Updated:  12/05/19 0726     Glucose 131 mg/dL      BUN 15 mg/dL      Creatinine 0.66 mg/dL      Sodium 143 mmol/L      Potassium 3.9 mmol/L      Chloride 102 mmol/L      CO2 29.0 mmol/L      Calcium 8.4 mg/dL      eGFR Non African Amer 120 mL/min/1.73      BUN/Creatinine Ratio 22.7     Anion Gap 12.0 mmol/L     Narrative:       GFR Normal >60  Chronic Kidney Disease <60  Kidney Failure <15    CBC (No Diff) [[id number]]  (Normal) Collected:  12/05/19 0625    Specimen:  Blood Updated:  12/05/19 0700     WBC 10.24 10*3/mm3      RBC 4.65 10*6/mm3      Hemoglobin 14.2 g/dL      Hematocrit 44.7 %      MCV 96.1 fL      MCH 30.5 pg      MCHC 31.8 g/dL      RDW 12.6 %      RDW-SD 44.6 fl      MPV 9.9 fL      Platelets 145 10*3/mm3     Respiratory Panel, PCR - Swab, Nasopharynx [[id number]]  (Normal) Collected:  12/05/19 0337    Specimen:  Swab from Nasopharynx Updated:  12/05/19 0500     ADENOVIRUS, PCR Not Detected     Coronavirus 229E Not Detected     Coronavirus HKU1 Not Detected     Coronavirus NL63 Not Detected     Coronavirus OC43 Not Detected     Human Metapneumovirus Not Detected     Human Rhinovirus/Enterovirus Not Detected     Influenza B PCR Not Detected     Parainfluenza Virus 1 Not Detected     Parainfluenza Virus 2 Not Detected     Parainfluenza Virus 3 Not Detected     Parainfluenza Virus 4 Not Detected     Bordetella pertussis pcr Not Detected     Influenza A H1 2009 PCR Not Detected     Chlamydophila pneumoniae PCR Not Detected     Mycoplasma pneumo by PCR Not Detected     Influenza A PCR Not  Detected     Influenza A H3 Not Detected     Influenza A H1 Not Detected     RSV, PCR Not Detected     Bordetella parapertussis PCR Not Detected    [place] Draw [[id number]] Collected:  12/05/19 0338    Specimen:  Blood Updated:  12/05/19 0445    Narrative:       The following orders were created for panel order [place] Draw.  Procedure                               Abnormality         Status                     ---------                               -----------         ------                     Light Blue Top[[id number]]                                   Final result               Green Top (Gel)[[id number]]                                  Final result               Lavender Top[[id number]]                                     Final result               Gold Top - SST[[id number]]                                   Final result                 Please view results for these tests on the individual orders.    Lavender Top [[id number]] Collected:  12/05/19 0338    Specimen:  Blood Updated:  12/05/19 0445     Extra Tube hold for add-on     Comment: Auto resulted       Light Blue Top [[id number]] Collected:  12/05/19 0338    Specimen:  Blood Updated:  12/05/19 0445     Extra Tube hold for add-on     Comment: Auto resulted       Green Top (Gel) [[id number]] Collected:  12/05/19 0338    Specimen:  Blood Updated:  12/05/19 0445     Extra Tube Hold for add-ons.     Comment: Auto resulted.       Gold Top - SST [[id number]] Collected:  12/05/19 0338    Specimen:  Blood Updated:  12/05/19 0445     Extra Tube Hold for add-ons.     Comment: Auto resulted.       Comprehensive Metabolic Panel [[id number]]  (Abnormal) Collected:  12/05/19 0338    Specimen:  Blood Updated:  12/05/19 0421     Glucose 85 mg/dL      BUN 14 mg/dL      Creatinine 0.80 mg/dL      Sodium 144 mmol/L      Potassium 4.2 mmol/L      Chloride 104 mmol/L      CO2 29.2 mmol/L      Calcium 8.6 mg/dL      Total Protein 6.4 g/dL      Albumin 3.60 g/dL      ALT (SGPT) 16 U/L       AST (SGOT) 15 U/L      Alkaline Phosphatase 53 U/L      Total Bilirubin 0.4 mg/dL      eGFR Non African Amer 96 mL/min/1.73      Globulin 2.8 gm/dL      A/G Ratio 1.3 g/dL      BUN/Creatinine Ratio 17.5     Anion Gap 10.8 mmol/L     Narrative:       GFR Normal >60  Chronic Kidney Disease <60  Kidney Failure <15    Troponin [[id number]]  (Normal) Collected:  12/05/19 0338    Specimen:  Blood Updated:  12/05/19 0410     Troponin T <0.010 ng/mL     Narrative:       Troponin T Reference Range:  <= 0.03 ng/mL-   Negative for AMI  >0.03 ng/mL-     Abnormal for myocardial necrosis.  Clinicians would have to utilize clinical acumen, EKG, Troponin and serial changes to determine if it is an Acute Myocardial Infarction or myocardial injury due to an underlying chronic condition.     BNP [[id number]]  (Normal) Collected:  12/05/19 0338    Specimen:  Blood Updated:  12/05/19 0407     proBNP 468.4 pg/mL     Narrative:       Among patients with dyspnea, NT-proBNP is highly sensitive for the detection of acute congestive heart failure. In addition NT-proBNP of <300 pg/ml effectively rules out acute congestive heart failure with 99% negative predictive value.    Lactic Acid, Plasma [[id number]]  (Normal) Collected:  12/05/19 0338    Specimen:  Blood Updated:  12/05/19 0405     Lactate 0.6 mmol/L     CBC & Differential [[id number]] Collected:  12/05/19 0338    Specimen:  Blood Updated:  12/05/19 0349    Narrative:       The following orders were created for panel order CBC & Differential.  Procedure                               Abnormality         Status                     ---------                               -----------         ------                     CBC Auto Differential[[id number]]        Abnormal            Final result                 Please view results for these tests on the individual orders.    CBC Auto Differential [[id number]]  (Abnormal) Collected:  12/05/19 0338    Specimen:  Blood Updated:  12/05/19 0349     WBC  7.75 10*3/mm3      RBC 4.83 10*6/mm3      Hemoglobin 14.9 g/dL      Hematocrit 45.9 %      MCV 95.0 fL      MCH 30.8 pg      MCHC 32.5 g/dL      RDW 12.6 %      RDW-SD 44.4 fl      MPV 10.0 fL      Platelets 174 10*3/mm3      Neutrophil % 70.8 %      Lymphocyte % 15.9 %      Monocyte % 9.5 %      Eosinophil % 3.1 %      Basophil % 0.6 %      Immature Grans % 0.1 %      Neutrophils, Absolute 5.48 10*3/mm3      Lymphocytes, Absolute 1.23 10*3/mm3      Monocytes, Absolute 0.74 10*3/mm3      Eosinophils, Absolute 0.24 10*3/mm3      Basophils, Absolute 0.05 10*3/mm3      Immature Grans, Absolute 0.01 10*3/mm3      nRBC 0.0 /100 WBC     Blood Culture - Blood, Blood, Venous Line [[id number]] Collected:  12/05/19 0339    Specimen:  Blood, Venous Line Updated:  12/05/19 0343    Blood Culture - Blood, Blood, Venous Line [[id number]] Collected:  12/05/19 0338    Specimen:  Blood, Venous Line Updated:  12/05/19 0343                   Imaging Results (All)     Procedure Component Value Units Date/Time    XR Chest 2 View [[id number]] Collected:  12/05/19 0427     Updated:  12/05/19 0427    Narrative:       PA AND LATERAL CHEST X-RAY     CLINICAL HISTORY: [facility] Triage Protocol     COMPARISON: None.     FINDINGS: PA and lateral views of the chest were obtained. The lungs are  well expanded and appear clear. Heart size and pulmonary vascularity are  normal. No pleural effusions.             Impression:       1. No active disease.              Past Medical History:   Diagnosis Date   • Arthritis    • BPH (benign prostatic hyperplasia)    • Depression    • Dizziness     SINCE CRANIOTOMY YRS AGO  1976   • GERD (gastroesophageal reflux disease)    • Hyperlipemia    • Hypertension    • Seizure (CMS/HCC)     ON GABAPENTIN  LAST ONE 5 YRS AGO  HS CRANIOTOMY   • Shoulder pain, right    • Sleep apnea     DOESNT WEAR MACHINE       Assessment:  Active Hospital Problems    Diagnosis  POA   • **COPD exacerbation (CMS/HCC) [J44.1]  Yes   • Hypoxia  [R09.02]  Unknown   • Viral upper respiratory tract infection [J06.9]  Unknown   • Sleep apnea [G47.30]  Unknown   • Seizure (CMS/HCC) [R56.9]  Unknown   • Hypertension [I10]  Unknown   • Hyperlipemia [E78.5]  Unknown   • GERD (gastroesophageal reflux disease) [K21.9]  Unknown   • BPH (benign prostatic hyperplasia) [N40.0]  Unknown      Resolved Hospital Problems   No resolved problems to display.       Plan:  The patient is admitted to hospital we will continue with oxygen, bronchodilators and antibiotics.  Encourage patient to stop smoking.    Rey Ganrer MD  12/5/2019  11:21 AM

## 2019-12-05 NOTE — PLAN OF CARE
Problem: Patient Care Overview  Goal: Plan of Care Review  Outcome: Ongoing (interventions implemented as appropriate)   12/05/19 9980   Coping/Psychosocial   Plan of Care Reviewed With patient   Plan of Care Review   Progress improving   OTHER   Outcome Summary On 2L of oxygen. Self administering incentive spirometer at 2500 every hour. IV steroids given. Ambulating independently. Will CTM     Goal: Individualization and Mutuality  Outcome: Ongoing (interventions implemented as appropriate)    Goal: Discharge Needs Assessment  Outcome: Ongoing (interventions implemented as appropriate)    Goal: Interprofessional Rounds/Family Conf  Outcome: Ongoing (interventions implemented as appropriate)      Problem: Pain, Chronic (Adult)  Goal: Identify Related Risk Factors and Signs and Symptoms  Outcome: Ongoing (interventions implemented as appropriate)    Goal: Acceptable Pain/Comfort Level and Functional Ability  Outcome: Ongoing (interventions implemented as appropriate)      Problem: Chronic Obstructive Pulmonary Disease (Adult)  Goal: Signs and Symptoms of Listed Potential Problems Will be Absent, Minimized or Managed (Chronic Obstructive Pulmonary Disease)  Outcome: Ongoing (interventions implemented as appropriate)

## 2019-12-06 VITALS
SYSTOLIC BLOOD PRESSURE: 159 MMHG | HEIGHT: 68 IN | HEART RATE: 76 BPM | BODY MASS INDEX: 30.96 KG/M2 | TEMPERATURE: 97.5 F | OXYGEN SATURATION: 94 % | RESPIRATION RATE: 18 BRPM | DIASTOLIC BLOOD PRESSURE: 88 MMHG | WEIGHT: 204.3 LBS

## 2019-12-06 LAB
ANION GAP SERPL CALCULATED.3IONS-SCNC: 10.5 MMOL/L (ref 5–15)
BASOPHILS # BLD AUTO: 0.02 10*3/MM3 (ref 0–0.2)
BASOPHILS NFR BLD AUTO: 0.2 % (ref 0–1.5)
BUN BLD-MCNC: 19 MG/DL (ref 8–23)
BUN/CREAT SERPL: 29.7 (ref 7–25)
CALCIUM SPEC-SCNC: 8.9 MG/DL (ref 8.6–10.5)
CHLORIDE SERPL-SCNC: 103 MMOL/L (ref 98–107)
CO2 SERPL-SCNC: 26.5 MMOL/L (ref 22–29)
CREAT BLD-MCNC: 0.64 MG/DL (ref 0.76–1.27)
DEPRECATED RDW RBC AUTO: 40.6 FL (ref 37–54)
EOSINOPHIL # BLD AUTO: 0 10*3/MM3 (ref 0–0.4)
EOSINOPHIL NFR BLD AUTO: 0 % (ref 0.3–6.2)
ERYTHROCYTE [DISTWIDTH] IN BLOOD BY AUTOMATED COUNT: 11.9 % (ref 12.3–15.4)
GFR SERPL CREATININE-BSD FRML MDRD: 124 ML/MIN/1.73
GLUCOSE BLD-MCNC: 167 MG/DL (ref 65–99)
HCT VFR BLD AUTO: 41.9 % (ref 37.5–51)
HGB BLD-MCNC: 14.3 G/DL (ref 13–17.7)
IMM GRANULOCYTES # BLD AUTO: 0.1 10*3/MM3 (ref 0–0.05)
IMM GRANULOCYTES NFR BLD AUTO: 0.8 % (ref 0–0.5)
LYMPHOCYTES # BLD AUTO: 0.77 10*3/MM3 (ref 0.7–3.1)
LYMPHOCYTES NFR BLD AUTO: 5.9 % (ref 19.6–45.3)
MCH RBC QN AUTO: 31.8 PG (ref 26.6–33)
MCHC RBC AUTO-ENTMCNC: 34.1 G/DL (ref 31.5–35.7)
MCV RBC AUTO: 93.1 FL (ref 79–97)
MONOCYTES # BLD AUTO: 0.56 10*3/MM3 (ref 0.1–0.9)
MONOCYTES NFR BLD AUTO: 4.3 % (ref 5–12)
NEUTROPHILS # BLD AUTO: 11.63 10*3/MM3 (ref 1.7–7)
NEUTROPHILS NFR BLD AUTO: 88.8 % (ref 42.7–76)
NRBC BLD AUTO-RTO: 0 /100 WBC (ref 0–0.2)
PLATELET # BLD AUTO: 182 10*3/MM3 (ref 140–450)
PMV BLD AUTO: 10.2 FL (ref 6–12)
POTASSIUM BLD-SCNC: 4.7 MMOL/L (ref 3.5–5.2)
RBC # BLD AUTO: 4.5 10*6/MM3 (ref 4.14–5.8)
SODIUM BLD-SCNC: 140 MMOL/L (ref 136–145)
WBC NRBC COR # BLD: 13.08 10*3/MM3 (ref 3.4–10.8)

## 2019-12-06 PROCEDURE — G0008 ADMIN INFLUENZA VIRUS VAC: HCPCS | Performed by: HOSPITALIST

## 2019-12-06 PROCEDURE — 85025 COMPLETE CBC W/AUTO DIFF WBC: CPT | Performed by: HOSPITALIST

## 2019-12-06 PROCEDURE — 96376 TX/PRO/DX INJ SAME DRUG ADON: CPT

## 2019-12-06 PROCEDURE — 94618 PULMONARY STRESS TESTING: CPT

## 2019-12-06 PROCEDURE — G0378 HOSPITAL OBSERVATION PER HR: HCPCS

## 2019-12-06 PROCEDURE — 25010000002 INFLUENZA VAC SPLIT QUAD 0.5 ML SUSPENSION PREFILLED SYRINGE: Performed by: HOSPITALIST

## 2019-12-06 PROCEDURE — 25010000002 METHYLPREDNISOLONE PER 125 MG: Performed by: NURSE PRACTITIONER

## 2019-12-06 PROCEDURE — 90686 IIV4 VACC NO PRSV 0.5 ML IM: CPT | Performed by: HOSPITALIST

## 2019-12-06 PROCEDURE — 80048 BASIC METABOLIC PNL TOTAL CA: CPT | Performed by: HOSPITALIST

## 2019-12-06 PROCEDURE — 94799 UNLISTED PULMONARY SVC/PX: CPT

## 2019-12-06 RX ORDER — ALBUTEROL SULFATE 90 UG/1
2 AEROSOL, METERED RESPIRATORY (INHALATION) EVERY 4 HOURS PRN
Qty: 1 INHALER | Refills: 0 | Status: SHIPPED | OUTPATIENT
Start: 2019-12-06

## 2019-12-06 RX ORDER — PREDNISONE 20 MG/1
20 TABLET ORAL DAILY
Qty: 5 TABLET | Refills: 0 | Status: SHIPPED | OUTPATIENT
Start: 2019-12-06 | End: 2019-12-11

## 2019-12-06 RX ADMIN — LISINOPRIL 10 MG: 10 TABLET ORAL at 06:00

## 2019-12-06 RX ADMIN — GABAPENTIN 100 MG: 100 CAPSULE ORAL at 05:57

## 2019-12-06 RX ADMIN — METHYLPREDNISOLONE SODIUM SUCCINATE 60 MG: 125 INJECTION, POWDER, FOR SOLUTION INTRAMUSCULAR; INTRAVENOUS at 04:20

## 2019-12-06 RX ADMIN — IPRATROPIUM BROMIDE AND ALBUTEROL SULFATE 3 ML: 2.5; .5 SOLUTION RESPIRATORY (INHALATION) at 10:19

## 2019-12-06 RX ADMIN — SERTRALINE 100 MG: 100 TABLET, FILM COATED ORAL at 06:01

## 2019-12-06 RX ADMIN — INFLUENZA A VIRUS A/BRISBANE/02/2018 IVR-190 (H1N1) ANTIGEN (PROPIOLACTONE INACTIVATED), INFLUENZA A VIRUS A/KANSAS/14/2017 X-327 (H3N2) ANTIGEN (PROPIOLACTONE INACTIVATED), INFLUENZA B VIRUS B/MARYLAND/15/2016 ANTIGEN (PROPIOLACTONE INACTIVATED), INFLUENZA B VIRUS B/PHUKET/3073/2013 BVR-1B ANTIGEN (PROPIOLACTONE INACTIVATED) 0.5 ML: 15; 15; 15; 15 INJECTION, SUSPENSION INTRAMUSCULAR at 12:51

## 2019-12-06 RX ADMIN — SODIUM CHLORIDE, PRESERVATIVE FREE 10 ML: 5 INJECTION INTRAVENOUS at 08:07

## 2019-12-06 RX ADMIN — Medication 1000 MCG: at 06:01

## 2019-12-06 RX ADMIN — IPRATROPIUM BROMIDE AND ALBUTEROL SULFATE 3 ML: 2.5; .5 SOLUTION RESPIRATORY (INHALATION) at 07:06

## 2019-12-06 RX ADMIN — PANTOPRAZOLE SODIUM 40 MG: 40 TABLET, DELAYED RELEASE ORAL at 06:03

## 2019-12-06 RX ADMIN — FINASTERIDE 5 MG: 5 TABLET, FILM COATED ORAL at 06:01

## 2019-12-06 RX ADMIN — METHYLPREDNISOLONE SODIUM SUCCINATE 60 MG: 125 INJECTION, POWDER, FOR SOLUTION INTRAMUSCULAR; INTRAVENOUS at 12:51

## 2019-12-06 RX ADMIN — GUAIFENESIN 600 MG: 600 TABLET, EXTENDED RELEASE ORAL at 08:07

## 2019-12-06 NOTE — DISCHARGE SUMMARY
PHYSICIAN DISCHARGE SUMMARY                                                                        [facility]    Patient Identification:  Name: Russell Christine  Age: 69 y.o.  Sex: male  :  1950  MRN: [id number]  Primary Care Physician: Vicente Mathews MD    Admit date: 2019  Discharge date and time:2019  Discharged Condition: good    Discharge Diagnoses:  Active Hospital Problems    Diagnosis  POA   • **COPD exacerbation (CMS/HCC) [J44.1]  Yes   • Hypoxia [R09.02]  Unknown   • Viral upper respiratory tract infection [J06.9]  Unknown   • Sleep apnea [G47.30]  Unknown   • Seizure (CMS/HCC) [R56.9]  Unknown   • Hypertension [I10]  Unknown   • Hyperlipemia [E78.5]  Unknown   • GERD (gastroesophageal reflux disease) [K21.9]  Unknown   • BPH (benign prostatic hyperplasia) [N40.0]  Unknown      Resolved Hospital Problems   No resolved problems to display.          PMHX:   Past Medical History:   Diagnosis Date   • Arthritis    • BPH (benign prostatic hyperplasia)    • Depression    • Dizziness     SINCE CRANIOTOMY YRS AGO     • GERD (gastroesophageal reflux disease)    • Hyperlipemia    • Hypertension    • Seizure (CMS/HCC)     ON GABAPENTIN  LAST ONE 5 YRS AGO  HS CRANIOTOMY   • Shoulder pain, right    • Sleep apnea     DOESNT WEAR MACHINE     PSHX:   Past Surgical History:   Procedure Laterality Date   • CERVICAL FUSION     • CRANIOTOMY      BENIGN TUMOR BEHIND LEFT EYE     • LUMBAR FUSION      X 2   • GA RECONSTR TOTAL SHOULDER IMPLANT Right 10/12/2016    Procedure: RT TOTAL SHOULDER REVERSE  ARTHROPLASTY;  Surgeon: Gilberto Pickard MD;  Location: [facility] [place];  Service: Orthopedics       Hospital Course: Russell Christine  is a 69-year-old white male with history of arthritis, BPH, depression, dizziness, GERD, hypertension, hyperlipidemia, seizure disorder and sleep apnea who is admitted with history of  having cough productive of greenish colored sputum and having a fever with shortness of air over the last few days. He did not had any nausea or vomiting. He had some chest pain with coughing and felt pretty weak. Patient came to the ER and was started on some oxygen and bronchodilators. The patient was admitted for further work-up with COPD exacerbation. He is a smoker          The patient was feeling better after being in the hospital for couple days and looked well enough to go home.  He will continue with bronchodilators and some steroids.  His cultures were all negative and it was felt that he did not need any antibiotics at this time.  He will follow-up with his primary care in 1 week for continuing care.    Consults:     Consults     Date and Time Order Name Status Description    12/5/2019 0503 [facility] (on-call MD unless specified) Details Completed         Results from last 7 days   Lab Units 12/06/19  0446   WBC 10*3/mm3 13.08*   HEMOGLOBIN g/dL 14.3   HEMATOCRIT % 41.9   PLATELETS 10*3/mm3 182     Results from last 7 days   Lab Units 12/06/19  0446   SODIUM mmol/L 140   POTASSIUM mmol/L 4.7   CHLORIDE mmol/L 103   CO2 mmol/L 26.5   BUN mg/dL 19   CREATININE mg/dL 0.64*   GLUCOSE mg/dL 167*   CALCIUM mg/dL 8.9     Significant Diagnostic Studies:   Lab Results   Component Value Date    WBC 13.08 (H) 12/06/2019    HGB 14.3 12/06/2019    HCT 41.9 12/06/2019     12/06/2019     Lab Results   Component Value Date     12/06/2019    K 4.7 12/06/2019     12/06/2019    CO2 26.5 12/06/2019    BUN 19 12/06/2019    CREATININE 0.64 (L) 12/06/2019    GLUCOSE 167 (H) 12/06/2019     Lab Results   Component Value Date    CALCIUM 8.9 12/06/2019     Lab Results   Component Value Date    AST 15 12/05/2019    ALT 16 12/05/2019    ALKPHOS 53 12/05/2019     No results found for: APTT, INR  No results found for: COLORU, CLARITYU, SPECGRAV, PHUR, PROTEINUR, GLUCOSEU, KETONESU, BLOODU, NITRITE, LEUKOCYTESUR,  BILIRUBINUR, UROBILINOGEN, RBCUA, WBCUA, BACTERIA, UACOMMENT  Lab Results   Component Value Date    TROPONINT <0.010 12/05/2019     No components found for: HGBA1C;2  No components found for: TSH;2  Imaging Results (All)     Procedure Component Value Units Date/Time    XR Chest 2 View [[id number]] Collected:  12/05/19 0427     Updated:  12/05/19 2247    Narrative:       PA AND LATERAL CHEST X-RAY     CLINICAL HISTORY: SOA Triage Protocol     COMPARISON: None.     FINDINGS: PA and lateral views of the chest were obtained. The lungs are  well expanded and appear clear. Heart size and pulmonary vascularity are  normal. No pleural effusions.             Impression:       1. No active disease.     This report was finalized on 12/5/2019 10:44 PM by Troy Fortune M.D.           Lab Results (last 7 days)     Procedure Component Value Units Date/Time    CBC & Differential [[id number]] Collected:  12/06/19 0446    Specimen:  Blood Updated:  12/06/19 0603    Narrative:       The following orders were created for panel order CBC & Differential.  Procedure                               Abnormality         Status                     ---------                               -----------         ------                     CBC Auto Differential[[id number]]        Abnormal            Final result                 Please view results for these tests on the individual orders.    CBC Auto Differential [[id number]]  (Abnormal) Collected:  12/06/19 0446    Specimen:  Blood Updated:  12/06/19 0603     WBC 13.08 10*3/mm3      RBC 4.50 10*6/mm3      Hemoglobin 14.3 g/dL      Hematocrit 41.9 %      MCV 93.1 fL      MCH 31.8 pg      MCHC 34.1 g/dL      RDW 11.9 %      RDW-SD 40.6 fl      MPV 10.2 fL      Platelets 182 10*3/mm3      Neutrophil % 88.8 %      Lymphocyte % 5.9 %      Monocyte % 4.3 %      Eosinophil % 0.0 %      Basophil % 0.2 %      Immature Grans % 0.8 %      Neutrophils, Absolute 11.63 10*3/mm3      Lymphocytes, Absolute 0.77  10*3/mm3      Monocytes, Absolute 0.56 10*3/mm3      Eosinophils, Absolute 0.00 10*3/mm3      Basophils, Absolute 0.02 10*3/mm3      Immature Grans, Absolute 0.10 10*3/mm3      nRBC 0.0 /100 WBC     Basic Metabolic Panel [[id number]]  (Abnormal) Collected:  12/06/19 0446    Specimen:  Blood Updated:  12/06/19 0550     Glucose 167 mg/dL      BUN 19 mg/dL      Creatinine 0.64 mg/dL      Sodium 140 mmol/L      Potassium 4.7 mmol/L      Chloride 103 mmol/L      CO2 26.5 mmol/L      Calcium 8.9 mg/dL      eGFR Non African Amer 124 mL/min/1.73      BUN/Creatinine Ratio 29.7     Anion Gap 10.5 mmol/L     Narrative:       GFR Normal >60  Chronic Kidney Disease <60  Kidney Failure <15    Blood Culture - Blood, Blood, Venous Line [[id number]] Collected:  12/05/19 0339    Specimen:  Blood, Venous Line Updated:  12/06/19 0345     Blood Culture No growth at 24 hours    Blood Culture - Blood, Blood, Venous Line [[id number]] Collected:  12/05/19 0338    Specimen:  Blood, Venous Line Updated:  12/06/19 0345     Blood Culture No growth at 24 hours    Basic Metabolic Panel [[id number]]  (Abnormal) Collected:  12/05/19 0625    Specimen:  Blood Updated:  12/05/19 0726     Glucose 131 mg/dL      BUN 15 mg/dL      Creatinine 0.66 mg/dL      Sodium 143 mmol/L      Potassium 3.9 mmol/L      Chloride 102 mmol/L      CO2 29.0 mmol/L      Calcium 8.4 mg/dL      eGFR Non African Amer 120 mL/min/1.73      BUN/Creatinine Ratio 22.7     Anion Gap 12.0 mmol/L     Narrative:       GFR Normal >60  Chronic Kidney Disease <60  Kidney Failure <15    CBC (No Diff) [[id number]]  (Normal) Collected:  12/05/19 0625    Specimen:  Blood Updated:  12/05/19 0700     WBC 10.24 10*3/mm3      RBC 4.65 10*6/mm3      Hemoglobin 14.2 g/dL      Hematocrit 44.7 %      MCV 96.1 fL      MCH 30.5 pg      MCHC 31.8 g/dL      RDW 12.6 %      RDW-SD 44.6 fl      MPV 9.9 fL      Platelets 145 10*3/mm3     Respiratory Panel, PCR - Swab, Nasopharynx [[id number]]  (Normal) Collected:   12/05/19 0337    Specimen:  Swab from Nasopharynx Updated:  12/05/19 0500     ADENOVIRUS, PCR Not Detected     Coronavirus 229E Not Detected     Coronavirus HKU1 Not Detected     Coronavirus NL63 Not Detected     Coronavirus OC43 Not Detected     Human Metapneumovirus Not Detected     Human Rhinovirus/Enterovirus Not Detected     Influenza B PCR Not Detected     Parainfluenza Virus 1 Not Detected     Parainfluenza Virus 2 Not Detected     Parainfluenza Virus 3 Not Detected     Parainfluenza Virus 4 Not Detected     Bordetella pertussis pcr Not Detected     Influenza A H1 2009 PCR Not Detected     Chlamydophila pneumoniae PCR Not Detected     Mycoplasma pneumo by PCR Not Detected     Influenza A PCR Not Detected     Influenza A H3 Not Detected     Influenza A H1 Not Detected     RSV, PCR Not Detected     Bordetella parapertussis PCR Not Detected    [place] Draw [[id number]] Collected:  12/05/19 0338    Specimen:  Blood Updated:  12/05/19 0445    Narrative:       The following orders were created for panel order [place] Draw.  Procedure                               Abnormality         Status                     ---------                               -----------         ------                     Light Blue Top[[id number]]                                   Final result               Green Top (Gel)[[id number]]                                  Final result               Lavender Top[[id number]]                                     Final result               Gold Top - SST[[id number]]                                   Final result                 Please view results for these tests on the individual orders.    Lavender Top [[id number]] Collected:  12/05/19 0338    Specimen:  Blood Updated:  12/05/19 0445     Extra Tube hold for add-on     Comment: Auto resulted       Light Blue Top [[id number]] Collected:  12/05/19 0338    Specimen:  Blood Updated:  12/05/19 0445     Extra Tube hold for add-on     Comment: Auto resulted        Green Top (Gel) [[id number]] Collected:  12/05/19 0338    Specimen:  Blood Updated:  12/05/19 0445     Extra Tube Hold for add-ons.     Comment: Auto resulted.       Gold Top - SST [[id number]] Collected:  12/05/19 0338    Specimen:  Blood Updated:  12/05/19 0445     Extra Tube Hold for add-ons.     Comment: Auto resulted.       Comprehensive Metabolic Panel [[id number]]  (Abnormal) Collected:  12/05/19 0338    Specimen:  Blood Updated:  12/05/19 0421     Glucose 85 mg/dL      BUN 14 mg/dL      Creatinine 0.80 mg/dL      Sodium 144 mmol/L      Potassium 4.2 mmol/L      Chloride 104 mmol/L      CO2 29.2 mmol/L      Calcium 8.6 mg/dL      Total Protein 6.4 g/dL      Albumin 3.60 g/dL      ALT (SGPT) 16 U/L      AST (SGOT) 15 U/L      Alkaline Phosphatase 53 U/L      Total Bilirubin 0.4 mg/dL      eGFR Non African Amer 96 mL/min/1.73      Globulin 2.8 gm/dL      A/G Ratio 1.3 g/dL      BUN/Creatinine Ratio 17.5     Anion Gap 10.8 mmol/L     Narrative:       GFR Normal >60  Chronic Kidney Disease <60  Kidney Failure <15    Troponin [[id number]]  (Normal) Collected:  12/05/19 0338    Specimen:  Blood Updated:  12/05/19 0410     Troponin T <0.010 ng/mL     Narrative:       Troponin T Reference Range:  <= 0.03 ng/mL-   Negative for AMI  >0.03 ng/mL-     Abnormal for myocardial necrosis.  Clinicians would have to utilize clinical acumen, EKG, Troponin and serial changes to determine if it is an Acute Myocardial Infarction or myocardial injury due to an underlying chronic condition.     BNP [[id number]]  (Normal) Collected:  12/05/19 0338    Specimen:  Blood Updated:  12/05/19 0407     proBNP 468.4 pg/mL     Narrative:       Among patients with dyspnea, NT-proBNP is highly sensitive for the detection of acute congestive heart failure. In addition NT-proBNP of <300 pg/ml effectively rules out acute congestive heart failure with 99% negative predictive value.    Lactic Acid, Plasma [[id number]]  (Normal) Collected:  12/05/19 0338     "Specimen:  Blood Updated:  12/05/19 0405     Lactate 0.6 mmol/L     CBC & Differential [[id number]] Collected:  12/05/19 0338    Specimen:  Blood Updated:  12/05/19 0349    Narrative:       The following orders were created for panel order CBC & Differential.  Procedure                               Abnormality         Status                     ---------                               -----------         ------                     CBC Auto Differential[[id number]]        Abnormal            Final result                 Please view results for these tests on the individual orders.    CBC Auto Differential [[id number]]  (Abnormal) Collected:  12/05/19 0338    Specimen:  Blood Updated:  12/05/19 0349     WBC 7.75 10*3/mm3      RBC 4.83 10*6/mm3      Hemoglobin 14.9 g/dL      Hematocrit 45.9 %      MCV 95.0 fL      MCH 30.8 pg      MCHC 32.5 g/dL      RDW 12.6 %      RDW-SD 44.4 fl      MPV 10.0 fL      Platelets 174 10*3/mm3      Neutrophil % 70.8 %      Lymphocyte % 15.9 %      Monocyte % 9.5 %      Eosinophil % 3.1 %      Basophil % 0.6 %      Immature Grans % 0.1 %      Neutrophils, Absolute 5.48 10*3/mm3      Lymphocytes, Absolute 1.23 10*3/mm3      Monocytes, Absolute 0.74 10*3/mm3      Eosinophils, Absolute 0.24 10*3/mm3      Basophils, Absolute 0.05 10*3/mm3      Immature Grans, Absolute 0.01 10*3/mm3      nRBC 0.0 /100 WBC         /88 (BP Location: Right arm, Patient Position: Sitting)   Pulse 76   Temp 97.5 °F (36.4 °C) (Oral)   Resp 18   Ht 172.7 cm (68\")   Wt 92.7 kg (204 lb 4.8 oz)   SpO2 94%   BMI 31.06 kg/m²     Discharge Exam:  General Appearance:    Alert, cooperative, no distress                          Head:    Normocephalic, without obvious abnormality, atraumatic                          Eyes:                            Throat:   Lips, tongue, gums normal                          Neck:   Supple, symmetrical, trachea midline, no JVD                        Lungs:     Clear to auscultation " bilaterally, respirations unlabored                Chest Wall:    No tenderness or deformity                        Heart:    Regular rate and rhythm, S1 and S2 normal, no murmur,no  Rub or gallop                  Abdomen:     Soft, non-tender, bowel sounds active, no masses, no organomegaly                  Extremities:   Extremities normal, atraumatic, no cyanosis or edema                             Skin:   Skin is warm and dry,  no rashes or palpable lesions                  Neurologic:   no focal deficits noted     Disposition:  Home    Patient Instructions:      Discharge Medications      New Medications      Instructions Start Date   albuterol sulfate  (90 Base) MCG/ACT inhaler  Commonly known as:  PROVENTIL HFA;VENTOLIN HFA;PROAIR HFA   2 puffs, Inhalation, Every 4 Hours PRN      predniSONE 20 MG tablet  Commonly known as:  DELTASONE   20 mg, Oral, Daily         Continue These Medications      Instructions Start Date   cyclobenzaprine 10 MG tablet  Commonly known as:  FLEXERIL   10 mg, Oral, 3 Times Daily      finasteride 5 MG tablet  Commonly known as:  PROSCAR   5 mg, Oral, Every Morning      gabapentin 600 MG tablet  Commonly known as:  NEURONTIN   600 mg, Oral, 3 Times Daily      HYDROcodone-acetaminophen 5-325 MG per tablet  Commonly known as:  NORCO   1 tablet, Oral, Every 6 Hours PRN      lisinopril 10 MG tablet  Commonly known as:  PRINIVIL,ZESTRIL   10 mg, Oral, Every Morning      mirtazapine 45 MG disintegrating tablet  Commonly known as:  REMERON SOL-TAB   45 mg, Oral, Nightly      naproxen 500 MG tablet  Commonly known as:  NAPROSYN   500 mg, Oral, 2 Times Daily With Meals      omeprazole 40 MG capsule  Commonly known as:  priLOSEC   20 mg, Oral, 2 Times Daily      rosuvastatin 20 MG tablet  Commonly known as:  CRESTOR   40 mg, Oral, Nightly      sertraline 100 MG tablet  Commonly known as:  ZOLOFT   100 mg, Oral, Every Morning      traZODone 100 MG tablet  Commonly known as:  DESYREL    200 mg, Oral, Nightly      vitamin B-12 1000 MCG tablet  Commonly known as:  CYANOCOBALAMIN   1,000 mcg, Oral, Every Morning      vitamin D 1.25 MG (82907 UT) capsule capsule  Commonly known as:  ERGOCALCIFEROL   50,000 Units, Oral, Weekly           No future appointments.  Follow-up Information     Vicente Mathews MD Follow up in 1 week(s).    Specialty:  Internal Medicine  Contact information:  [address] [place], [place] [id number] [phone]                 Discharge Order (From admission, onward)    Start     Ordered    12/06/19 1155  Discharge patient  Once     Expected Discharge Date:  12/06/19    Discharge Disposition:  Home or Self Care    Physician of Record for Attribution - Please select from Treatment Team:  REY GARNER [[id number]]    Review needed by CMO to determine Physician of Record:  No       Question Answer Comment   Physician of Record for Attribution - Please select from Treatment Team REY GARNER    Review needed by CMO to determine Physician of Record No        12/06/19 1200          Total time spent discharging patient including evaluation,post hospitalization follow up,  medication and post hospitalization instructions and education total time exceeds 30 minutes.    Signed:  Rey Garner MD  12/6/2019  12:01 PM

## 2019-12-06 NOTE — PROGRESS NOTES
Exercise Oximetry    Patient Name:Russell Christine   MRN: [id number]   Date: 12/06/19             ROOM AIR BASELINE   SpO2% 92   Heart Rate 100    Blood Pressure      EXERCISE ON ROOM AIR SpO2% EXERCISE ON O2 @  LPM SpO2%   1 MINUTE 92 1 MINUTE    2 MINUTES 92 2 MINUTES    3 MINUTES 91 3 MINUTES    4 MINUTES 92 4 MINUTES    5 MINUTES 92 5 MINUTES    6 MINUTES 91 6 MINUTES               Distance Walked   Distance Walked   Dyspnea (Amado Scale)   Dyspnea (Amado Scale)   Fatigue (Amado Scale)   Fatigue (Amado Scale)   SpO2% Post Exercise  92 SpO2% Post Exercise   HR Post Exercise  106 HR Post Exercise   Time to Recovery   Time to Recovery     Comments: Ambulated patient on RA around nursing station without any complications. Patients SATS stayed above 88% throughout the walk. Results above.

## 2019-12-06 NOTE — PROGRESS NOTES
Discharge Planning Assessment  [facility]     Patient Name: Russell Christine  MRN: [id number]  Today's Date: 12/6/2019    Admit Date: 12/5/2019    Discharge Needs Assessment     Row Name 12/06/19 0907       Living Environment    Lives With  parent(s);spouse    Name(s) of Who Lives With Patient  wife and mom    Unique Family Situation  caregiver of his mom    Current Living Arrangements  home/apartment/condo    Primary Care Provided by  self    Provides Primary Care For  parent(s)    Family Caregiver if Needed  spouse    Family Caregiver Names  ailin    Quality of Family Relationships  helpful;involved;supportive    Able to Return to Prior Arrangements  yes       Resource/Environmental Concerns    Resource/Environmental Concerns  none    Transportation Concerns  car, none       Transition Planning    Patient/Family Anticipates Transition to  home with family    Patient/Family Anticipated Services at Transition  none    Transportation Anticipated  family or friend will provide;car, drives self       Discharge Needs Assessment    Readmission Within the Last 30 Days  no previous admission in last 30 days    Concerns to be Addressed  no discharge needs identified;denies needs/concerns at this time    Equipment Currently Used at Home  cane, straight    Anticipated Changes Related to Illness  none    Equipment Needed After Discharge  none        Discharge Plan     Row Name 12/06/19 0901       Plan    Plan  Home with wife- denies any dc needs    Provided post acute provider list?  Refused    Patient/Family in Agreement with Plan  yes    Plan Comments IMM 12/5. Spoke with pt at bedside, introduced self and explained CCP role. Verified facesheet and confirmed local pharmacy is [facility], corrected in EPIC. Pt denies problems with medication costs. Pt lives with his wife Ailin Christine ([phone]) and his mother in a s/s home. 5 steps to enter home. Pt and wife are primary care taker of his mom. Pt uses a  cane with mobility. Pt denies any other DME, HH or SNF hx. Pt states plan is home and denies any concerns or needs. Cynthia GOYAL/CCP        Destination      No service coordination in this encounter.      Durable Medical Equipment      No service coordination in this encounter.      Dialysis/Infusion      No service coordination in this encounter.      Home Medical Care      No service coordination in this encounter.      Therapy      No service coordination in this encounter.      Community Resources      No service coordination in this encounter.          Demographic Summary     Row Name 12/06/19 0906       General Information    Admission Type  inpatient    Referral Source  admission list    Reason for Consult  discharge planning    Preferred Language  English     Used During This Interaction  no       Contact Information    Permission Granted to Share Info With  family/designee        Functional Status    No documentation.       Psychosocial    No documentation.       Abuse/Neglect    No documentation.       Legal     Row Name 12/06/19 0906       Financial/Legal    Finance Comments  Denies advance directive.        Substance Abuse    No documentation.       Patient Forms    No documentation.           Sirena Slade, RN

## 2019-12-06 NOTE — PLAN OF CARE
Problem: Patient Care Overview  Goal: Plan of Care Review  Outcome: Ongoing (interventions implemented as appropriate)   12/06/19 0520   Coping/Psychosocial   Plan of Care Reviewed With patient   Plan of Care Review   Progress no change   OTHER   Outcome Summary Pt denies any pain or discomfort. Pt had off and on sleep. Pt is up ad maya. No resp distress noted.Pt on RA.Will continue to monitor.     Goal: Individualization and Mutuality  Outcome: Ongoing (interventions implemented as appropriate)      Problem: Chronic Obstructive Pulmonary Disease (Adult)  Goal: Signs and Symptoms of Listed Potential Problems Will be Absent, Minimized or Managed (Chronic Obstructive Pulmonary Disease)  Outcome: Ongoing (interventions implemented as appropriate)      Problem: Fall Risk (Adult)  Goal: Identify Related Risk Factors and Signs and Symptoms  Outcome: Ongoing (interventions implemented as appropriate)

## 2019-12-10 LAB
BACTERIA SPEC AEROBE CULT: NORMAL
BACTERIA SPEC AEROBE CULT: NORMAL

## 2022-06-26 ENCOUNTER — APPOINTMENT (OUTPATIENT)
Dept: GENERAL RADIOLOGY | Facility: HOSPITAL | Age: 72
End: 2022-06-26

## 2022-06-26 ENCOUNTER — APPOINTMENT (OUTPATIENT)
Dept: CARDIOLOGY | Facility: HOSPITAL | Age: 72
End: 2022-06-26

## 2022-06-26 ENCOUNTER — HOSPITAL ENCOUNTER (EMERGENCY)
Facility: HOSPITAL | Age: 72
Discharge: HOME | End: 2022-06-26
Attending: EMERGENCY MEDICINE | Admitting: EMERGENCY MEDICINE

## 2022-06-26 VITALS
BODY MASS INDEX: 33.19 KG/M2 | RESPIRATION RATE: 18 BRPM | OXYGEN SATURATION: 95 % | DIASTOLIC BLOOD PRESSURE: 93 MMHG | TEMPERATURE: 98.5 F | WEIGHT: 219 LBS | SYSTOLIC BLOOD PRESSURE: 111 MMHG | HEIGHT: 68 IN | HEART RATE: 78 BPM

## 2022-06-26 DIAGNOSIS — R60.0 PEDAL EDEMA: Primary | ICD-10-CM

## 2022-06-26 DIAGNOSIS — M25.552 PAIN OF LEFT HIP JOINT: ICD-10-CM

## 2022-06-26 LAB
ALBUMIN SERPL-MCNC: 3.5 G/DL (ref 3.5–5.2)
ALBUMIN/GLOB SERPL: 1.8 G/DL
ALP SERPL-CCNC: 50 U/L (ref 39–117)
ALT SERPL W P-5'-P-CCNC: 15 U/L (ref 1–41)
ANION GAP SERPL CALCULATED.3IONS-SCNC: 8 MMOL/L (ref 5–15)
AST SERPL-CCNC: 14 U/L (ref 1–40)
BASOPHILS # BLD AUTO: 0.06 10*3/MM3 (ref 0–0.2)
BASOPHILS NFR BLD AUTO: 0.9 % (ref 0–1.5)
BH CV LOWER VASCULAR LEFT COMMON FEMORAL AUGMENT: NORMAL
BH CV LOWER VASCULAR LEFT COMMON FEMORAL COMPETENT: NORMAL
BH CV LOWER VASCULAR LEFT COMMON FEMORAL COMPRESS: NORMAL
BH CV LOWER VASCULAR LEFT COMMON FEMORAL PHASIC: NORMAL
BH CV LOWER VASCULAR LEFT COMMON FEMORAL SPONT: NORMAL
BH CV LOWER VASCULAR LEFT DISTAL FEMORAL COMPRESS: NORMAL
BH CV LOWER VASCULAR LEFT GASTRONEMIUS COMPRESS: NORMAL
BH CV LOWER VASCULAR LEFT GREATER SAPH AK COMPRESS: NORMAL
BH CV LOWER VASCULAR LEFT GREATER SAPH BK COMPRESS: NORMAL
BH CV LOWER VASCULAR LEFT LESSER SAPH COMPRESS: NORMAL
BH CV LOWER VASCULAR LEFT MID FEMORAL AUGMENT: NORMAL
BH CV LOWER VASCULAR LEFT MID FEMORAL COMPETENT: NORMAL
BH CV LOWER VASCULAR LEFT MID FEMORAL COMPRESS: NORMAL
BH CV LOWER VASCULAR LEFT MID FEMORAL PHASIC: NORMAL
BH CV LOWER VASCULAR LEFT MID FEMORAL SPONT: NORMAL
BH CV LOWER VASCULAR LEFT PERONEAL COMPRESS: NORMAL
BH CV LOWER VASCULAR LEFT POPLITEAL AUGMENT: NORMAL
BH CV LOWER VASCULAR LEFT POPLITEAL COMPETENT: NORMAL
BH CV LOWER VASCULAR LEFT POPLITEAL COMPRESS: NORMAL
BH CV LOWER VASCULAR LEFT POPLITEAL PHASIC: NORMAL
BH CV LOWER VASCULAR LEFT POPLITEAL SPONT: NORMAL
BH CV LOWER VASCULAR LEFT POSTERIOR TIBIAL COMPRESS: NORMAL
BH CV LOWER VASCULAR LEFT PROFUNDA FEMORAL COMPRESS: NORMAL
BH CV LOWER VASCULAR LEFT PROXIMAL FEMORAL COMPRESS: NORMAL
BH CV LOWER VASCULAR LEFT SAPHENOFEMORAL JUNCTION COMPRESS: NORMAL
BH CV LOWER VASCULAR RIGHT COMMON FEMORAL AUGMENT: NORMAL
BH CV LOWER VASCULAR RIGHT COMMON FEMORAL COMPETENT: NORMAL
BH CV LOWER VASCULAR RIGHT COMMON FEMORAL COMPRESS: NORMAL
BH CV LOWER VASCULAR RIGHT COMMON FEMORAL PHASIC: NORMAL
BH CV LOWER VASCULAR RIGHT COMMON FEMORAL SPONT: NORMAL
BILIRUB SERPL-MCNC: 0.5 MG/DL (ref 0–1.2)
BUN SERPL-MCNC: 9 MG/DL (ref 8–23)
BUN/CREAT SERPL: 10.7 (ref 7–25)
CALCIUM SPEC-SCNC: 8.4 MG/DL (ref 8.6–10.5)
CHLORIDE SERPL-SCNC: 104 MMOL/L (ref 98–107)
CO2 SERPL-SCNC: 28 MMOL/L (ref 22–29)
CREAT SERPL-MCNC: 0.84 MG/DL (ref 0.76–1.27)
DEPRECATED RDW RBC AUTO: 45.1 FL (ref 37–54)
EGFRCR SERPLBLD CKD-EPI 2021: 93.2 ML/MIN/1.73
EOSINOPHIL # BLD AUTO: 0.21 10*3/MM3 (ref 0–0.4)
EOSINOPHIL NFR BLD AUTO: 3 % (ref 0.3–6.2)
ERYTHROCYTE [DISTWIDTH] IN BLOOD BY AUTOMATED COUNT: 12.8 % (ref 12.3–15.4)
GLOBULIN UR ELPH-MCNC: 2 GM/DL
GLUCOSE SERPL-MCNC: 101 MG/DL (ref 65–99)
HCT VFR BLD AUTO: 42 % (ref 37.5–51)
HGB BLD-MCNC: 13.6 G/DL (ref 13–17.7)
HOLD SPECIMEN: NORMAL
IMM GRANULOCYTES # BLD AUTO: 0.03 10*3/MM3 (ref 0–0.05)
IMM GRANULOCYTES NFR BLD AUTO: 0.4 % (ref 0–0.5)
INR PPP: 1 (ref 0.9–1.1)
LYMPHOCYTES # BLD AUTO: 1.99 10*3/MM3 (ref 0.7–3.1)
LYMPHOCYTES NFR BLD AUTO: 28.7 % (ref 19.6–45.3)
MAXIMAL PREDICTED HEART RATE: 149 BPM
MCH RBC QN AUTO: 30.9 PG (ref 26.6–33)
MCHC RBC AUTO-ENTMCNC: 32.4 G/DL (ref 31.5–35.7)
MCV RBC AUTO: 95.5 FL (ref 79–97)
MONOCYTES # BLD AUTO: 0.84 10*3/MM3 (ref 0.1–0.9)
MONOCYTES NFR BLD AUTO: 12.1 % (ref 5–12)
NEUTROPHILS NFR BLD AUTO: 3.81 10*3/MM3 (ref 1.7–7)
NEUTROPHILS NFR BLD AUTO: 54.9 % (ref 42.7–76)
NRBC BLD AUTO-RTO: 0 /100 WBC (ref 0–0.2)
NT-PROBNP SERPL-MCNC: 161 PG/ML (ref 0–900)
PLATELET # BLD AUTO: 155 10*3/MM3 (ref 140–450)
PMV BLD AUTO: 10 FL (ref 6–12)
POTASSIUM SERPL-SCNC: 3.9 MMOL/L (ref 3.5–5.2)
PROT SERPL-MCNC: 5.5 G/DL (ref 6–8.5)
PROTHROMBIN TIME: 13.1 SECONDS (ref 11.7–14.2)
RBC # BLD AUTO: 4.4 10*6/MM3 (ref 4.14–5.8)
SODIUM SERPL-SCNC: 140 MMOL/L (ref 136–145)
STRESS TARGET HR: 127 BPM
WBC NRBC COR # BLD: 6.94 10*3/MM3 (ref 3.4–10.8)
WHOLE BLOOD HOLD COAG: NORMAL
WHOLE BLOOD HOLD SPECIMEN: NORMAL

## 2022-06-26 PROCEDURE — 71045 X-RAY EXAM CHEST 1 VIEW: CPT

## 2022-06-26 PROCEDURE — 73502 X-RAY EXAM HIP UNI 2-3 VIEWS: CPT

## 2022-06-26 PROCEDURE — 73560 X-RAY EXAM OF KNEE 1 OR 2: CPT

## 2022-06-26 PROCEDURE — 96375 TX/PRO/DX INJ NEW DRUG ADDON: CPT

## 2022-06-26 PROCEDURE — 25010000002 MORPHINE PER 10 MG: Performed by: EMERGENCY MEDICINE

## 2022-06-26 PROCEDURE — 96374 THER/PROPH/DIAG INJ IV PUSH: CPT

## 2022-06-26 PROCEDURE — 85610 PROTHROMBIN TIME: CPT | Performed by: EMERGENCY MEDICINE

## 2022-06-26 PROCEDURE — 93971 EXTREMITY STUDY: CPT

## 2022-06-26 PROCEDURE — 83880 ASSAY OF NATRIURETIC PEPTIDE: CPT | Performed by: EMERGENCY MEDICINE

## 2022-06-26 PROCEDURE — 25010000002 FUROSEMIDE PER 20 MG: Performed by: EMERGENCY MEDICINE

## 2022-06-26 PROCEDURE — 99283 EMERGENCY DEPT VISIT LOW MDM: CPT

## 2022-06-26 PROCEDURE — 99284 EMERGENCY DEPT VISIT MOD MDM: CPT

## 2022-06-26 PROCEDURE — 85025 COMPLETE CBC W/AUTO DIFF WBC: CPT | Performed by: EMERGENCY MEDICINE

## 2022-06-26 PROCEDURE — 80053 COMPREHEN METABOLIC PANEL: CPT | Performed by: EMERGENCY MEDICINE

## 2022-06-26 RX ORDER — LIDOCAINE 50 MG/G
1 PATCH TOPICAL EVERY 24 HOURS
Qty: 6 EACH | Refills: 0 | Status: SHIPPED | OUTPATIENT
Start: 2022-06-26 | End: 2022-08-02

## 2022-06-26 RX ORDER — TRAMADOL HYDROCHLORIDE 50 MG/1
50 TABLET ORAL EVERY 6 HOURS PRN
COMMUNITY

## 2022-06-26 RX ORDER — FUROSEMIDE 10 MG/ML
20 INJECTION INTRAMUSCULAR; INTRAVENOUS ONCE
Status: COMPLETED | OUTPATIENT
Start: 2022-06-26 | End: 2022-06-26

## 2022-06-26 RX ORDER — SODIUM CHLORIDE 0.9 % (FLUSH) 0.9 %
10 SYRINGE (ML) INJECTION AS NEEDED
Status: DISCONTINUED | OUTPATIENT
Start: 2022-06-26 | End: 2022-06-26 | Stop reason: HOSPADM

## 2022-06-26 RX ORDER — LIDOCAINE 50 MG/G
1 PATCH TOPICAL
Status: DISCONTINUED | OUTPATIENT
Start: 2022-06-26 | End: 2022-06-26 | Stop reason: HOSPADM

## 2022-06-26 RX ORDER — HYDROCODONE BITARTRATE AND ACETAMINOPHEN 7.5; 325 MG/1; MG/1
1 TABLET ORAL ONCE
Status: COMPLETED | OUTPATIENT
Start: 2022-06-26 | End: 2022-06-26

## 2022-06-26 RX ORDER — FUROSEMIDE 20 MG/1
20 TABLET ORAL DAILY
Qty: 3 TABLET | Refills: 0 | Status: SHIPPED | OUTPATIENT
Start: 2022-06-26 | End: 2022-08-02

## 2022-06-26 RX ORDER — MORPHINE SULFATE 2 MG/ML
2 INJECTION, SOLUTION INTRAMUSCULAR; INTRAVENOUS ONCE
Status: COMPLETED | OUTPATIENT
Start: 2022-06-26 | End: 2022-06-26

## 2022-06-26 RX ADMIN — FUROSEMIDE 20 MG: 10 INJECTION, SOLUTION INTRAMUSCULAR; INTRAVENOUS at 10:49

## 2022-06-26 RX ADMIN — LIDOCAINE 1 PATCH: 50 PATCH TOPICAL at 10:46

## 2022-06-26 RX ADMIN — MORPHINE SULFATE 2 MG: 2 INJECTION, SOLUTION INTRAMUSCULAR; INTRAVENOUS at 12:43

## 2022-06-26 RX ADMIN — HYDROCODONE BITARTRATE AND ACETAMINOPHEN 1 TABLET: 7.5; 325 TABLET ORAL at 10:46

## 2022-08-02 ENCOUNTER — HOSPITAL ENCOUNTER (OUTPATIENT)
Dept: GENERAL RADIOLOGY | Facility: HOSPITAL | Age: 72
Discharge: HOME | End: 2022-08-02

## 2022-08-02 ENCOUNTER — PRE-ADMISSION TESTING (OUTPATIENT)
Dept: PREADMISSION TESTING | Facility: HOSPITAL | Age: 72
End: 2022-08-02

## 2022-08-02 VITALS
TEMPERATURE: 97.1 F | WEIGHT: 219 LBS | SYSTOLIC BLOOD PRESSURE: 148 MMHG | RESPIRATION RATE: 16 BRPM | OXYGEN SATURATION: 95 % | HEIGHT: 68 IN | BODY MASS INDEX: 33.19 KG/M2 | HEART RATE: 92 BPM | DIASTOLIC BLOOD PRESSURE: 84 MMHG

## 2022-08-02 LAB
ALBUMIN SERPL-MCNC: 3.8 G/DL (ref 3.5–5.2)
ALBUMIN/GLOB SERPL: 1.7 G/DL
ALP SERPL-CCNC: 59 U/L (ref 39–117)
ALT SERPL W P-5'-P-CCNC: 25 U/L (ref 1–41)
ANION GAP SERPL CALCULATED.3IONS-SCNC: 9.8 MMOL/L (ref 5–15)
APTT PPP: 28.8 SECONDS (ref 22.7–35.4)
AST SERPL-CCNC: 16 U/L (ref 1–40)
BACTERIA UR QL AUTO: ABNORMAL /HPF
BASOPHILS # BLD AUTO: 0.06 10*3/MM3 (ref 0–0.2)
BASOPHILS NFR BLD AUTO: 0.7 % (ref 0–1.5)
BILIRUB SERPL-MCNC: 0.5 MG/DL (ref 0–1.2)
BILIRUB UR QL STRIP: NEGATIVE
BUN SERPL-MCNC: 17 MG/DL (ref 8–23)
BUN/CREAT SERPL: 17.7 (ref 7–25)
CALCIUM SPEC-SCNC: 9 MG/DL (ref 8.6–10.5)
CHLORIDE SERPL-SCNC: 102 MMOL/L (ref 98–107)
CLARITY UR: CLEAR
CO2 SERPL-SCNC: 27.2 MMOL/L (ref 22–29)
COLOR UR: YELLOW
CREAT SERPL-MCNC: 0.96 MG/DL (ref 0.76–1.27)
DEPRECATED RDW RBC AUTO: 43.9 FL (ref 37–54)
EGFRCR SERPLBLD CKD-EPI 2021: 84 ML/MIN/1.73
EOSINOPHIL # BLD AUTO: 0.08 10*3/MM3 (ref 0–0.4)
EOSINOPHIL NFR BLD AUTO: 1 % (ref 0.3–6.2)
ERYTHROCYTE [DISTWIDTH] IN BLOOD BY AUTOMATED COUNT: 12.3 % (ref 12.3–15.4)
GLOBULIN UR ELPH-MCNC: 2.3 GM/DL
GLUCOSE SERPL-MCNC: 101 MG/DL (ref 65–99)
GLUCOSE UR STRIP-MCNC: NEGATIVE MG/DL
HBA1C MFR BLD: 5.5 % (ref 4.8–5.6)
HCT VFR BLD AUTO: 46.5 % (ref 37.5–51)
HGB BLD-MCNC: 14.7 G/DL (ref 13–17.7)
HGB UR QL STRIP.AUTO: NEGATIVE
HYALINE CASTS UR QL AUTO: ABNORMAL /LPF
IMM GRANULOCYTES # BLD AUTO: 0.03 10*3/MM3 (ref 0–0.05)
IMM GRANULOCYTES NFR BLD AUTO: 0.4 % (ref 0–0.5)
INR PPP: 1 (ref 0.9–1.1)
KETONES UR QL STRIP: NEGATIVE
LEUKOCYTE ESTERASE UR QL STRIP.AUTO: ABNORMAL
LYMPHOCYTES # BLD AUTO: 1.57 10*3/MM3 (ref 0.7–3.1)
LYMPHOCYTES NFR BLD AUTO: 19.5 % (ref 19.6–45.3)
MCH RBC QN AUTO: 30.4 PG (ref 26.6–33)
MCHC RBC AUTO-ENTMCNC: 31.6 G/DL (ref 31.5–35.7)
MCV RBC AUTO: 96.1 FL (ref 79–97)
MONOCYTES # BLD AUTO: 0.51 10*3/MM3 (ref 0.1–0.9)
MONOCYTES NFR BLD AUTO: 6.3 % (ref 5–12)
NEUTROPHILS NFR BLD AUTO: 5.82 10*3/MM3 (ref 1.7–7)
NEUTROPHILS NFR BLD AUTO: 72.1 % (ref 42.7–76)
NITRITE UR QL STRIP: NEGATIVE
NRBC BLD AUTO-RTO: 0.1 /100 WBC (ref 0–0.2)
PH UR STRIP.AUTO: <=5 [PH] (ref 5–8)
PLATELET # BLD AUTO: 221 10*3/MM3 (ref 140–450)
PMV BLD AUTO: 9.3 FL (ref 6–12)
POTASSIUM SERPL-SCNC: 4.7 MMOL/L (ref 3.5–5.2)
PROT SERPL-MCNC: 6.1 G/DL (ref 6–8.5)
PROT UR QL STRIP: NEGATIVE
PROTHROMBIN TIME: 13.1 SECONDS (ref 11.7–14.2)
QT INTERVAL: 396 MS
RBC # BLD AUTO: 4.84 10*6/MM3 (ref 4.14–5.8)
RBC # UR STRIP: ABNORMAL /HPF
REF LAB TEST METHOD: ABNORMAL
SODIUM SERPL-SCNC: 139 MMOL/L (ref 136–145)
SP GR UR STRIP: 1.01 (ref 1–1.03)
SQUAMOUS #/AREA URNS HPF: ABNORMAL /HPF
UROBILINOGEN UR QL STRIP: ABNORMAL
WBC # UR STRIP: ABNORMAL /HPF
WBC NRBC COR # BLD: 8.07 10*3/MM3 (ref 3.4–10.8)

## 2022-08-02 PROCEDURE — 85730 THROMBOPLASTIN TIME PARTIAL: CPT

## 2022-08-02 PROCEDURE — 73502 X-RAY EXAM HIP UNI 2-3 VIEWS: CPT

## 2022-08-02 PROCEDURE — 85610 PROTHROMBIN TIME: CPT

## 2022-08-02 PROCEDURE — 36415 COLL VENOUS BLD VENIPUNCTURE: CPT

## 2022-08-02 PROCEDURE — 81001 URINALYSIS AUTO W/SCOPE: CPT

## 2022-08-02 PROCEDURE — 93005 ELECTROCARDIOGRAM TRACING: CPT

## 2022-08-02 PROCEDURE — 85025 COMPLETE CBC W/AUTO DIFF WBC: CPT

## 2022-08-02 PROCEDURE — 83036 HEMOGLOBIN GLYCOSYLATED A1C: CPT

## 2022-08-02 PROCEDURE — 93010 ELECTROCARDIOGRAM REPORT: CPT | Performed by: INTERNAL MEDICINE

## 2022-08-02 PROCEDURE — 80053 COMPREHEN METABOLIC PANEL: CPT

## 2022-08-02 ASSESSMENT — HOOS JR
HOOS JR SCORE: 18
HOOS JR SCORE: 42.281

## 2022-08-02 NOTE — DISCHARGE INSTRUCTIONS
Take the following medications the morning of surgery:    GABAPENTIN AND OMEPRAZOLE.  BRING YOUR ALBUTEROL INHALER WITH YOU.    TIME OF ARRIVAL WILL BE CALLED TO YOU BY DR JUSTIN'S OFFICE THE DAY BEFORE SURGERY      If you are on prescription narcotic pain medication to control your pain you may also take that medication the morning of surgery.    General Instructions:  Do not eat solid food after midnight the night before surgery.  You may drink clear liquids day of surgery but must stop at least one hour before your hospital arrival time.  It is beneficial for you to have a clear drink that contains carbohydrates the day of surgery.  We suggest a 12 to 20 ounce bottle of Gatorade or Powerade for non-diabetic patients or a 12 to 20 ounce bottle of G2 or Powerade Zero for diabetic patients. (Pediatric patients, are not advised to drink a 12 to 20 ounce carbohydrate drink)    Clear liquids are liquids you can see through.  Nothing red in color.     Plain water                               Sports drinks  Sodas                                   Gelatin (Jell-O)  Fruit juices without pulp such as white grape juice and apple juice  Popsicles that contain no fruit or yogurt  Tea or coffee (no cream or milk added)  Gatorade / Powerade  G2 / Powerade Zero        Patients who avoid smoking, chewing tobacco and alcohol for 4 weeks prior to surgery have a reduced risk of post-operative complications.  Quit smoking as many days before surgery as you can.  Do not smoke, use chewing tobacco or drink alcohol the day of surgery.   If applicable bring your C-PAP/ BI-PAP machine.  Bring any papers given to you in the doctor’s office.  Wear clean comfortable clothes.  Do not wear contact lenses, false eyelashes or make-up.  Bring a case for your glasses.   Bring crutches or walker if applicable.  Remove all piercings.  Leave jewelry and any other valuables at home.  Hair extensions with metal clips must be removed prior to  surgery.  The Pre-Admission Testing nurse will instruct you to bring medications if unable to obtain an accurate list in Pre-Admission Testing.        Preventing a Surgical Site Infection:  For 2 to 3 days before surgery, avoid shaving with a razor because the razor can irritate skin and make it easier to develop an infection.    Any areas of open skin can increase the risk of a post-operative wound infection by allowing bacteria to enter and travel throughout the body.  Notify your surgeon if you have any skin wounds / rashes even if it is not near the expected surgical site.  The area will need assessed to determine if surgery should be delayed until it is healed.  The night prior to surgery shower using a fresh bar of anti-bacterial soap (such as Dial) and clean washcloth.  Sleep in a clean bed with clean clothing.  Do not allow pets to sleep with you.  Shower on the morning of surgery using a fresh bar of anti-bacterial soap (such as Dial) and clean washcloth.  Dry with a clean towel and dress in clean clothing.  Ask your surgeon if you will be receiving antibiotics prior to surgery.  Make sure you, your family, and all healthcare providers clean their hands with soap and water or an alcohol based hand  before caring for you or your wound.    Day of surgery:  Your arrival time is approximately two hours before your scheduled surgery time.  Upon arrival, a Pre-op nurse and Anesthesiologist will review your health history, obtain vital signs, and answer questions you may have.  The only belongings needed at this time will be a list of your home medications and if applicable your C-PAP/BI-PAP machine.  A Pre-op nurse will start an IV and you may receive medication in preparation for surgery, including something to help you relax.     Please be aware that surgery does come with discomfort.  We want to make every effort to control your discomfort so please discuss any uncontrolled symptoms with your nurse.    Your doctor will most likely have prescribed pain medications.      If you are going home after surgery you will receive individualized written care instructions before being discharged.  A responsible adult must drive you to and from the hospital on the day of your surgery and stay with you for 24 hours.  Discharge prescriptions can be filled by the hospital pharmacy during regular pharmacy hours.  If you are having surgery late in the day/evening your prescription may be e-prescribed to your pharmacy.  Please verify your pharmacy hours or chose a 24 hour pharmacy to avoid not having access to your prescription because your pharmacy has closed for the day.    If you are staying overnight following surgery, you will be transported to your hospital room following the recovery period.  [facility] has all private rooms.    If you have any questions please call Pre-Admission Testing at [phone].  Deductibles and co-payments are collected on the day of service. Please be prepared to pay the required co-pay, deductible or deposit on the day of service as defined by your plan.    Patient Education for Self-Quarantine Process    Following your COVID testing, we strongly recommend that you wear a mask when you are with other people and practice social distancing.   Limit your activities to only required outings.  Wash your hands with soap and water frequently for at least 20 seconds.   Avoid touching your eyes, nose and mouth with unwashed hands.  Do not share anything - utensils, drinking glasses, food from the same bowl.   Sanitize household surfaces daily. Include all high touch areas (door handles, light switches, phones, countertops, etc.)    Call your surgeon immediately if you experience any of the following symptoms:  Sore Throat  Shortness of Breath or difficulty breathing  Cough  Chills  Body soreness or muscle pain  Headache  Fever  New loss of taste or smell  Do not arrive for your surgery  ill.  Your procedure will need to be rescheduled to another time.  You will need to call your physician before the day of surgery to avoid any unnecessary exposure to hospital staff as well as other patients.         CHLORHEXIDINE CLOTH INSTRUCTIONS  The morning of surgery follow these instructions using the Chlorhexidine cloths you've been given.  These steps reduce bacteria on the body.  Do not use the cloths near your eyes, ears mouth, genitalia or on open wounds.  Throw the cloths away after use but do not try to flush them down a toilet.      Open and remove one cloth at a time from the package.    Leave the cloth unfolded and begin the bathing.  Massage the skin with the cloths using gentle pressure to remove bacteria.  Do not scrub harshly.   Follow the steps below with one 2% CHG cloth per area (6 total cloths).  One cloth for neck, shoulders and chest.  One cloth for both arms, hands, fingers and underarms (do underarms last).  One cloth for the abdomen followed by groin.  One cloth for right leg and foot including between the toes.  One cloth for left leg and foot including between the toes.  The last cloth is to be used for the back of the neck, back and buttocks.    Allow the CHG to air dry 3 minutes on the skin which will give it time to work and decrease the chance of irritation.  The skin may feel sticky until it is dry.  Do not rinse with water or any other liquid or you will lose the beneficial effects of the CHG.  If mild skin irritation occurs, do rinse the skin to remove the CHG.  Report this to the nurse at time of admission.  Do not apply lotions, creams, ointments, deodorants or perfumes after using the clothes. Dress in clean clothes before coming to the hospital.    BACTROBAN NASAL OINTMENT  There are many germs normally in your nose. Bactroban is an ointment that will help reduce these germs. Please follow these instructions for Bactroban use:      ____The day before surgery in the  morning  Date________    ____The day before surgery in the evening              Date________    ____The day of surgery in the morning    Date________    **Squirt ½ package of Bactroban Ointment onto a cotton applicator and apply to inside of 1st nostril.  Squirt the remaining Bactroban and apply to the inside of the other nostril.

## 2022-08-05 ENCOUNTER — LAB (OUTPATIENT)
Dept: LAB | Facility: HOSPITAL | Age: 72
End: 2022-08-05

## 2022-08-05 LAB — SARS-COV-2 ORF1AB RESP QL NAA+PROBE: NOT DETECTED

## 2022-08-05 PROCEDURE — U0004 COV-19 TEST NON-CDC HGH THRU: HCPCS

## 2022-08-05 PROCEDURE — C9803 HOPD COVID-19 SPEC COLLECT: HCPCS

## 2022-09-21 ENCOUNTER — APPOINTMENT (OUTPATIENT)
Dept: PREADMISSION TESTING | Facility: HOSPITAL | Age: 72
End: 2022-09-21

## 2022-12-06 ENCOUNTER — APPOINTMENT (OUTPATIENT)
Dept: GENERAL RADIOLOGY | Facility: HOSPITAL | Age: 72
End: 2022-12-06

## 2022-12-06 ENCOUNTER — HOSPITAL ENCOUNTER (INPATIENT)
Facility: HOSPITAL | Age: 72
LOS: 4 days | Discharge: HOME | End: 2022-12-10
Attending: EMERGENCY MEDICINE

## 2022-12-06 ENCOUNTER — APPOINTMENT (OUTPATIENT)
Dept: CT IMAGING | Facility: HOSPITAL | Age: 72
End: 2022-12-06

## 2022-12-06 DIAGNOSIS — J96.01 ACUTE RESPIRATORY FAILURE WITH HYPOXIA AND HYPERCAPNIA: ICD-10-CM

## 2022-12-06 DIAGNOSIS — S09.90XA INJURY OF HEAD, INITIAL ENCOUNTER: ICD-10-CM

## 2022-12-06 DIAGNOSIS — J81.0 ACUTE PULMONARY EDEMA: ICD-10-CM
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
## 2022-12-06 DIAGNOSIS — S01.312A LACERATION OF LEFT EAR, INITIAL ENCOUNTER: ICD-10-CM

## 2022-12-06 DIAGNOSIS — S39.91XA BLUNT TRAUMA TO ABDOMEN, INITIAL ENCOUNTER: ICD-10-CM

## 2022-12-06 DIAGNOSIS — J96.02 ACUTE RESPIRATORY FAILURE WITH HYPOXIA AND HYPERCAPNIA: ICD-10-CM

## 2022-12-06 DIAGNOSIS — N39.0 ACUTE UTI: ICD-10-CM

## 2022-12-06 DIAGNOSIS — Z96.611 STATUS POST REVERSE TOTAL REPLACEMENT OF RIGHT SHOULDER: ICD-10-CM

## 2022-12-06 DIAGNOSIS — R77.8 ELEVATED TROPONIN: ICD-10-CM
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
## 2022-12-06 PROBLEM — J44.9 COPD (CHRONIC OBSTRUCTIVE PULMONARY DISEASE) (HCC): Status: ACTIVE | Noted: 2022-12-06

## 2022-12-06 PROBLEM — R79.89 ELEVATED BRAIN NATRIURETIC PEPTIDE (BNP) LEVEL: Status: ACTIVE | Noted: 2022-12-06

## 2022-12-06 PROBLEM — G93.41 ACUTE METABOLIC ENCEPHALOPATHY: Status: ACTIVE | Noted: 2022-12-06

## 2022-12-06 LAB
ALBUMIN SERPL-MCNC: 3.2 G/DL (ref 3.5–5.2)
ALBUMIN/GLOB SERPL: 1.1 G/DL
ALP SERPL-CCNC: 74 U/L (ref 39–117)
ALT SERPL W P-5'-P-CCNC: 29 U/L (ref 1–41)
AMPHET+METHAMPHET UR QL: POSITIVE
ANION GAP SERPL CALCULATED.3IONS-SCNC: 7.7 MMOL/L (ref 5–15)
ANION GAP SERPL CALCULATED.3IONS-SCNC: 9.8 MMOL/L (ref 5–15)
APTT PPP: 32.8 SECONDS (ref 22.7–35.4)
ARTERIAL PATENCY WRIST A: ABNORMAL
ARTERIAL PATENCY WRIST A: POSITIVE
ARTERIAL PATENCY WRIST A: POSITIVE
AST SERPL-CCNC: 33 U/L (ref 1–40)
ATMOSPHERIC PRESS: 752.7 MMHG
ATMOSPHERIC PRESS: 753.9 MMHG
ATMOSPHERIC PRESS: 754.6 MMHG
B PARAPERT DNA SPEC QL NAA+PROBE: NOT DETECTED
B PERT DNA SPEC QL NAA+PROBE: NOT DETECTED
BACTERIA UR QL AUTO: ABNORMAL /HPF
BARBITURATES UR QL SCN: NEGATIVE
BASE EXCESS BLDA CALC-SCNC: 1 MMOL/L (ref 0–2)
BASE EXCESS BLDA CALC-SCNC: 1.6 MMOL/L (ref 0–2)
BASE EXCESS BLDA CALC-SCNC: 4.1 MMOL/L (ref 0–2)
BASOPHILS # BLD AUTO: 0.03 10*3/MM3 (ref 0–0.2)
BASOPHILS # BLD AUTO: 0.05 10*3/MM3 (ref 0–0.2)
BASOPHILS NFR BLD AUTO: 0.4 % (ref 0–1.5)
BASOPHILS NFR BLD AUTO: 0.6 % (ref 0–1.5)
BDY SITE: ABNORMAL
BENZODIAZ UR QL SCN: NEGATIVE
BILIRUB SERPL-MCNC: 0.4 MG/DL (ref 0–1.2)
BILIRUB UR QL STRIP: NEGATIVE
BUN SERPL-MCNC: 19 MG/DL (ref 8–23)
BUN SERPL-MCNC: 22 MG/DL (ref 8–23)
BUN/CREAT SERPL: 15.8 (ref 7–25)
BUN/CREAT SERPL: 16.5 (ref 7–25)
C PNEUM DNA NPH QL NAA+NON-PROBE: NOT DETECTED
CALCIUM SPEC-SCNC: 8.6 MG/DL (ref 8.6–10.5)
CALCIUM SPEC-SCNC: 8.8 MG/DL (ref 8.6–10.5)
CANNABINOIDS SERPL QL: NEGATIVE
CHLORIDE SERPL-SCNC: 104 MMOL/L (ref 98–107)
CHLORIDE SERPL-SCNC: 106 MMOL/L (ref 98–107)
CK SERPL-CCNC: 116 U/L (ref 20–200)
CLARITY UR: ABNORMAL
CO2 SERPL-SCNC: 26.2 MMOL/L (ref 22–29)
CO2 SERPL-SCNC: 29.3 MMOL/L (ref 22–29)
COCAINE UR QL: NEGATIVE
COLOR UR: ABNORMAL
CREAT SERPL-MCNC: 1.15 MG/DL (ref 0.76–1.27)
CREAT SERPL-MCNC: 1.39 MG/DL (ref 0.76–1.27)
D-LACTATE SERPL-SCNC: 0.7 MMOL/L (ref 0.5–2)
DEPRECATED RDW RBC AUTO: 41.9 FL (ref 37–54)
DEPRECATED RDW RBC AUTO: 42.2 FL (ref 37–54)
EGFRCR SERPLBLD CKD-EPI 2021: 53.9 ML/MIN/1.73
EGFRCR SERPLBLD CKD-EPI 2021: 67.6 ML/MIN/1.73
EOSINOPHIL # BLD AUTO: 0.01 10*3/MM3 (ref 0–0.4)
EOSINOPHIL # BLD AUTO: 0.02 10*3/MM3 (ref 0–0.4)
EOSINOPHIL NFR BLD AUTO: 0.1 % (ref 0.3–6.2)
EOSINOPHIL NFR BLD AUTO: 0.2 % (ref 0.3–6.2)
ERYTHROCYTE [DISTWIDTH] IN BLOOD BY AUTOMATED COUNT: 12.1 % (ref 12.3–15.4)
ERYTHROCYTE [DISTWIDTH] IN BLOOD BY AUTOMATED COUNT: 12.2 % (ref 12.3–15.4)
ETHANOL BLD-MCNC: <10 MG/DL (ref 0–10)
ETHANOL UR QL: <0.01 %
FLUAV SUBTYP SPEC NAA+PROBE: NOT DETECTED
FLUBV RNA ISLT QL NAA+PROBE: NOT DETECTED
GAS FLOW AIRWAY: 3.5 LPM
GLOBULIN UR ELPH-MCNC: 2.8 GM/DL
GLUCOSE BLDC GLUCOMTR-MCNC: 106 MG/DL (ref 70–130)
GLUCOSE BLDC GLUCOMTR-MCNC: 113 MG/DL (ref 70–130)
GLUCOSE BLDC GLUCOMTR-MCNC: 126 MG/DL (ref 70–130)
GLUCOSE BLDC GLUCOMTR-MCNC: 91 MG/DL (ref 70–130)
GLUCOSE BLDC GLUCOMTR-MCNC: 97 MG/DL (ref 70–130)
GLUCOSE BLDC GLUCOMTR-MCNC: 98 MG/DL (ref 70–130)
GLUCOSE SERPL-MCNC: 117 MG/DL (ref 65–99)
GLUCOSE SERPL-MCNC: 130 MG/DL (ref 65–99)
GLUCOSE UR STRIP-MCNC: NEGATIVE MG/DL
GRAN CASTS URNS QL MICRO: ABNORMAL /LPF
HADV DNA SPEC NAA+PROBE: NOT DETECTED
HBA1C MFR BLD: 5.7 % (ref 4.8–5.6)
HCO3 BLDA-SCNC: 30.2 MMOL/L (ref 22–28)
HCO3 BLDA-SCNC: 30.8 MMOL/L (ref 22–28)
HCO3 BLDA-SCNC: 32.7 MMOL/L (ref 22–28)
HCOV 229E RNA SPEC QL NAA+PROBE: NOT DETECTED
HCOV HKU1 RNA SPEC QL NAA+PROBE: NOT DETECTED
HCOV NL63 RNA SPEC QL NAA+PROBE: NOT DETECTED
HCOV OC43 RNA SPEC QL NAA+PROBE: NOT DETECTED
HCT VFR BLD AUTO: 38.8 % (ref 37.5–51)
HCT VFR BLD AUTO: 43 % (ref 37.5–51)
HGB BLD-MCNC: 12.7 G/DL (ref 13–17.7)
HGB BLD-MCNC: 13.8 G/DL (ref 13–17.7)
HGB UR QL STRIP.AUTO: ABNORMAL
HMPV RNA NPH QL NAA+NON-PROBE: NOT DETECTED
HPIV1 RNA ISLT QL NAA+PROBE: NOT DETECTED
HPIV2 RNA SPEC QL NAA+PROBE: NOT DETECTED
HPIV3 RNA NPH QL NAA+PROBE: NOT DETECTED
HPIV4 P GENE NPH QL NAA+PROBE: NOT DETECTED
HYALINE CASTS UR QL AUTO: ABNORMAL /LPF
IMM GRANULOCYTES # BLD AUTO: 0.04 10*3/MM3 (ref 0–0.05)
IMM GRANULOCYTES NFR BLD AUTO: 0.5 % (ref 0–0.5)
INHALED O2 CONCENTRATION: 30 %
INHALED O2 CONCENTRATION: 30 %
INR PPP: 1.06 (ref 0.9–1.1)
KETONES UR QL STRIP: ABNORMAL
LEUKOCYTE ESTERASE UR QL STRIP.AUTO: ABNORMAL
LYMPHOCYTES # BLD AUTO: 0.64 10*3/MM3 (ref 0.7–3.1)
LYMPHOCYTES # BLD AUTO: 1.09 10*3/MM3 (ref 0.7–3.1)
LYMPHOCYTES NFR BLD AUTO: 13.5 % (ref 19.6–45.3)
LYMPHOCYTES NFR BLD AUTO: 7.5 % (ref 19.6–45.3)
M PNEUMO IGG SER IA-ACNC: NOT DETECTED
MCH RBC QN AUTO: 30.7 PG (ref 26.6–33)
MCH RBC QN AUTO: 30.8 PG (ref 26.6–33)
MCHC RBC AUTO-ENTMCNC: 32.1 G/DL (ref 31.5–35.7)
MCHC RBC AUTO-ENTMCNC: 32.7 G/DL (ref 31.5–35.7)
MCV RBC AUTO: 93.9 FL (ref 79–97)
MCV RBC AUTO: 95.8 FL (ref 79–97)
METHADONE UR QL SCN: NEGATIVE
MODALITY: ABNORMAL
MONOCYTES # BLD AUTO: 0.79 10*3/MM3 (ref 0.1–0.9)
MONOCYTES # BLD AUTO: 0.94 10*3/MM3 (ref 0.1–0.9)
MONOCYTES NFR BLD AUTO: 11.7 % (ref 5–12)
MONOCYTES NFR BLD AUTO: 9.2 % (ref 5–12)
NEUTROPHILS NFR BLD AUTO: 5.95 10*3/MM3 (ref 1.7–7)
NEUTROPHILS NFR BLD AUTO: 7 10*3/MM3 (ref 1.7–7)
NEUTROPHILS NFR BLD AUTO: 73.8 % (ref 42.7–76)
NEUTROPHILS NFR BLD AUTO: 81.8 % (ref 42.7–76)
NITRITE UR QL STRIP: POSITIVE
NRBC BLD AUTO-RTO: 0 /100 WBC (ref 0–0.2)
NT-PROBNP SERPL-MCNC: 2489 PG/ML (ref 0–900)
O2 A-A PPRESDIFF RESPIRATORY: 0.5 MMHG
O2 A-A PPRESDIFF RESPIRATORY: 0.6 MMHG
OPIATES UR QL: NEGATIVE
OXYCODONE UR QL SCN: NEGATIVE
PCO2 BLDA: 64.7 MM HG (ref 35–45)
PCO2 BLDA: 67.2 MM HG (ref 35–45)
PCO2 BLDA: 67.7 MM HG (ref 35–45)
PH BLDA: 7.26 PH UNITS (ref 7.35–7.45)
PH BLDA: 7.27 PH UNITS (ref 7.35–7.45)
PH BLDA: 7.31 PH UNITS (ref 7.35–7.45)
PH UR STRIP.AUTO: <=5 [PH] (ref 5–8)
PLATELET # BLD AUTO: 170 10*3/MM3 (ref 140–450)
PLATELET # BLD AUTO: 195 10*3/MM3 (ref 140–450)
PMV BLD AUTO: 10.4 FL (ref 6–12)
PMV BLD AUTO: 9.7 FL (ref 6–12)
PO2 BLDA: 78.9 MM HG (ref 80–100)
PO2 BLDA: 80 MM HG (ref 80–100)
PO2 BLDA: 87.9 MM HG (ref 80–100)
POTASSIUM SERPL-SCNC: 4.8 MMOL/L (ref 3.5–5.2)
POTASSIUM SERPL-SCNC: 5.2 MMOL/L (ref 3.5–5.2)
PROT SERPL-MCNC: 6 G/DL (ref 6–8.5)
PROT UR QL STRIP: ABNORMAL
PROTHROMBIN TIME: 13.9 SECONDS (ref 11.7–14.2)
QT INTERVAL: 376 MS
QT INTERVAL: 416 MS
RBC # BLD AUTO: 4.13 10*6/MM3 (ref 4.14–5.8)
RBC # BLD AUTO: 4.49 10*6/MM3 (ref 4.14–5.8)
RBC # UR STRIP: ABNORMAL /HPF
REF LAB TEST METHOD: ABNORMAL
RHINOVIRUS RNA SPEC NAA+PROBE: NOT DETECTED
RSV RNA NPH QL NAA+NON-PROBE: NOT DETECTED
SAO2 % BLDCOA: 93.1 % (ref 92–99)
SAO2 % BLDCOA: 93.8 % (ref 92–99)
SAO2 % BLDCOA: 94.8 % (ref 92–99)
SARS-COV-2 RNA NPH QL NAA+NON-PROBE: NOT DETECTED
SET MECH RESP RATE: 20
SET MECH RESP RATE: 20
SODIUM SERPL-SCNC: 141 MMOL/L (ref 136–145)
SODIUM SERPL-SCNC: 142 MMOL/L (ref 136–145)
SP GR UR STRIP: 1.03 (ref 1–1.03)
SQUAMOUS #/AREA URNS HPF: ABNORMAL /HPF
TOTAL RATE: 20 BREATHS/MINUTE
TOTAL RATE: 22 BREATHS/MINUTE
TOTAL RATE: 22 BREATHS/MINUTE
TROPONIN T SERPL-MCNC: 0.16 NG/ML (ref 0–0.03)
TROPONIN T SERPL-MCNC: 0.21 NG/ML (ref 0–0.03)
UROBILINOGEN UR QL STRIP: ABNORMAL
VENTILATOR MODE: ABNORMAL
VENTILATOR MODE: ABNORMAL
WBC # UR STRIP: ABNORMAL /HPF
WBC NRBC COR # BLD: 8.06 10*3/MM3 (ref 3.4–10.8)
WBC NRBC COR # BLD: 8.56 10*3/MM3 (ref 3.4–10.8)

## 2022-12-06 PROCEDURE — 85007 BL SMEAR W/DIFF WBC COUNT: CPT | Performed by: EMERGENCY MEDICINE

## 2022-12-06 PROCEDURE — 94799 UNLISTED PULMONARY SVC/PX: CPT

## 2022-12-06 PROCEDURE — 81001 URINALYSIS AUTO W/SCOPE: CPT | Performed by: EMERGENCY MEDICINE

## 2022-12-06 PROCEDURE — 87040 BLOOD CULTURE FOR BACTERIA: CPT | Performed by: EMERGENCY MEDICINE

## 2022-12-06 PROCEDURE — 82803 BLOOD GASES ANY COMBINATION: CPT

## 2022-12-06 PROCEDURE — P9612 CATHETERIZE FOR URINE SPEC: HCPCS

## 2022-12-06 PROCEDURE — 72125 CT NECK SPINE W/O DYE: CPT

## 2022-12-06 PROCEDURE — 80307 DRUG TEST PRSMV CHEM ANLYZR: CPT | Performed by: EMERGENCY MEDICINE

## 2022-12-06 PROCEDURE — 83880 ASSAY OF NATRIURETIC PEPTIDE: CPT | Performed by: EMERGENCY MEDICINE

## 2022-12-06 PROCEDURE — 93010 ELECTROCARDIOGRAM REPORT: CPT | Performed by: INTERNAL MEDICINE

## 2022-12-06 PROCEDURE — 82550 ASSAY OF CK (CPK): CPT | Performed by: EMERGENCY MEDICINE

## 2022-12-06 PROCEDURE — 70450 CT HEAD/BRAIN W/O DYE: CPT

## 2022-12-06 PROCEDURE — 90471 IMMUNIZATION ADMIN: CPT | Performed by: EMERGENCY MEDICINE

## 2022-12-06 PROCEDURE — 09Q1XZZ REPAIR LEFT EXTERNAL EAR, EXTERNAL APPROACH: ICD-10-PCS | Performed by: EMERGENCY MEDICINE

## 2022-12-06 PROCEDURE — 94761 N-INVAS EAR/PLS OXIMETRY MLT: CPT

## 2022-12-06 PROCEDURE — 36600 WITHDRAWAL OF ARTERIAL BLOOD: CPT

## 2022-12-06 PROCEDURE — 87086 URINE CULTURE/COLONY COUNT: CPT | Performed by: EMERGENCY MEDICINE

## 2022-12-06 PROCEDURE — 83036 HEMOGLOBIN GLYCOSYLATED A1C: CPT | Performed by: INTERNAL MEDICINE

## 2022-12-06 PROCEDURE — 85610 PROTHROMBIN TIME: CPT | Performed by: EMERGENCY MEDICINE

## 2022-12-06 PROCEDURE — 85025 COMPLETE CBC W/AUTO DIFF WBC: CPT | Performed by: INTERNAL MEDICINE

## 2022-12-06 PROCEDURE — 87186 SC STD MICRODIL/AGAR DIL: CPT | Performed by: EMERGENCY MEDICINE

## 2022-12-06 PROCEDURE — 0202U NFCT DS 22 TRGT SARS-COV-2: CPT | Performed by: EMERGENCY MEDICINE

## 2022-12-06 PROCEDURE — 94660 CPAP INITIATION&MGMT: CPT

## 2022-12-06 PROCEDURE — 0 LIDOCAINE 1 % SOLUTION: Performed by: EMERGENCY MEDICINE

## 2022-12-06 PROCEDURE — 94664 DEMO&/EVAL PT USE INHALER: CPT

## 2022-12-06 PROCEDURE — 25010000002 CEFTRIAXONE PER 250 MG: Performed by: EMERGENCY MEDICINE

## 2022-12-06 PROCEDURE — 85025 COMPLETE CBC W/AUTO DIFF WBC: CPT | Performed by: EMERGENCY MEDICINE

## 2022-12-06 PROCEDURE — 93005 ELECTROCARDIOGRAM TRACING: CPT | Performed by: EMERGENCY MEDICINE

## 2022-12-06 PROCEDURE — 90715 TDAP VACCINE 7 YRS/> IM: CPT | Performed by: EMERGENCY MEDICINE

## 2022-12-06 PROCEDURE — 80053 COMPREHEN METABOLIC PANEL: CPT | Performed by: EMERGENCY MEDICINE

## 2022-12-06 PROCEDURE — 71045 X-RAY EXAM CHEST 1 VIEW: CPT

## 2022-12-06 PROCEDURE — 25010000002 FUROSEMIDE PER 20 MG: Performed by: EMERGENCY MEDICINE

## 2022-12-06 PROCEDURE — 94640 AIRWAY INHALATION TREATMENT: CPT

## 2022-12-06 PROCEDURE — 84484 ASSAY OF TROPONIN QUANT: CPT | Performed by: EMERGENCY MEDICINE

## 2022-12-06 PROCEDURE — 25010000002 TETANUS-DIPHTH-ACELL PERTUSSIS 5-2.5-18.5 LF-MCG/0.5 SUSPENSION PREFILLED SYRINGE: Performed by: EMERGENCY MEDICINE

## 2022-12-06 PROCEDURE — 82077 ASSAY SPEC XCP UR&BREATH IA: CPT | Performed by: EMERGENCY MEDICINE

## 2022-12-06 PROCEDURE — 82962 GLUCOSE BLOOD TEST: CPT

## 2022-12-06 PROCEDURE — 5A0935A ASSISTANCE WITH RESPIRATORY VENTILATION, LESS THAN 24 CONSECUTIVE HOURS, HIGH NASAL FLOW/VELOCITY: ICD-10-PCS | Performed by: INTERNAL MEDICINE

## 2022-12-06 PROCEDURE — 74177 CT ABD & PELVIS W/CONTRAST: CPT

## 2022-12-06 PROCEDURE — 25010000002 IOPAMIDOL 61 % SOLUTION: Performed by: EMERGENCY MEDICINE

## 2022-12-06 PROCEDURE — 99285 EMERGENCY DEPT VISIT HI MDM: CPT

## 2022-12-06 PROCEDURE — 5A09357 ASSISTANCE WITH RESPIRATORY VENTILATION, LESS THAN 24 CONSECUTIVE HOURS, CONTINUOUS POSITIVE AIRWAY PRESSURE: ICD-10-PCS | Performed by: INTERNAL MEDICINE

## 2022-12-06 PROCEDURE — 93005 ELECTROCARDIOGRAM TRACING: CPT | Performed by: INTERNAL MEDICINE

## 2022-12-06 PROCEDURE — 99222 1ST HOSP IP/OBS MODERATE 55: CPT | Performed by: INTERNAL MEDICINE

## 2022-12-06 PROCEDURE — 85730 THROMBOPLASTIN TIME PARTIAL: CPT | Performed by: EMERGENCY MEDICINE

## 2022-12-06 PROCEDURE — 83605 ASSAY OF LACTIC ACID: CPT | Performed by: EMERGENCY MEDICINE

## 2022-12-06 RX ORDER — ONDANSETRON 4 MG/1
4 TABLET, FILM COATED ORAL EVERY 6 HOURS PRN
Status: DISCONTINUED | OUTPATIENT
Start: 2022-12-06 | End: 2022-12-10

## 2022-12-06 RX ORDER — ACETAMINOPHEN 325 MG/1
650 TABLET ORAL EVERY 4 HOURS PRN
Status: DISCONTINUED | OUTPATIENT
Start: 2022-12-06 | End: 2022-12-10

## 2022-12-06 RX ORDER — MAGNESIUM SULFATE HEPTAHYDRATE 40 MG/ML
2 INJECTION, SOLUTION INTRAVENOUS AS NEEDED
Status: DISCONTINUED | OUTPATIENT
Start: 2022-12-06 | End: 2022-12-10 | Stop reason: HOSPADM

## 2022-12-06 RX ORDER — LIDOCAINE HYDROCHLORIDE 10 MG/ML
10 INJECTION, SOLUTION INFILTRATION; PERINEURAL ONCE
Status: COMPLETED | OUTPATIENT
Start: 2022-12-06 | End: 2022-12-06

## 2022-12-06 RX ORDER — FUROSEMIDE 10 MG/ML
40 INJECTION INTRAMUSCULAR; INTRAVENOUS ONCE
Status: COMPLETED | OUTPATIENT
Start: 2022-12-06 | End: 2022-12-06

## 2022-12-06 RX ORDER — DEXTROSE MONOHYDRATE 25 G/50ML
25 INJECTION, SOLUTION INTRAVENOUS
Status: DISCONTINUED | OUTPATIENT
Start: 2022-12-06 | End: 2022-12-10 | Stop reason: HOSPADM

## 2022-12-06 RX ORDER — SODIUM CHLORIDE 0.9 % (FLUSH) 0.9 %
10 SYRINGE (ML) INJECTION AS NEEDED
Status: DISCONTINUED | OUTPATIENT
Start: 2022-12-06 | End: 2022-12-10 | Stop reason: HOSPADM

## 2022-12-06 RX ORDER — ASPIRIN 81 MG/1
324 TABLET, CHEWABLE ORAL ONCE
Status: DISCONTINUED | OUTPATIENT
Start: 2022-12-06 | End: 2022-12-06

## 2022-12-06 RX ORDER — POTASSIUM CHLORIDE 1.5 G/1.77G
40 POWDER, FOR SOLUTION ORAL AS NEEDED
Status: DISCONTINUED | OUTPATIENT
Start: 2022-12-06 | End: 2022-12-10

## 2022-12-06 RX ORDER — INSULIN LISPRO 100 [IU]/ML
0-14 INJECTION, SOLUTION INTRAVENOUS; SUBCUTANEOUS EVERY 4 HOURS
Status: DISCONTINUED | OUTPATIENT
Start: 2022-12-06 | End: 2022-12-07

## 2022-12-06 RX ORDER — DICLOFENAC SODIUM AND MISOPROSTOL 75; 200 MG/1; UG/1
1 TABLET, DELAYED RELEASE ORAL 2 TIMES DAILY
COMMUNITY
End: 2022-12-10 | Stop reason: HOSPADM

## 2022-12-06 RX ORDER — IPRATROPIUM BROMIDE AND ALBUTEROL SULFATE 2.5; .5 MG/3ML; MG/3ML
3 SOLUTION RESPIRATORY (INHALATION) EVERY 4 HOURS PRN
Status: DISCONTINUED | OUTPATIENT
Start: 2022-12-06 | End: 2022-12-09

## 2022-12-06 RX ORDER — ASPIRIN 300 MG/1
300 SUPPOSITORY RECTAL ONCE
Status: COMPLETED | OUTPATIENT
Start: 2022-12-06 | End: 2022-12-06

## 2022-12-06 RX ORDER — POTASSIUM CHLORIDE 750 MG/1
40 TABLET, FILM COATED, EXTENDED RELEASE ORAL AS NEEDED
Status: DISCONTINUED | OUTPATIENT
Start: 2022-12-06 | End: 2022-12-10

## 2022-12-06 RX ORDER — DULOXETIN HYDROCHLORIDE 60 MG/1
60 CAPSULE, DELAYED RELEASE ORAL DAILY
Status: ON HOLD | COMMUNITY
End: 2022-12-10 | Stop reason: SDUPTHER

## 2022-12-06 RX ORDER — POTASSIUM CHLORIDE 7.45 MG/ML
10 INJECTION INTRAVENOUS
Status: DISCONTINUED | OUTPATIENT
Start: 2022-12-06 | End: 2022-12-10

## 2022-12-06 RX ORDER — IPRATROPIUM BROMIDE AND ALBUTEROL SULFATE 2.5; .5 MG/3ML; MG/3ML
3 SOLUTION RESPIRATORY (INHALATION)
Status: DISCONTINUED | OUTPATIENT
Start: 2022-12-06 | End: 2022-12-09

## 2022-12-06 RX ORDER — ALUMINA, MAGNESIA, AND SIMETHICONE 2400; 2400; 240 MG/30ML; MG/30ML; MG/30ML
15 SUSPENSION ORAL EVERY 6 HOURS PRN
Status: DISCONTINUED | OUTPATIENT
Start: 2022-12-06 | End: 2022-12-10

## 2022-12-06 RX ORDER — MAGNESIUM SULFATE HEPTAHYDRATE 40 MG/ML
4 INJECTION, SOLUTION INTRAVENOUS AS NEEDED
Status: DISCONTINUED | OUTPATIENT
Start: 2022-12-06 | End: 2022-12-10

## 2022-12-06 RX ORDER — CALCIUM GLUCONATE 20 MG/ML
1 INJECTION, SOLUTION INTRAVENOUS AS NEEDED
Status: DISCONTINUED | OUTPATIENT
Start: 2022-12-06 | End: 2022-12-10 | Stop reason: HOSPADM

## 2022-12-06 RX ORDER — MAGNESIUM SULFATE HEPTAHYDRATE 40 MG/ML
2 INJECTION, SOLUTION INTRAVENOUS AS NEEDED
Status: DISCONTINUED | OUTPATIENT
Start: 2022-12-06 | End: 2022-12-10

## 2022-12-06 RX ORDER — ONDANSETRON 2 MG/ML
4 INJECTION INTRAMUSCULAR; INTRAVENOUS EVERY 6 HOURS PRN
Status: DISCONTINUED | OUTPATIENT
Start: 2022-12-06 | End: 2022-12-10

## 2022-12-06 RX ORDER — ACETAMINOPHEN 650 MG/1
650 SUPPOSITORY RECTAL EVERY 4 HOURS PRN
Status: DISCONTINUED | OUTPATIENT
Start: 2022-12-06 | End: 2022-12-10

## 2022-12-06 RX ORDER — NICOTINE POLACRILEX 4 MG
15 LOZENGE BUCCAL
Status: DISCONTINUED | OUTPATIENT
Start: 2022-12-06 | End: 2022-12-10 | Stop reason: HOSPADM

## 2022-12-06 RX ADMIN — TETANUS TOXOID, REDUCED DIPHTHERIA TOXOID AND ACELLULAR PERTUSSIS VACCINE, ADSORBED 0.5 ML: 5; 2.5; 8; 8; 2.5 SUSPENSION INTRAMUSCULAR at 05:08

## 2022-12-06 RX ADMIN — IOPAMIDOL 85 ML: 612 INJECTION, SOLUTION INTRAVENOUS at 07:04

## 2022-12-06 RX ADMIN — IPRATROPIUM BROMIDE AND ALBUTEROL SULFATE 3 ML: 2.5; .5 SOLUTION RESPIRATORY (INHALATION) at 16:35

## 2022-12-06 RX ADMIN — LIDOCAINE HYDROCHLORIDE 10 ML: 10 INJECTION, SOLUTION INFILTRATION; PERINEURAL at 05:21

## 2022-12-06 RX ADMIN — IPRATROPIUM BROMIDE AND ALBUTEROL SULFATE 3 ML: 2.5; .5 SOLUTION RESPIRATORY (INHALATION) at 11:01

## 2022-12-06 RX ADMIN — CEFTRIAXONE 2 G: 2 INJECTION, POWDER, FOR SOLUTION INTRAMUSCULAR; INTRAVENOUS at 07:44

## 2022-12-06 RX ADMIN — IPRATROPIUM BROMIDE AND ALBUTEROL SULFATE 3 ML: 2.5; .5 SOLUTION RESPIRATORY (INHALATION) at 20:21

## 2022-12-06 RX ADMIN — Medication 3 ML: at 05:21

## 2022-12-06 RX ADMIN — ASPIRIN 300 MG: 300 SUPPOSITORY RECTAL at 10:25

## 2022-12-06 RX ADMIN — FUROSEMIDE 40 MG: 10 INJECTION, SOLUTION INTRAMUSCULAR; INTRAVENOUS at 07:39

## 2022-12-06 NOTE — ED PROVIDER NOTES
Laceration Repair    Date/Time: 12/6/2022 5:30 AM  Performed by: Darrick Clement PA  Authorized by: Toñito Pena MD     Consent:     Consent obtained:  Verbal    Consent given by:  Patient    Risks discussed:  Pain, need for additional repair, poor cosmetic result and poor wound healing  Universal protocol:     Procedure explained and questions answered to patient or proxy's satisfaction: yes      Patient identity confirmed:  Verbally with patient  Anesthesia:     Anesthesia method:  Local infiltration    Local anesthetic:  Lidocaine 1% w/o epi  Laceration details:     Location:  Ear    Ear location:  L ear    Length (cm):  5  Pre-procedure details:     Preparation:  Patient was prepped and draped in usual sterile fashion  Exploration:     Hemostasis achieved with:  Direct pressure    Wound extent: no fascia violation noted, no foreign bodies/material noted, no tendon damage noted and no underlying fracture noted    Treatment:     Area cleansed with:  Povidone-iodine    Amount of cleaning:  Extensive    Irrigation solution:  Sterile saline    Debridement:  None    Undermining:  None  Skin repair:     Repair method:  Sutures    Suture size:  5-0    Suture material:  Chromic gut    Suture technique:  Simple interrupted    Number of sutures:  12  Approximation:     Approximation:  Close  Repair type:     Repair type:  Simple  Post-procedure details:     Dressing:  Antibiotic ointment    Procedure completion:  Tolerated well, no immediate complications           Darrick Clement PA  12/06/22 0622

## 2022-12-06 NOTE — CONSULTS
"Date of Hospital Visit: 22  Encounter Provider: Vikram So MD  Place of Service: [facility] CARDIOLOGY  Patient Name: Russell Christine  :1950  Referral Provider: Rob Mallory MD    Chief complaint: fall     History of Present Illness    Mr. Christine is a 71yo with HTN, hyperlipidemia, and MORRIS on CPAP who presents with confusion and weakness and a possible fall.     His wife says that he was doing well until yesterday. He had a cough and he told her that he felt like he had \"the flu.\" He went to bed early and at 3am, she heard him calling out. He had either gotten up and fallen or had fell out of bed. He hit his head on the nightstand. She called EMS and he reported that his left side hurt and his head hurt.     Upon arrival in the ED, he's been more obtunded. He's now on BIPAP. His pH is 7.2, PCO2 67, PO2 88, Cr 1.15->1.39, Tn 0.159-0.208, proBNP 2500. His RVP is negative. His UA is abnormal. His CXR shows very mild vascular congestion.      He was given a dose of rectal aspirin 300mg, ceftraxione, and furosemide 40mg IV x 1.    Past Medical History:   Diagnosis Date   • Arthritis    • BPH (benign prostatic hyperplasia)    • COPD (chronic obstructive pulmonary disease) ([facility])    • Depression    • Dizziness     SINCE CRANIOTOMY YRS AGO     • GERD (gastroesophageal reflux disease)    • Hearing loss    • Hyperlipemia    • Hypertension    • Left hip pain    • Legal blindness     LEFT EYE   • Personal history of benign neoplasm of the brain    • Psoriasis     ARMS, SCALP AND BACK   • Seizure (HCC) 2017    DUE TO CRANIIOTOMY   • Shoulder pain, right    • Sleep apnea     CPAP   • Vertigo        Past Surgical History:   Procedure Laterality Date   • CERVICAL FUSION     • COLONOSCOPY     • CRANIOTOMY      BENIGN TUMOR BEHIND LEFT EYE     • LUMBAR FUSION      X 2   • NV RECONSTR TOTAL SHOULDER IMPLANT Right 10/12/2016    Procedure: RT TOTAL SHOULDER REVERSE  " ARTHROPLASTY;  Surgeon: Gilberto Pickard MD;  [place] [facility] [place] [facility] [place]  Service: [place]opedics       Prior to Admission medications    Medication Sig Start Date End Date Taking? Authorizing Provider   albuterol sulfate  (90 Base) MCG/ACT inhaler Inhale 2 puffs Every 4 (Four) Hours As Needed for Wheezing or Shortness of Air. 12/6/19  Yes Rey Garner MD   cyclobenzaprine (FLEXERIL) 10 MG tablet Take 1 tablet by mouth 3 (Three) Times a Day.  Patient taking differently: Take 10 mg by mouth 3 (Three) Times a Day As Needed. 6/14/19  Yes Rob Jacome MD   diclofenac-miSOPROStol (ARTHROTEC 75) 75-0.2 MG EC tablet Take 1 tablet by mouth 2 (Two) Times a Day.   Yes Raul Lee MD   DULoxetine (CYMBALTA) 60 MG capsule Take 60 mg by mouth Daily.   Yes Raul Lee MD   finasteride (PROSCAR) 5 MG tablet Take 5 mg by mouth Every Morning.   Yes Raul Lee MD   gabapentin (NEURONTIN) 600 MG tablet Take 600 mg by mouth 3 (Three) Times a Day.   Yes Raul Lee MD   lisinopril (PRINIVIL,ZESTRIL) 10 MG tablet Take 5 mg by mouth Every Morning.   Yes Raul Lee MD   omeprazole (PriLOSEC) 40 MG capsule Take 20 mg by mouth 2 (Two) Times a Day.   Yes Raul Lee MD   rosuvastatin (CRESTOR) 20 MG tablet Take 40 mg by mouth Every Night.   Yes Raul Lee MD   traMADol (ULTRAM) 50 MG tablet Take 50 mg by mouth Every 6 (Six) Hours As Needed for Moderate Pain .   Yes Raul Lee MD   traZODone (DESYREL) 100 MG tablet Take 200 mg by mouth Every Night.   Yes Raul Lee MD   VITAMIN D PO Take 1 capsule by mouth Every Morning.   Yes Raul Lee MD   Chlorhexidine Gluconate 2 % pads Apply  topically. As directed pre op    Raul Lee MD   mirtazapine (REMERON SOL-TAB) 45 MG disintegrating tablet Take 45 mg by mouth Every Night.    Raul Lee MD   mupirocin (BACTROBAN) 2 % nasal ointment into the nostril(s) as directed by  "provider. As directed pre op    ProviderRaul MD   vitamin B-12 (CYANOCOBALAMIN) 1000 MCG tablet Take 1,000 mcg by mouth Every Morning.    Provider, MD Raul       Social History     Socioeconomic History   • Marital status:    Tobacco Use   • Smoking status: Every Day     Packs/day: 1.00     Years: 48.00     Pack years: 48.00     Types: Cigarettes   • Smokeless tobacco: Never   Vaping Use   • Vaping Use: Never used   Substance and Sexual Activity   • Alcohol use: Yes     Comment: SOCIALLY   • Drug use: No   • Sexual activity: Defer       Family History   Problem Relation Age of Onset   • Heart attack Father    • Malig Hyperthermia Neg Hx        Review of Systems   Unable to perform ROS: Patient unresponsive        Objective:     Vitals:    12/06/22 0919 12/06/22 0931 12/06/22 0932 12/06/22 1001   BP:  127/85  102/70   Pulse:  82 81 71   Resp:       Temp: 98 °F (36.7 °C)      TempSrc: Tympanic      SpO2:   93% 95%   Weight:       Height:         Body mass index is 33.3 kg/m².    Last Weight and Admission Weight        12/06/22  0430   Weight: 99.3 kg (219 lb)     Flowsheet Rows    Flowsheet Row First Filed Value   Admission Height 172.7 cm (68\") Documented at 12/06/2022 0430   Admission Weight 99.3 kg (219 lb) Documented at 12/06/2022 0430            Intake/Output Summary (Last 24 hours) at 12/6/2022 1229  Last data filed at 12/6/2022 0826  Gross per 24 hour   Intake 100 ml   Output 275 ml   Net -175 ml         Physical Exam  Constitutional:       Comments: On bipap, does not arouse, no distress   HENT:      Head: Normocephalic.      Mouth/Throat:      Comments: BIPAP mask in place  Eyes:      Comments: Eyes closed   Cardiovascular:      Rate and Rhythm: Normal rate. Occasional extrasystoles are present.     Heart sounds: Heart sounds are distant.      Comments: Trace edema  Pulmonary:      Breath sounds: Wheezing present.   Abdominal:      Palpations: Abdomen is soft.   Skin:     Coloration: " Skin is pale.                 Lab Review:                Results from last 7 days   Lab Units 12/06/22  0918   SODIUM mmol/L 141   POTASSIUM mmol/L 5.2   CHLORIDE mmol/L 104   CO2 mmol/L 29.3*   BUN mg/dL 22   CREATININE mg/dL 1.39*   GLUCOSE mg/dL 117*   CALCIUM mg/dL 8.8     Results from last 7 days   Lab Units 12/06/22  0918 12/06/22  0439   CK TOTAL U/L  --  116   TROPONIN T ng/mL 0.208* 0.159*     Results from last 7 days   Lab Units 12/06/22  0918   WBC 10*3/mm3 8.06   HEMOGLOBIN g/dL 12.7*   HEMATOCRIT % 38.8   PLATELETS 10*3/mm3 195     Results from last 7 days   Lab Units 12/06/22  0528   INR  1.06   APTT seconds 32.8                     I personally viewed and interpreted the patient's EKG/Telemetry data -- sinus or ectopic atrial rhythm, no ischemic findings    Assessment/Plan:     1. Fall at home  2. Metabolic encephalopathy  3. Acute hypercapneic respiratory failure  4. COPD  5. UTI  6. Elevated Tn/proBNP    *I suspect that his Tn elevation is not the primary contributor to his presentation. His EKG is nonischemic. Given his fall and the fact that we're already seeing his Hgb go down a bit, I would not anticoagulate him. He did receive aspirin appropriately. I would not give a BB given low BP and active wheezing.   *Agree with one dose of furosemide, will assess response  *Check echo  *We will follow

## 2022-12-06 NOTE — H&P
[place]  HISTORY AND PHYSICAL   [facility]        Patient Identification:  Name: Russell Christine  Age: 72 y.o.  Sex: male  :  1950  MRN: [id number]                     Primary Care Physician: Vicente Mathews MD    Chief Complaint: Status post fall    History of Present Illness:     72-year-old male status post fall with head injury.  Found by family on the floor at 3:00 this morning.  Last known normal 6 PM last night before going to bed.  Family noted he has had increased cough beyond baseline but no other new complaints.  Unsure how or why he fell.  CT head shows no acute.    Past Medical History:  Past Medical History:   Diagnosis Date   • Arthritis    • BPH (benign prostatic hyperplasia)    • COPD (chronic obstructive pulmonary disease) ([facility])    • Depression    • Dizziness     SINCE CRANIOTOMY YRS AGO     • GERD (gastroesophageal reflux disease)    • Hearing loss    • Hyperlipemia    • Hypertension    • Left hip pain    • Legal blindness     LEFT EYE   • Personal history of benign neoplasm of the brain    • Psoriasis     ARMS, SCALP AND BACK   • Seizure ([facility]) 2017    DUE TO CRANIIOTOMY   • Shoulder pain, right    • Sleep apnea     CPAP   • Vertigo      Past Surgical History:  Past Surgical History:   Procedure Laterality Date   • CERVICAL FUSION     • COLONOSCOPY     • CRANIOTOMY      BENIGN TUMOR BEHIND LEFT EYE     • LUMBAR FUSION      X 2   • RI RECONSTR TOTAL SHOULDER IMPLANT Right 10/12/2016    Procedure: RT TOTAL SHOULDER REVERSE  ARTHROPLASTY;  Surgeon: Gilberto Pickard MD;  Location: [facility] [place];  Service: Orthopedics      Home Meds:  (Not in a hospital admission)      Allergies:  Allergies   Allergen Reactions   • Sulfa Antibiotics Swelling     Immunizations:  Immunization History   Administered Date(s) Administered   • COVID-19 (MODERNA) 1st, 2nd, 3rd Dose Only 2021, 2021   • COVID-19 (MODERNA) BOOSTER 2021   •  "FluLaval/Fluzone >6mos 2019   • Tdap 2022     Social History:   Social History     Social History Narrative   • Not on file     Social History     Tobacco Use   • Smoking status: Every Day     Packs/day: 1.00     Years: 48.00     Pack years: 48.00     Types: Cigarettes   • Smokeless tobacco: Never   Substance Use Topics   • Alcohol use: Yes     Comment: SOCIALLY     Family History:  Family History   Problem Relation Age of Onset   • Heart attack Father    • Malig Hyperthermia Neg Hx         Review of Systems  Unobtainable due to altered mental status    Objective:  tMax 24 hrs: Temp (24hrs), Av.6 °F (37 °C), Min:98.6 °F (37 °C), Max:98.6 °F (37 °C)    Vitals Ranges:   Temp:  [98.6 °F (37 °C)] 98.6 °F (37 °C)  Heart Rate:  [] 76  Resp:  [15-16] 15  BP: (103-137)/(70-87) 103/70      Exam:  /70   Pulse 76   Temp 98.6 °F (37 °C) (Tympanic)   Resp 15   Ht 172.7 cm (68\")   Wt 99.3 kg (219 lb)   SpO2 92%   BMI 33.30 kg/m²     GENERAL APPEARANCE:   · Well developed  · Well nourished  · Chronically ill-appearing  EYES:    · PERRL                                                                           · Conjunctiva normal  · Sclera non-icteric.  HENT:   · Atraumatic, normocephalic  · External ears and nose normal  · Moist mucous membranes and no ulcers  NECK:  · Thyroid not enlarged  · Trachea midline   RESPIRATORY:    · Nonlabored breathing   · Normal breath sounds  · No rales. No wheezing  · No dullness  CARDIOVASCULAR:    · RRR  · Normal S1, S2  · No murmur  · Lower extremity edema: none    · Peripheral pulses present.    GI:   · Bowel sounds normal  · Abdomen soft , non-distended, non-tender  · No abdominal masses.    MUSCULOSKELETAL:  · Normal movement of extremities  · No tenderness, no deformities  · No clubbing or cyanosis   Skin:    · No visible rashes  · No palpable nodules.  Cap refill normal.  No mottling.   PSYCHIATRIC:  · Speech and behavior appropriate  · Normal mood and " affect  · Oriented to person, place and time  NEUROLOGIC:   Cranial nerves II through XII grossly intact.  Sensation intact.  .     Data Review:  Labs reviewed  Results    Collected Updated Procedure Result Status    12/06/2022 0739 12/06/2022 0753 Blood Culture - Blood, Arm, Left [[id number]]   Blood from Arm, Left    In process Component Value   No component results             12/06/2022 0737 12/06/2022 0821 Lactic Acid, Plasma [[id number]]   Blood from Arm, Right    Final result Component Value Units   Lactate 0.7 mmol/L          12/06/2022 0737 12/06/2022 0754 Blood Culture - Blood, Arm, Right [[id number]]   Blood from Arm, Right    In process Component Value   No component results             12/06/2022 0528 12/06/2022 0604 Protime-INR [[id number]]   Blood from Arm, Left    Final result Component Value Units   Protime 13.9 Seconds   INR 1.06           12/06/2022 0528 12/06/2022 0604 aPTT [[id number]]   Blood from Arm, Left    Final result Component Value Units   PTT 32.8 seconds          12/06/2022 0506 12/06/2022 0510 Blood Gas, Arterial - [[id number]]   (Abnormal)   Arterial Blood    Final result Component Value Units   Site Arterial: right brachial    Eugenio's Test N/A    pH, Arterial 7.266 Low Critical   pH units   pCO2, Arterial 67.7 High Critical   mm Hg   pO2, Arterial 87.9 mm Hg   HCO3, Arterial 30.8 High  mmol/L   Base Excess, Arterial 1.6 mmol/L   O2 Saturation Calculated 94.8 %   Barometric Pressure for Blood Gas 752.7 mmHg   Modality Cannula    Flow Rate 3.5 lpm   Rate 20 Breaths/minute          12/06/2022 0456 12/06/2022 0546 Urinalysis With Culture If Indicated - Straight Cath [[id number]]    (Abnormal)   Urine from Straight Cath    Final result Component Value   Color, UA Dark Yellow Abnormal    Appearance, UA Turbid Abnormal    pH, UA <=5.0   Specific Gravity, UA 1.028   Glucose, UA Negative   Ketones, UA Trace Abnormal    Bilirubin, UA Negative   Blood, UA Trace Abnormal    Protein, UA 30 mg/dL  (1+) Abnormal    Leuk Esterase, UA Moderate (2+) Abnormal    Nitrite, UA Positive Abnormal    Urobilinogen, UA 2.0 E.U./dL Abnormal           12/06/2022 0456 12/06/2022 0600 Urine Drug Screen - Straight Cath [[id number]]    (Abnormal)   Urine from Straight Cath    Final result Component Value   Amphet/Methamphet, Screen Positive Abnormal    Barbiturates Screen, Urine Negative   Benzodiazepine Screen, Urine Negative   Cocaine Screen, Urine Negative   Opiate Screen Negative   THC, Screen, Urine Negative   Methadone Screen, Urine Negative   Oxycodone Screen, Urine Negative          12/06/2022 0456 12/06/2022 0744 Urinalysis, Microscopic Only - Straight Cath [[id number]]   (Abnormal)   Urine from Straight Cath    Final result Component Value Units   RBC, UA 0-2 /HPF   WBC, UA 31-50 Abnormal  /HPF   Bacteria, UA 4+ Abnormal  /HPF   Squamous Epithelial Cells, UA 3-6 Abnormal  /HPF   Hyaline Casts, UA 0-2 /LPF   Granular Casts, UA 13-20 /LPF   Methodology Manual Light Microscopy           12/06/2022 0456 12/06/2022 0744 Urine Culture - Urine, Straight Cath [[id number]]   Urine from Straight Cath    In process Component Value   No component results             12/06/2022 0441 12/06/2022 0539 Respiratory Panel PCR w/COVID-19(SARS-CoV-2) KIMBERLY/SALVATORE/WILLIAM/PAD/COR/MAD/PETE In-House, NP Swab in UTM/VTM, 3-4 HR TAT - Swab, Nasopharynx [[id number]]    Swab from Nasopharynx    Final result Component Value   ADENOVIRUS, PCR Not Detected   Coronavirus 229E Not Detected   Coronavirus HKU1 Not Detected   Coronavirus NL63 Not Detected   Coronavirus OC43 Not Detected   COVID19 Not Detected   Human Metapneumovirus Not Detected   Human Rhinovirus/Enterovirus Not Detected   Influenza A PCR Not Detected   Influenza B PCR Not Detected   Parainfluenza Virus 1 Not Detected   Parainfluenza Virus 2 Not Detected   Parainfluenza Virus 3 Not Detected   Parainfluenza Virus 4 Not Detected   RSV, PCR Not Detected   Bordetella pertussis pcr Not Detected    Bordetella parapertussis PCR Not Detected   Chlamydophila pneumoniae PCR Not Detected   Mycoplasma pneumo by PCR Not Detected             12/06/2022 0439 12/06/2022 0527 Comprehensive Metabolic Panel [[id number]]    (Abnormal)   Blood    Final result Component Value Units   Glucose 130 High  mg/dL   BUN 19 mg/dL   Creatinine 1.15 mg/dL   Sodium 142 mmol/L   Potassium 4.8  mmol/L   Chloride 106 mmol/L   CO2 26.2 mmol/L   Calcium 8.6 mg/dL   Total Protein 6.0 g/dL   Albumin 3.20 Low  g/dL   ALT (SGPT) 29 U/L   AST (SGOT) 33  U/L   Alkaline Phosphatase 74 U/L   Total Bilirubin 0.4 mg/dL   Globulin 2.8 gm/dL   A/G Ratio 1.1 g/dL   BUN/Creatinine Ratio 16.5    Anion Gap 9.8 mmol/L   eGFR 67.6  mL/min/1.73          12/06/2022 0439 12/06/2022 0527 Troponin [[id number]]    (Abnormal)   Blood    Final result Component Value Units   Troponin T 0.159 High Critical  ng/mL          12/06/2022 0439 12/06/2022 0534 BNP [[id number]]    (Abnormal)   Blood    Final result Component Value Units   proBNP 2,489.0 High  pg/mL          12/06/2022 0439 12/06/2022 0518 CK [[id number]]   Blood    Final result Component Value Units   Creatine Kinase 116 U/L          12/06/2022 0439 12/06/2022 0518 Ethanol [[id number]]   Blood    Final result Component Value Units   Ethanol <10 mg/dL   Ethanol % <0.010 %          12/06/2022 0439 12/06/2022 0653 CBC Auto Differential [[id number]]   (Abnormal)   Blood    Final result Component Value Units   WBC 8.56 10*3/mm3   RBC 4.49 10*6/mm3   Hemoglobin 13.8 g/dL   Hematocrit 43.0 %   MCV 95.8 fL   MCH 30.7 pg   MCHC 32.1 g/dL   RDW 12.1 Low  %   RDW-SD 41.9 fl   MPV 10.4 fL   Platelets 170 10*3/mm3   Neutrophil % 81.8 High  %   Lymphocyte % 7.5 Low  %   Monocyte % 9.2 %   Eosinophil % 0.2 Low  %   Basophil % 0.6 %   Neutrophils, Absolute 7.00 10*3/mm3   Lymphocytes, Absolute 0.64 Low  10*3/mm3   Monocytes, Absolute 0.79 10*3/mm3   Eosinophils, Absolute 0.02 10*3/mm3   Basophils, Absolute 0.05 10*3/mm3           12/06/2022 0439 12/06/2022 0653 Scan Slide [[id number]]   Blood    Final result Component Value   No component results          12/06/2022 0439 12/06/2022 0909 Hemoglobin A1c [[id number]]   Blood    In process Component Value   No component results                Imaging reviewed  Chest x-ray 12/6 reviewed: Mild pulmonary vascular congestion    CT abdomen pelvis 12/6 reviewed: No acute    CT cervical spine 12/6 reviewed: No acute    CT head 12/6 reviewed: No acute            Microbiology reviewed            Assessment:  Fall at home with head injury  Acute hypoxemia  Acute hypercapnic respiratory failure  COPD with exacerbation  Pulmonary vascular congestion  UTI  Type II NSTEMI  Sleep apnea on CPAP          Plan:    Admit to intensive care unit.  Neurochecks per protocol.  Suspect his etiology of the fall was likely related to UTI versus hypercapnia.  Patient is also on several medications that could potentially cause sedation at home that could have contributed to fall.  Patient n.p.o. and holding all home medicines for now.  Continue NIPPV and repeat ABG in 2 hours.  Scheduled and as needed bronchodilators for COPD.  Think we can hold off on adding steroids at this time.  Antibiotic for UTI.  Repeat serial troponin.  We will give 1 dose of Lasix for pulmonary edema.  Control blood pressure.  Control glucose.  Mechanical DVT prophylaxis for now until clarified no acute issues with fall that would put him at risk for bleeding.        Rob Mallory MD  [facility], [facility]  Pulmonary and Critical Care Medicine    12/6/2022  09:12 EST

## 2022-12-06 NOTE — ED NOTES
Pt ear lac was cleaned with surgical scrub and water   Full Thickness Lip Wedge Repair (Flap) Text: Given the location of the defect and the proximity to free margins a full thickness wedge repair was deemed most appropriate.  Using a sterile surgical marker, the appropriate repair was drawn incorporating the defect and placing the expected incisions perpendicular to the vermilion border.  The vermilion border was also meticulously outlined to ensure appropriate reapproximation during the repair.  The area thus outlined was incised through and through with a #15 scalpel blade.  The muscularis and dermis were reaproximated with deep sutures following hemostasis. Care was taken to realign the vermilion border before proceeding with the superficial closure.  Once the vermilion was realigned the superfical and mucosal closure was finished.

## 2022-12-06 NOTE — ED PROVIDER NOTES
EMERGENCY DEPARTMENT ENCOUNTER    Room Number:  16/16  Date of encounter:  12/6/2022  PCP: Vicente Mathews MD  Historian: Pt    Patient was placed in face mask during triage process. Patient was wearing facemask when I entered the room and throughout our encounter. I wore full protective equipment throughout this patient encounter including a face mask, eye protection, and gloves. Hand hygiene was performed before donning protective equipment and again following doffing of PPE after leaving the room.    HPI:  Chief Complaint: Fall with head injury//Hypoxemia//AMS  A complete HPI/ROS/PMH/PSH/SH/FH are unobtainable due to: limited historian  Context: Russell Christine is a 72 y.o. male who, at baseline, is alert and oriented x3 and does not require supplemental oxygen presents to the ED via EMS after he was found on the floor by family member at 3:00 this morning.  Last known normal was at 6:00 when he went to bed.  He has had some increased cough above beyond baseline with no other complaints.  Patient unsure how or why he fell.  He is able to identify where he is and that he hit his head.  He reports some abdominal discomfort but no significant chest pain or shortness of breath.  Unknown date of last tetanus immunization.  No clear exacerbating or relieving factors otherwise noted.  Denies significant headache at this time.      MEDICAL HISTORY REVIEW  EMR reviewed:    Admit date: 12/5/2019  Discharge date and time:12/6/2019  Discharged Condition: good  Discharge Diagnoses:        Active Hospital Problems     Diagnosis   POA   • **COPD exacerbation (CMS/[facility]) [J44.1]   Yes   • Hypoxia [R09.02]   Unknown   • Viral upper respiratory tract infection [J06.9]   Unknown   • Sleep apnea [G47.30]   Unknown   • Seizure (CMS/[facility]) [R56.9]   Unknown   • Hypertension [I10]   Unknown   • Hyperlipemia [E78.5]   Unknown   • GERD (gastroesophageal reflux disease) [K21.9]   Unknown   • BPH (benign prostatic hyperplasia) [N40.0]            PAST MEDICAL HISTORY  Active Ambulatory Problems     Diagnosis Date Noted   • S/p reverse total shoulder arthroplasty 10/12/2016   • COPD exacerbation ([facility]) 12/05/2019   • Hypoxia 12/05/2019   • Viral upper respiratory tract infection 12/05/2019   • Sleep apnea 12/05/2019   • Seizure ([facility]) 12/05/2019   • Hypertension 12/05/2019   • Hyperlipemia 12/05/2019   • GERD (gastroesophageal reflux disease) 12/05/2019   • BPH (benign prostatic hyperplasia) 12/05/2019     Resolved Ambulatory Problems     Diagnosis Date Noted   • No Resolved Ambulatory Problems     Past Medical History:   Diagnosis Date   • Arthritis    • COPD (chronic obstructive pulmonary disease) ([facility])    • Depression    • Dizziness    • Hearing loss    • Left hip pain    • Legal blindness    • Personal history of benign neoplasm of the brain    • Psoriasis    • Shoulder pain, right    • Vertigo          PAST SURGICAL HISTORY  Past Surgical History:   Procedure Laterality Date   • CERVICAL FUSION     • COLONOSCOPY     • CRANIOTOMY      BENIGN TUMOR BEHIND LEFT EYE  1976   • LUMBAR FUSION      X 2   • AK RECONSTR TOTAL SHOULDER IMPLANT Right 10/12/2016    Procedure: RT TOTAL SHOULDER REVERSE  ARTHROPLASTY;  Surgeon: Gilberto Pickard MD;  Location: [facility];  Service: Orthopedics         FAMILY HISTORY  Family History   Problem Relation Age of Onset   • Heart attack Father    • Malig Hyperthermia Neg Hx          SOCIAL HISTORY  Social History     Socioeconomic History   • Marital status:    Tobacco Use   • Smoking status: Every Day     Packs/day: 1.00     Years: 48.00     Pack years: 48.00     Types: Cigarettes   • Smokeless tobacco: Never   Vaping Use   • Vaping Use: Never used   Substance and Sexual Activity   • Alcohol use: Yes     Comment: SOCIALLY   • Drug use: No   • Sexual activity: Defer         ALLERGIES  Sulfa antibiotics        REVIEW OF SYSTEMS  Review of Systems     All systems reviewed and negative except for those discussed  in HPI.       PHYSICAL EXAM    I have reviewed the triage vital signs and nursing notes.    ED Triage Vitals [12/06/22 0400]   Temp Heart Rate Resp BP SpO2   98.6 °F (37 °C) 100 16 111/72 100 %      Temp src Heart Rate Source Patient Position BP Location FiO2 (%)   Tympanic Monitor -- -- --       Physical Exam    Physical Exam   Constitutional: Slightly somnolent but wakes with verbal and tactile stimuli.  Rather drowsy, however.  HENT:  Head: Normocephalic.  Laceration upper left ear.  Hemostatic.  No other evidence of skull fracture identified.  Oropharynx: Mucous membranes are moist.   Eyes: No scleral icterus. No conjunctival pallor.  EOMI, PERRL  Neck: Painless range of motion noted. Neck supple.  No midline tenderness to palpation or step-off  Cardiovascular: Normal rate, regular rhythm and intact distal pulses.  Pulmonary/Chest: No respiratory distress. There are no wheezes, no rhonchi, and no rales.   Abdominal: Soft. There is mild diffuse discomfort with palpation without focal tenderness. There is no rebound and no guarding.  There are some marks on his abdomen consistent with laying on the floor.  Musculoskeletal: Moves all extremities equally.  No bony deformity.  Ecchymosis left shoulder.    Neurological: Very drowsy but does wake and answer his name as well as his location.  Follows simple commands in all 4 extremities.  Skin: Skin is pink, warm, and dry. No pallor.   Psychiatric: Limited evaluation  Nursing note and vitals reviewed.    LAB RESULTS  Recent Results (from the past 24 hour(s))   ECG 12 Lead Altered Mental Status    Collection Time: 12/06/22  4:22 AM   Result Value Ref Range    QT Interval 376 ms   Comprehensive Metabolic Panel    Collection Time: 12/06/22  4:39 AM    Specimen: Blood   Result Value Ref Range    Glucose 130 (H) 65 - 99 mg/dL    BUN 19 8 - 23 mg/dL    Creatinine 1.15 0.76 - 1.27 mg/dL    Sodium 142 136 - 145 mmol/L    Potassium 4.8 3.5 - 5.2 mmol/L    Chloride 106 98 - 107  mmol/L    CO2 26.2 22.0 - 29.0 mmol/L    Calcium 8.6 8.6 - 10.5 mg/dL    Total Protein 6.0 6.0 - 8.5 g/dL    Albumin 3.20 (L) 3.50 - 5.20 g/dL    ALT (SGPT) 29 1 - 41 U/L    AST (SGOT) 33 1 - 40 U/L    Alkaline Phosphatase 74 39 - 117 U/L    Total Bilirubin 0.4 0.0 - 1.2 mg/dL    Globulin 2.8 gm/dL    A/G Ratio 1.1 g/dL    BUN/Creatinine Ratio 16.5 7.0 - 25.0    Anion Gap 9.8 5.0 - 15.0 mmol/L    eGFR 67.6 >60.0 mL/min/1.73   Troponin    Collection Time: 12/06/22  4:39 AM    Specimen: Blood   Result Value Ref Range    Troponin T 0.159 (C) 0.000 - 0.030 ng/mL   BNP    Collection Time: 12/06/22  4:39 AM    Specimen: Blood   Result Value Ref Range    proBNP 2,489.0 (H) 0.0 - 900.0 pg/mL   CK    Collection Time: 12/06/22  4:39 AM    Specimen: Blood   Result Value Ref Range    Creatine Kinase 116 20 - 200 U/L   Ethanol    Collection Time: 12/06/22  4:39 AM    Specimen: Blood   Result Value Ref Range    Ethanol <10 0 - 10 mg/dL    Ethanol % <0.010 %   CBC Auto Differential    Collection Time: 12/06/22  4:39 AM    Specimen: Blood   Result Value Ref Range    WBC 8.56 3.40 - 10.80 10*3/mm3    RBC 4.49 4.14 - 5.80 10*6/mm3    Hemoglobin 13.8 13.0 - 17.7 g/dL    Hematocrit 43.0 37.5 - 51.0 %    MCV 95.8 79.0 - 97.0 fL    MCH 30.7 26.6 - 33.0 pg    MCHC 32.1 31.5 - 35.7 g/dL    RDW 12.1 (L) 12.3 - 15.4 %    RDW-SD 41.9 37.0 - 54.0 fl    MPV 10.4 6.0 - 12.0 fL    Platelets 170 140 - 450 10*3/mm3    Neutrophil % 81.8 (H) 42.7 - 76.0 %    Lymphocyte % 7.5 (L) 19.6 - 45.3 %    Monocyte % 9.2 5.0 - 12.0 %    Eosinophil % 0.2 (L) 0.3 - 6.2 %    Basophil % 0.6 0.0 - 1.5 %    Neutrophils, Absolute 7.00 1.70 - 7.00 10*3/mm3    Lymphocytes, Absolute 0.64 (L) 0.70 - 3.10 10*3/mm3    Monocytes, Absolute 0.79 0.10 - 0.90 10*3/mm3    Eosinophils, Absolute 0.02 0.00 - 0.40 10*3/mm3    Basophils, Absolute 0.05 0.00 - 0.20 10*3/mm3   Respiratory Panel PCR w/COVID-19(SARS-CoV-2) KIMBERLY/SALVATORE/WILLIAM/PAD/COR/MAD/PETE In-House, NP Swab in [facility], 3-4 HR  TAT - Swab, Nasopharynx    Collection Time: 12/06/22  4:41 AM    Specimen: Nasopharynx; Swab   Result Value Ref Range    ADENOVIRUS, PCR Not Detected Not Detected    Coronavirus 229E Not Detected Not Detected    Coronavirus HKU1 Not Detected Not Detected    Coronavirus NL63 Not Detected Not Detected    Coronavirus OC43 Not Detected Not Detected    COVID19 Not Detected Not Detected - Ref. Range    Human Metapneumovirus Not Detected Not Detected    Human Rhinovirus/Enterovirus Not Detected Not Detected    Influenza A PCR Not Detected Not Detected    Influenza B PCR Not Detected Not Detected    Parainfluenza Virus 1 Not Detected Not Detected    Parainfluenza Virus 2 Not Detected Not Detected    Parainfluenza Virus 3 Not Detected Not Detected    Parainfluenza Virus 4 Not Detected Not Detected    RSV, PCR Not Detected Not Detected    Bordetella pertussis pcr Not Detected Not Detected    Bordetella parapertussis PCR Not Detected Not Detected    Chlamydophila pneumoniae PCR Not Detected Not Detected    Mycoplasma pneumo by PCR Not Detected Not Detected   Urinalysis With Culture If Indicated - Straight Cath    Collection Time: 12/06/22  4:56 AM    Specimen: Straight Cath; Urine   Result Value Ref Range    Color, UA Dark Yellow (A) Yellow, Straw    Appearance, UA Turbid (A) Clear    pH, UA <=5.0 5.0 - 8.0    Specific Gravity, UA 1.028 1.005 - 1.030    Glucose, UA Negative Negative    Ketones, UA Trace (A) Negative    Bilirubin, UA Negative Negative    Blood, UA Trace (A) Negative    Protein, UA 30 mg/dL (1+) (A) Negative    Leuk Esterase, UA Moderate (2+) (A) Negative    Nitrite, UA Positive (A) Negative    Urobilinogen, UA 2.0 E.U./dL (A) 0.2 - 1.0 E.U./dL   Urine Drug Screen - Straight Cath    Collection Time: 12/06/22  4:56 AM    Specimen: Straight Cath; Urine   Result Value Ref Range    Amphet/Methamphet, Screen Positive (A) Negative    Barbiturates Screen, Urine Negative Negative    Benzodiazepine Screen, Urine Negative  Negative    Cocaine Screen, Urine Negative Negative    Opiate Screen Negative Negative    THC, Screen, Urine Negative Negative    Methadone Screen, Urine Negative Negative    Oxycodone Screen, Urine Negative Negative   POC Glucose Once    Collection Time: 12/06/22  5:06 AM    Specimen: Blood   Result Value Ref Range    Glucose 126 70 - 130 mg/dL   Blood Gas, Arterial -    Collection Time: 12/06/22  5:06 AM    Specimen: Arterial Blood   Result Value Ref Range    Site Arterial: right brachial     Eugenio's Test N/A     pH, Arterial 7.266 (C) 7.350 - 7.450 pH units    pCO2, Arterial 67.7 (C) 35.0 - 45.0 mm Hg    pO2, Arterial 87.9 80.0 - 100.0 mm Hg    HCO3, Arterial 30.8 (H) 22.0 - 28.0 mmol/L    Base Excess, Arterial 1.6 0.0 - 2.0 mmol/L    O2 Saturation Calculated 94.8 92.0 - 99.0 %    Barometric Pressure for Blood Gas 752.7 mmHg    Modality Cannula     Flow Rate 3.5 lpm    Rate 20 Breaths/minute   Protime-INR    Collection Time: 12/06/22  5:28 AM    Specimen: Arm, Left; Blood   Result Value Ref Range    Protime 13.9 11.7 - 14.2 Seconds    INR 1.06 0.90 - 1.10   aPTT    Collection Time: 12/06/22  5:28 AM    Specimen: Arm, Left; Blood   Result Value Ref Range    PTT 32.8 22.7 - 35.4 seconds       Ordered the above labs and independently reviewed the results.        RADIOLOGY  XR Chest 1 View    Result Date: 12/6/2022  Patient: ALEJO OLIVER  Time Out: 06:19 Exam(s): FILM CXR 1 VIEW EXAM:   XR Chest, 1 View CLINICAL HISTORY:    Reason for exam: Acute hypoxemic respiratory failure. TECHNIQUE:   Frontal view of the chest. COMPARISON:   No relevant prior studies available. FINDINGS:   Lungs:  The heart is at the upper limits of normal with evidence of mild pulmonary vascular congestion pulmonary edema, similar to prior study.  Interstitial pneumonitis would be included in the differential.   Pleural space:  Unremarkable.  No pneumothorax.   Heart:  Unremarkable.  No cardiomegaly.   Mediastinum:  Unremarkable.   Bones  joints: Status post right total shoulder arthroplasty. IMPRESSION:       The heart is at the upper limits of normal with evidence of mild pulmonary vascular congestion pulmonary edema, similar to prior study.  Interstitial pneumonitis would be included in the differential.     Electronically signed by Francois Sequeira MD on 12-06-22 at 0619      I ordered the above noted radiological studies. Reviewed by me and discussed with radiologist.  See dictation for official radiology interpretation.      PROCEDURES    Procedures        MEDICATIONS GIVEN IN ER    Medications   sodium chloride 0.9 % flush 10 mL (has no administration in time range)   furosemide (LASIX) injection 40 mg (has no administration in time range)   cefTRIAXone (ROCEPHIN) 2 g in sodium chloride 0.9 % 100 mL IVPB-VTB (has no administration in time range)   Lido-EPINEPHrine-Tetracaine 4-0.18-0.5 % gel 3 mL (3 mL Topical Given by Other 12/6/22 0521)   Tetanus-Diphth-Acell Pertussis (BOOSTRIX) injection 0.5 mL (0.5 mL Intramuscular Given 12/6/22 0508)   lidocaine (XYLOCAINE) 1 % injection 10 mL (10 mL Injection Given by Other 12/6/22 0521)   iopamidol (ISOVUE-300) 61 % injection 100 mL (85 mL Intravenous Given by Other 12/6/22 0704)         PROGRESS, DATA ANALYSIS, CONSULTS, AND MEDICAL DECISION MAKING    My differential diagnosis for altered mental status includes but is not limited to:  Hypoglycemia, hyperglycemia, DKA, overdose, ethanol intoxication, thiamine deficiency, niacin deficiency, hypothymia, hyperviscosity, Alejandro’s disease, hyponatremia, hypernatremia, liver failure, kidney failure, hyper or hypothyroid, no insufficiency, hypoxia, hypercarbia, carbon monoxide poisoning, postanoxic encephalopathy, ischemic stroke, intracranial bleed, subarachnoid hemorrhage, brain tumor, closed head injury, epidural hematoma, epidural hematoma, seizure activity, postictal state, syncopal episode, disseminated encephalomyelitis, central pontine  myelinolysis, post cardiac arrest, bacterial meningitis, viral meningitis, fungal meningitis, encephalitis, brain abscess, subdural empyema, hysteria, catatonic state, malingering, hypertensive encephalopathy, vasculitis, TTP, DIC    My differential diagnosis includes but is not limited to cerebral contusion, cervical strain, concussion with LOC, concussion without LOC, contusion, fracture of the skull, orbits or mandible, hematoma, intracranial hemorrhage including subdural, epidural, subarachnoid and intracerebral, laceration and postconcussion syndrome      Total critical care time: Approximately 45 minutes    Due to a high probability of clinically significant, life threatening deterioration, the patient required my highest level of preparedness to intervene emergently and I personally spent this critical care time directly and personally managing the patient. This critical care time included obtaining a history; examining the patient; vital sign monitoring; ordering and review of studies; arranging urgent treatment with development of a management plan; evaluation of patient's response to treatment; frequent reassessment; and, discussions with other providers.    This critical care time was performed to assess and manage the high probability of imminent, life-threatening deterioration that could result in multi-organ failure. It was exclusive of separately billable procedures and treating other patients and teaching time.    Please see MDM section and the rest of the note for further information on patient assessment and treatment.      All labs have been independently reviewed by me.  All radiology studies have been reviewed by me and discussed with radiologist dictating the report.   EKG's independently viewed and interpreted by me.  Discussion below represents my analysis of pertinent findings related to patient's condition, differential diagnosis, treatment plan and final disposition.      ED Course as of  12/06/22 0708   Tue Dec 06, 2022   0429 EKG           EKG time: 0 422  Rhythm/Rate: Sinus, 95  P waves and MA: MAVERICK within normal limits  QRS, axis: Narrow complex  ST and T waves: No STEMI; QTC within normal limits    Interpreted Contemporaneously by me, independently viewed  Comparison: 8-22   [RS]   0521 pH, Arterial(!!): 7.266 [RS]   0521 pCO2, Arterial(!!): 67.7  BiPAP will be initiated. [RS]   0521 Ethanol: <10 [RS]   0521 Creatine Kinase: 116 [RS]   0535 BUN: 19 [RS]   0535 Creatinine: 1.15 [RS]   0535 Sodium: 142 [RS]   0535 Potassium: 4.8 [RS]   0536 ALT (SGPT): 29 [RS]   0536 AST (SGOT): 33 [RS]   0536 Total Bilirubin: 0.4 [RS]   0536 proBNP(!): 2,489.0 [RS]   0536 Troponin T(!!): 0.159  Awaiting head CT to ensure no intracranial hemorrhage before aspirin is administered.  Patient denies ongoing chest pain at this time. [RS]   0644 Leukocytes, UA(!): Moderate (2+) [RS]   0644 Nitrite, UA(!): Positive [RS]   0644 Wound has been cleaned and sutured by Mr. Byron PA-C.  See his note for laceration repair. [RS]   0645 Still awaiting patient to go to CT.  Given BNP, acute hypoxemia, and chest x-ray, furosemide has been ordered.  Patient appears to be furosemide naïve so we will begin with only 40 mg IV. [RS]   0706 This patient case is reviewed with Dr. Jfef Rodriguez who is agreeable to accept change of care with plan for follow-up of CT results and ultimately admission for this patient. [RS]      ED Course User Index  [RS] Toñito Pena MD       AS OF 07:08 EST VITALS:    BP - 131/79  HR - 90  TEMP - 98.6 °F (37 °C) (Tympanic)  O2 SATS - 94%        DIAGNOSIS  Final diagnoses:   Fall in home, initial encounter   Injury of head, initial encounter   Laceration of left ear, initial encounter   Blunt trauma to abdomen, initial encounter   Acute respiratory failure with hypoxia and hypercapnia (HCC)   Acute pulmonary edema (HCC)   Elevated troponin   Acute UTI         DISPOSITION  Pending admission          Jean  Toñito ALEXANDER MD  12/06/22 0708

## 2022-12-06 NOTE — ED TRIAGE NOTES
Patient to ED from home per EMS w/ reports of fall out of bed, laceration above L ear. Patient was on the floor for approx 1 hour. Patient denies LOC, blood thinner use. Per EMS, patient w/ repetitive questioning. Per EMS, patient was 84% on room air; history of COPD.

## 2022-12-07 ENCOUNTER — APPOINTMENT (OUTPATIENT)
Dept: CARDIOLOGY | Facility: HOSPITAL | Age: 72
End: 2022-12-07

## 2022-12-07 ENCOUNTER — APPOINTMENT (OUTPATIENT)
Dept: CT IMAGING | Facility: HOSPITAL | Age: 72
End: 2022-12-07

## 2022-12-07 LAB
ANION GAP SERPL CALCULATED.3IONS-SCNC: 10.2 MMOL/L (ref 5–15)
BASOPHILS # BLD AUTO: 0.03 10*3/MM3 (ref 0–0.2)
BASOPHILS NFR BLD AUTO: 0.4 % (ref 0–1.5)
BH CV ECHO MEAS - ACS: 2.23 CM
BH CV ECHO MEAS - AO MAX PG: 6.1 MMHG
BH CV ECHO MEAS - AO MEAN PG: 3.1 MMHG
BH CV ECHO MEAS - AO ROOT DIAM: 3.7 CM
BH CV ECHO MEAS - AO V2 MAX: 123.5 CM/SEC
BH CV ECHO MEAS - AO V2 VTI: 22.9 CM
BH CV ECHO MEAS - AVA(I,D): 2.8 CM2
BH CV ECHO MEAS - EDV(CUBED): 63.5 ML
BH CV ECHO MEAS - EDV(MOD-SP2): 76 ML
BH CV ECHO MEAS - EDV(MOD-SP4): 66 ML
BH CV ECHO MEAS - EF(MOD-BP): 65.4 %
BH CV ECHO MEAS - EF(MOD-SP2): 63.2 %
BH CV ECHO MEAS - EF(MOD-SP4): 68.2 %
BH CV ECHO MEAS - ESV(CUBED): 19.5 ML
BH CV ECHO MEAS - ESV(MOD-SP2): 28 ML
BH CV ECHO MEAS - ESV(MOD-SP4): 21 ML
BH CV ECHO MEAS - FS: 32.5 %
BH CV ECHO MEAS - IVS/LVPW: 0.98 CM
BH CV ECHO MEAS - IVSD: 1.33 CM
BH CV ECHO MEAS - LA DIMENSION: 2.48 CM
BH CV ECHO MEAS - LAT PEAK E' VEL: 8.7 CM/SEC
BH CV ECHO MEAS - LV DIASTOLIC VOL/BSA (35-75): 33.4 CM2
BH CV ECHO MEAS - LV MASS(C)D: 195.6 GRAMS
BH CV ECHO MEAS - LV MAX PG: 2.9 MMHG
BH CV ECHO MEAS - LV MEAN PG: 1.29 MMHG
BH CV ECHO MEAS - LV SYSTOLIC VOL/BSA (12-30): 10.6 CM2
BH CV ECHO MEAS - LV V1 MAX: 85.3 CM/SEC
BH CV ECHO MEAS - LV V1 VTI: 20.5 CM
BH CV ECHO MEAS - LVIDD: 4 CM
BH CV ECHO MEAS - LVIDS: 2.7 CM
BH CV ECHO MEAS - LVOT AREA: 3.1 CM2
BH CV ECHO MEAS - LVOT DIAM: 1.98 CM
BH CV ECHO MEAS - LVPWD: 1.36 CM
BH CV ECHO MEAS - MED PEAK E' VEL: 5.7 CM/SEC
BH CV ECHO MEAS - MV A MAX VEL: 106.7 CM/SEC
BH CV ECHO MEAS - MV DEC SLOPE: 335 CM/SEC2
BH CV ECHO MEAS - MV DEC TIME: 0.19 MSEC
BH CV ECHO MEAS - MV E MAX VEL: 69.4 CM/SEC
BH CV ECHO MEAS - MV E/A: 0.65
BH CV ECHO MEAS - MV MAX PG: 7.7 MMHG
BH CV ECHO MEAS - MV MEAN PG: 3.1 MMHG
BH CV ECHO MEAS - MV P1/2T: 91.4 MSEC
BH CV ECHO MEAS - MV V2 VTI: 46.6 CM
BH CV ECHO MEAS - MVA(P1/2T): 2.41 CM2
BH CV ECHO MEAS - MVA(VTI): 1.36 CM2
BH CV ECHO MEAS - PA ACC TIME: 0.08 SEC
BH CV ECHO MEAS - PA PR(ACCEL): 44.5 MMHG
BH CV ECHO MEAS - PA V2 MAX: 92.3 CM/SEC
BH CV ECHO MEAS - QP/QS: 0.55
BH CV ECHO MEAS - RV MAX PG: 1.42 MMHG
BH CV ECHO MEAS - RV V1 MAX: 59.6 CM/SEC
BH CV ECHO MEAS - RV V1 VTI: 9.1 CM
BH CV ECHO MEAS - RVOT DIAM: 2.2 CM
BH CV ECHO MEAS - SI(MOD-SP2): 24.3 ML/M2
BH CV ECHO MEAS - SI(MOD-SP4): 22.8 ML/M2
BH CV ECHO MEAS - SV(LVOT): 63.2 ML
BH CV ECHO MEAS - SV(MOD-SP2): 48 ML
BH CV ECHO MEAS - SV(MOD-SP4): 45 ML
BH CV ECHO MEAS - SV(RVOT): 34.6 ML
BH CV ECHO MEAS - TAPSE (>1.6): 2.25 CM
BH CV ECHO MEASUREMENTS AVERAGE E/E' RATIO: 9.64
BH CV XLRA - RV BASE: 3.3 CM
BH CV XLRA - RV LENGTH: 7 CM
BH CV XLRA - RV MID: 3.1 CM
BUN SERPL-MCNC: 21 MG/DL (ref 8–23)
BUN/CREAT SERPL: 25 (ref 7–25)
CALCIUM SPEC-SCNC: 9 MG/DL (ref 8.6–10.5)
CHLORIDE SERPL-SCNC: 102 MMOL/L (ref 98–107)
CO2 SERPL-SCNC: 30.8 MMOL/L (ref 22–29)
CREAT SERPL-MCNC: 0.84 MG/DL (ref 0.76–1.27)
DEPRECATED RDW RBC AUTO: 40.8 FL (ref 37–54)
EGFRCR SERPLBLD CKD-EPI 2021: 92.7 ML/MIN/1.73
EOSINOPHIL # BLD AUTO: 0.06 10*3/MM3 (ref 0–0.4)
EOSINOPHIL NFR BLD AUTO: 0.8 % (ref 0.3–6.2)
ERYTHROCYTE [DISTWIDTH] IN BLOOD BY AUTOMATED COUNT: 11.9 % (ref 12.3–15.4)
GLUCOSE BLDC GLUCOMTR-MCNC: 104 MG/DL (ref 70–130)
GLUCOSE BLDC GLUCOMTR-MCNC: 107 MG/DL (ref 70–130)
GLUCOSE BLDC GLUCOMTR-MCNC: 85 MG/DL (ref 70–130)
GLUCOSE BLDC GLUCOMTR-MCNC: 92 MG/DL (ref 70–130)
GLUCOSE SERPL-MCNC: 92 MG/DL (ref 65–99)
HCT VFR BLD AUTO: 40.9 % (ref 37.5–51)
HGB BLD-MCNC: 12.8 G/DL (ref 13–17.7)
IMM GRANULOCYTES # BLD AUTO: 0.07 10*3/MM3 (ref 0–0.05)
IMM GRANULOCYTES NFR BLD AUTO: 0.9 % (ref 0–0.5)
LEFT ATRIUM VOLUME INDEX: 17.3 ML/M2
LYMPHOCYTES # BLD AUTO: 1.11 10*3/MM3 (ref 0.7–3.1)
LYMPHOCYTES NFR BLD AUTO: 14.2 % (ref 19.6–45.3)
MAXIMAL PREDICTED HEART RATE: 148 BPM
MCH RBC QN AUTO: 29.9 PG (ref 26.6–33)
MCHC RBC AUTO-ENTMCNC: 31.3 G/DL (ref 31.5–35.7)
MCV RBC AUTO: 95.6 FL (ref 79–97)
MONOCYTES # BLD AUTO: 0.79 10*3/MM3 (ref 0.1–0.9)
MONOCYTES NFR BLD AUTO: 10.1 % (ref 5–12)
NEUTROPHILS NFR BLD AUTO: 5.75 10*3/MM3 (ref 1.7–7)
NEUTROPHILS NFR BLD AUTO: 73.6 % (ref 42.7–76)
NRBC BLD AUTO-RTO: 0 /100 WBC (ref 0–0.2)
PLATELET # BLD AUTO: 185 10*3/MM3 (ref 140–450)
PMV BLD AUTO: 9.9 FL (ref 6–12)
POTASSIUM SERPL-SCNC: 4.5 MMOL/L (ref 3.5–5.2)
RBC # BLD AUTO: 4.28 10*6/MM3 (ref 4.14–5.8)
SINUS: 2.5 CM
SODIUM SERPL-SCNC: 143 MMOL/L (ref 136–145)
STJ: 2.6 CM
STRESS TARGET HR: 126 BPM
WBC NRBC COR # BLD: 7.81 10*3/MM3 (ref 3.4–10.8)

## 2022-12-07 PROCEDURE — 94799 UNLISTED PULMONARY SVC/PX: CPT

## 2022-12-07 PROCEDURE — 99232 SBSQ HOSP IP/OBS MODERATE 35: CPT | Performed by: INTERNAL MEDICINE

## 2022-12-07 PROCEDURE — 93306 TTE W/DOPPLER COMPLETE: CPT

## 2022-12-07 PROCEDURE — 0 IOPAMIDOL PER 1 ML: Performed by: INTERNAL MEDICINE

## 2022-12-07 PROCEDURE — 85025 COMPLETE CBC W/AUTO DIFF WBC: CPT | Performed by: INTERNAL MEDICINE

## 2022-12-07 PROCEDURE — 93306 TTE W/DOPPLER COMPLETE: CPT | Performed by: INTERNAL MEDICINE

## 2022-12-07 PROCEDURE — 80048 BASIC METABOLIC PNL TOTAL CA: CPT | Performed by: INTERNAL MEDICINE

## 2022-12-07 PROCEDURE — 94761 N-INVAS EAR/PLS OXIMETRY MLT: CPT

## 2022-12-07 PROCEDURE — 94660 CPAP INITIATION&MGMT: CPT

## 2022-12-07 PROCEDURE — 94664 DEMO&/EVAL PT USE INHALER: CPT

## 2022-12-07 PROCEDURE — 25010000002 CEFTRIAXONE PER 250 MG: Performed by: INTERNAL MEDICINE

## 2022-12-07 PROCEDURE — 25010000002 PERFLUTREN (DEFINITY) 8.476 MG IN SODIUM CHLORIDE (PF) 0.9 % 10 ML INJECTION: Performed by: INTERNAL MEDICINE

## 2022-12-07 PROCEDURE — 82962 GLUCOSE BLOOD TEST: CPT

## 2022-12-07 PROCEDURE — 71275 CT ANGIOGRAPHY CHEST: CPT

## 2022-12-07 PROCEDURE — 94760 N-INVAS EAR/PLS OXIMETRY 1: CPT

## 2022-12-07 RX ORDER — LISINOPRIL 5 MG/1
5 TABLET ORAL ONCE
Status: COMPLETED | OUTPATIENT
Start: 2022-12-07 | End: 2022-12-07

## 2022-12-07 RX ORDER — LISINOPRIL 10 MG/1
10 TABLET ORAL EVERY MORNING
Status: DISCONTINUED | OUTPATIENT
Start: 2022-12-08 | End: 2022-12-08

## 2022-12-07 RX ORDER — INSULIN LISPRO 100 [IU]/ML
0-14 INJECTION, SOLUTION INTRAVENOUS; SUBCUTANEOUS
Status: DISCONTINUED | OUTPATIENT
Start: 2022-12-07 | End: 2022-12-10 | Stop reason: HOSPADM

## 2022-12-07 RX ORDER — GUAIFENESIN 200 MG/10ML
200 LIQUID ORAL EVERY 4 HOURS PRN
Status: DISCONTINUED | OUTPATIENT
Start: 2022-12-07 | End: 2022-12-10

## 2022-12-07 RX ORDER — DEXTROMETHORPHAN POLISTIREX 30 MG/5ML
30 SUSPENSION ORAL 2 TIMES DAILY PRN
Status: DISCONTINUED | OUTPATIENT
Start: 2022-12-07 | End: 2022-12-10

## 2022-12-07 RX ORDER — LISINOPRIL 5 MG/1
5 TABLET ORAL EVERY MORNING
Status: DISCONTINUED | OUTPATIENT
Start: 2022-12-07 | End: 2022-12-07

## 2022-12-07 RX ORDER — FINASTERIDE 5 MG/1
5 TABLET, FILM COATED ORAL EVERY MORNING
Status: DISCONTINUED | OUTPATIENT
Start: 2022-12-07 | End: 2022-12-10 | Stop reason: HOSPADM

## 2022-12-07 RX ADMIN — IPRATROPIUM BROMIDE AND ALBUTEROL SULFATE 3 ML: 2.5; .5 SOLUTION RESPIRATORY (INHALATION) at 21:29

## 2022-12-07 RX ADMIN — PERFLUTREN 2 ML: 6.52 INJECTION, SUSPENSION INTRAVENOUS at 14:51

## 2022-12-07 RX ADMIN — CEFTRIAXONE SODIUM 1 G: 1 INJECTION, POWDER, FOR SOLUTION INTRAMUSCULAR; INTRAVENOUS at 00:54

## 2022-12-07 RX ADMIN — IPRATROPIUM BROMIDE AND ALBUTEROL SULFATE 3 ML: 2.5; .5 SOLUTION RESPIRATORY (INHALATION) at 12:01

## 2022-12-07 RX ADMIN — ACETAMINOPHEN 650 MG: 325 TABLET, FILM COATED ORAL at 20:03

## 2022-12-07 RX ADMIN — IPRATROPIUM BROMIDE AND ALBUTEROL SULFATE 3 ML: 2.5; .5 SOLUTION RESPIRATORY (INHALATION) at 08:23

## 2022-12-07 RX ADMIN — LISINOPRIL 5 MG: 5 TABLET ORAL at 18:03

## 2022-12-07 RX ADMIN — IPRATROPIUM BROMIDE AND ALBUTEROL SULFATE 3 ML: 2.5; .5 SOLUTION RESPIRATORY (INHALATION) at 16:03

## 2022-12-07 RX ADMIN — FINASTERIDE 5 MG: 5 TABLET, FILM COATED ORAL at 13:39

## 2022-12-07 RX ADMIN — IOPAMIDOL 85 ML: 755 INJECTION, SOLUTION INTRAVENOUS at 18:21

## 2022-12-07 RX ADMIN — LISINOPRIL 5 MG: 5 TABLET ORAL at 13:39

## 2022-12-07 NOTE — NURSING NOTE
Pt transferred from ed via stretcher to ccu around 1850 pm . A & O x4. Follows simple commands. On Bipap currently sating 94-95%. External catheter in place. Vital stable. Will keep monitoring.

## 2022-12-07 NOTE — PROGRESS NOTES
"   LOS: 1 day   Patient Care Team:  Vicente Mathews MD as PCP - General (Internal Medicine)    Chief Complaint: fall     Interval History: Very alert, feels so much better. Wants to go home.       Objective   Vital Signs  Temp:  [97.5 °F (36.4 °C)-99.3 °F (37.4 °C)] 98.1 °F (36.7 °C)  Heart Rate:  [] 101  Resp:  [16-22] 18  BP: (135-187)/() 182/111    Intake/Output Summary (Last 24 hours) at 12/7/2022 1603  Last data filed at 12/7/2022 1528  Gross per 24 hour   Intake 100 ml   Output 1100 ml   Net -1000 ml     Last Weight and Admission Weight        12/07/22  1413   Weight: 83.9 kg (185 lb)     Flowsheet Rows    Flowsheet Row First Filed Value   Admission Height 172.7 cm (68\") Documented at 12/06/2022 0430   Admission Weight 99.3 kg (219 lb) Documented at 12/06/2022 0430          Physical Exam  Vitals reviewed.   Constitutional:       Comments: obese   HENT:      Head: Normocephalic.      Nose: Nose normal.      Mouth/Throat:      Pharynx: Oropharynx is clear.   Eyes:      Conjunctiva/sclera: Conjunctivae normal.   Neck:      Comments: Cannot assess JVD due to body habitus  Cardiovascular:      Rate and Rhythm: Regular rhythm. Tachycardia present.      Pulses: Normal pulses.      Heart sounds: Normal heart sounds.   Pulmonary:      Effort: Pulmonary effort is normal.      Breath sounds: Normal breath sounds.   Abdominal:      Palpations: Abdomen is soft.      Comments: Cannot feel organs or aorta   Musculoskeletal:         General: No swelling. Normal range of motion.      Cervical back: Normal range of motion.   Skin:     General: Skin is warm and dry.      Findings: No erythema.   Neurological:      General: No focal deficit present.      Mental Status: He is alert.   Psychiatric:         Mood and Affect: Mood normal.       Results Review:      Results from last 7 days   Lab Units 12/07/22  0436 12/06/22  0918 12/06/22  0439   SODIUM mmol/L 143 141 142   POTASSIUM mmol/L 4.5 5.2 4.8   CHLORIDE " mmol/L 102 104 106   CO2 mmol/L 30.8* 29.3* 26.2   BUN mg/dL 21 22 19   CREATININE mg/dL 0.84 1.39* 1.15   GLUCOSE mg/dL 92 117* 130*   CALCIUM mg/dL 9.0 8.8 8.6     Results from last 7 days   Lab Units 12/06/22  0918 12/06/22  0439   CK TOTAL U/L  --  116   TROPONIN T ng/mL 0.208* 0.159*     Results from last 7 days   Lab Units 12/07/22  0436 12/06/22  0918 12/06/22  0439   WBC 10*3/mm3 7.81 8.06 8.56   HEMOGLOBIN g/dL 12.8* 12.7* 13.8   HEMATOCRIT % 40.9 38.8 43.0   PLATELETS 10*3/mm3 185 195 170     Results from last 7 days   Lab Units 12/06/22  0528   INR  1.06   APTT seconds 32.8                   I reviewed the patient's new clinical results.  I personally viewed and interpreted the patient's EKG/Telemetry data        Medication Review:   cefTRIAXone, 1 g, Intravenous, Q24H  finasteride, 5 mg, Oral, QAM  ipratropium-albuterol, 3 mL, Nebulization, 4x Daily - RT  lisinopril, 5 mg, Oral, QAM             Assessment & Plan     1. Fall at home  2. Metabolic encephalopathy  3. Acute hypercapneic respiratory failure  4. COPD  5. UTI  6. Elevated Tn/proBNP    *His echo was completed this afternoon -- he has normal LVSF and wall motion. However, his RV is severely dilated and hypokinetic. A PE could explain his Tn and BNP elevation and hypoxia. I ordered a CTA.    *Increase lisinopril to home dose of 10mg daily    *We will follow       Vikram So MD  12/07/22  16:03 EST

## 2022-12-07 NOTE — PLAN OF CARE
Goal Outcome Evaluation:      VSS. Tolerated BIPAP overnight for approx 4 hours. Normal WOB on NC @ 4L.

## 2022-12-07 NOTE — PROGRESS NOTES
Discharge Planning Assessment  [facility]     Patient Name: Russell Christine  MRN: [id number]  Today's Date: 12/7/2022    Admit Date: 12/6/2022    Plan: Home with no needs   Discharge Needs Assessment     Row Name 12/07/22 1305       Living Environment    People in Home spouse    Current Living Arrangements home    Potentially Unsafe Housing Conditions unable to assess    Primary Care Provided by self    Provides Primary Care For no one    Family Caregiver if Needed spouse    Quality of Family Relationships supportive    Able to Return to Prior Arrangements yes       Resource/Environmental Concerns    Resource/Environmental Concerns none    Transportation Concerns none       Transition Planning    Patient/Family Anticipates Transition to home with family    Patient/Family Anticipated Services at Transition none    Transportation Anticipated family or friend will provide       Discharge Needs Assessment    Equipment Currently Used at Home walker, standard    Concerns to be Addressed discharge planning    Anticipated Changes Related to Illness none    Equipment Needed After Discharge none               Discharge Plan     Row Name 12/07/22 1306       Plan    Plan Home with no needs    Plan Comments IMM noted. CCP spoke to patient at bedside.   CCP role explained.  Discharge planning discussed. Face sheet verified.   Pt emergency contact is his wife Ailin [phone].   Pt obtains [facility] or [facility].  [facility]   Pt lives in an house with his wife.  Pt is independent with ADL’s.  He uses walker to ambulate.  He has no history of rehab.  He has no Home Health history.  Plan is home.  CCP following for needs              Continued Care and Services - Admitted Since 12/6/2022    Coordination has not been started for this encounter.          Demographic Summary    No documentation.                Functional Status    No documentation.                 Psychosocial    No documentation.                Abuse/Neglect    No documentation.                Legal    No documentation.                Substance Abuse    No documentation.                Patient Forms    No documentation.                   Katiana Hernandez RN

## 2022-12-08 LAB
ANION GAP SERPL CALCULATED.3IONS-SCNC: 12.8 MMOL/L (ref 5–15)
BACTERIA SPEC AEROBE CULT: ABNORMAL
BACTERIA UR QL AUTO: ABNORMAL /HPF
BASOPHILS # BLD AUTO: 0.06 10*3/MM3 (ref 0–0.2)
BASOPHILS NFR BLD AUTO: 0.7 % (ref 0–1.5)
BILIRUB UR QL STRIP: NEGATIVE
BUN SERPL-MCNC: 15 MG/DL (ref 8–23)
BUN/CREAT SERPL: 19.5 (ref 7–25)
CALCIUM SPEC-SCNC: 9.6 MG/DL (ref 8.6–10.5)
CHLORIDE SERPL-SCNC: 98 MMOL/L (ref 98–107)
CLARITY UR: CLEAR
CO2 SERPL-SCNC: 29.2 MMOL/L (ref 22–29)
COLOR UR: YELLOW
CREAT SERPL-MCNC: 0.77 MG/DL (ref 0.76–1.27)
DEPRECATED RDW RBC AUTO: 42.1 FL (ref 37–54)
EGFRCR SERPLBLD CKD-EPI 2021: 95.1 ML/MIN/1.73
EOSINOPHIL # BLD AUTO: 0.1 10*3/MM3 (ref 0–0.4)
EOSINOPHIL NFR BLD AUTO: 1.1 % (ref 0.3–6.2)
ERYTHROCYTE [DISTWIDTH] IN BLOOD BY AUTOMATED COUNT: 11.8 % (ref 12.3–15.4)
GLUCOSE BLDC GLUCOMTR-MCNC: 103 MG/DL (ref 70–130)
GLUCOSE BLDC GLUCOMTR-MCNC: 108 MG/DL (ref 70–130)
GLUCOSE BLDC GLUCOMTR-MCNC: 116 MG/DL (ref 70–130)
GLUCOSE BLDC GLUCOMTR-MCNC: 137 MG/DL (ref 70–130)
GLUCOSE SERPL-MCNC: 94 MG/DL (ref 65–99)
GLUCOSE UR STRIP-MCNC: NEGATIVE MG/DL
HCT VFR BLD AUTO: 44.8 % (ref 37.5–51)
HGB BLD-MCNC: 14.6 G/DL (ref 13–17.7)
HGB UR QL STRIP.AUTO: NEGATIVE
HYALINE CASTS UR QL AUTO: ABNORMAL /LPF
IMM GRANULOCYTES # BLD AUTO: 0.06 10*3/MM3 (ref 0–0.05)
IMM GRANULOCYTES NFR BLD AUTO: 0.7 % (ref 0–0.5)
KETONES UR QL STRIP: ABNORMAL
LEUKOCYTE ESTERASE UR QL STRIP.AUTO: ABNORMAL
LYMPHOCYTES # BLD AUTO: 1.31 10*3/MM3 (ref 0.7–3.1)
LYMPHOCYTES NFR BLD AUTO: 14.3 % (ref 19.6–45.3)
MCH RBC QN AUTO: 31.3 PG (ref 26.6–33)
MCHC RBC AUTO-ENTMCNC: 32.6 G/DL (ref 31.5–35.7)
MCV RBC AUTO: 95.9 FL (ref 79–97)
MONOCYTES # BLD AUTO: 0.81 10*3/MM3 (ref 0.1–0.9)
MONOCYTES NFR BLD AUTO: 8.8 % (ref 5–12)
NEUTROPHILS NFR BLD AUTO: 6.82 10*3/MM3 (ref 1.7–7)
NEUTROPHILS NFR BLD AUTO: 74.4 % (ref 42.7–76)
NITRITE UR QL STRIP: NEGATIVE
NRBC BLD AUTO-RTO: 0 /100 WBC (ref 0–0.2)
PH UR STRIP.AUTO: 8 [PH] (ref 5–8)
PLATELET # BLD AUTO: 169 10*3/MM3 (ref 140–450)
PMV BLD AUTO: 10.9 FL (ref 6–12)
POTASSIUM SERPL-SCNC: 4.3 MMOL/L (ref 3.5–5.2)
PROCALCITONIN SERPL-MCNC: 0.06 NG/ML (ref 0–0.25)
PROT UR QL STRIP: ABNORMAL
RBC # BLD AUTO: 4.67 10*6/MM3 (ref 4.14–5.8)
RBC # UR STRIP: ABNORMAL /HPF
REF LAB TEST METHOD: ABNORMAL
SODIUM SERPL-SCNC: 140 MMOL/L (ref 136–145)
SP GR UR STRIP: 1.02 (ref 1–1.03)
SQUAMOUS #/AREA URNS HPF: ABNORMAL /HPF
UROBILINOGEN UR QL STRIP: ABNORMAL
WBC # UR STRIP: ABNORMAL /HPF
WBC NRBC COR # BLD: 9.16 10*3/MM3 (ref 3.4–10.8)

## 2022-12-08 PROCEDURE — 81001 URINALYSIS AUTO W/SCOPE: CPT | Performed by: INTERNAL MEDICINE

## 2022-12-08 PROCEDURE — 94799 UNLISTED PULMONARY SVC/PX: CPT

## 2022-12-08 PROCEDURE — 82962 GLUCOSE BLOOD TEST: CPT

## 2022-12-08 PROCEDURE — 84145 PROCALCITONIN (PCT): CPT | Performed by: INTERNAL MEDICINE

## 2022-12-08 PROCEDURE — 99232 SBSQ HOSP IP/OBS MODERATE 35: CPT | Performed by: NURSE PRACTITIONER

## 2022-12-08 PROCEDURE — 36415 COLL VENOUS BLD VENIPUNCTURE: CPT | Performed by: INTERNAL MEDICINE

## 2022-12-08 PROCEDURE — 80048 BASIC METABOLIC PNL TOTAL CA: CPT | Performed by: INTERNAL MEDICINE

## 2022-12-08 PROCEDURE — 94664 DEMO&/EVAL PT USE INHALER: CPT

## 2022-12-08 PROCEDURE — 94761 N-INVAS EAR/PLS OXIMETRY MLT: CPT

## 2022-12-08 PROCEDURE — 85025 COMPLETE CBC W/AUTO DIFF WBC: CPT | Performed by: INTERNAL MEDICINE

## 2022-12-08 PROCEDURE — 25010000002 CEFTRIAXONE PER 250 MG: Performed by: INTERNAL MEDICINE

## 2022-12-08 RX ORDER — GABAPENTIN 300 MG/1
300 CAPSULE ORAL EVERY 12 HOURS SCHEDULED
Status: DISCONTINUED | OUTPATIENT
Start: 2022-12-08 | End: 2022-12-10 | Stop reason: HOSPADM

## 2022-12-08 RX ORDER — LABETALOL HYDROCHLORIDE 5 MG/ML
20 INJECTION, SOLUTION INTRAVENOUS
Status: DISCONTINUED | OUTPATIENT
Start: 2022-12-08 | End: 2022-12-08

## 2022-12-08 RX ORDER — MIRTAZAPINE 15 MG/1
15 TABLET, ORALLY DISINTEGRATING ORAL NIGHTLY
Status: DISCONTINUED | OUTPATIENT
Start: 2022-12-08 | End: 2022-12-10 | Stop reason: HOSPADM

## 2022-12-08 RX ORDER — TRAZODONE HYDROCHLORIDE 50 MG/1
25 TABLET ORAL NIGHTLY
Status: DISCONTINUED | OUTPATIENT
Start: 2022-12-08 | End: 2022-12-10 | Stop reason: HOSPADM

## 2022-12-08 RX ORDER — LISINOPRIL 10 MG/1
10 TABLET ORAL ONCE
Status: COMPLETED | OUTPATIENT
Start: 2022-12-08 | End: 2022-12-08

## 2022-12-08 RX ORDER — LISINOPRIL 20 MG/1
20 TABLET ORAL EVERY MORNING
Status: DISCONTINUED | OUTPATIENT
Start: 2022-12-09 | End: 2022-12-10 | Stop reason: HOSPADM

## 2022-12-08 RX ORDER — DULOXETIN HYDROCHLORIDE 30 MG/1
30 CAPSULE, DELAYED RELEASE ORAL
Status: DISCONTINUED | OUTPATIENT
Start: 2022-12-09 | End: 2022-12-10 | Stop reason: HOSPADM

## 2022-12-08 RX ADMIN — IPRATROPIUM BROMIDE AND ALBUTEROL SULFATE 3 ML: 2.5; .5 SOLUTION RESPIRATORY (INHALATION) at 19:42

## 2022-12-08 RX ADMIN — CEFTRIAXONE SODIUM 1 G: 1 INJECTION, POWDER, FOR SOLUTION INTRAMUSCULAR; INTRAVENOUS at 00:10

## 2022-12-08 RX ADMIN — DEXTROMETHORPHAN POLISTIREX 30 MG: 30 SUSPENSION ORAL at 22:29

## 2022-12-08 RX ADMIN — GABAPENTIN 300 MG: 300 CAPSULE ORAL at 21:02

## 2022-12-08 RX ADMIN — LISINOPRIL 10 MG: 10 TABLET ORAL at 17:16

## 2022-12-08 RX ADMIN — ACETAMINOPHEN 650 MG: 325 TABLET, FILM COATED ORAL at 22:29

## 2022-12-08 RX ADMIN — TRAZODONE HYDROCHLORIDE 25 MG: 50 TABLET ORAL at 21:02

## 2022-12-08 RX ADMIN — IPRATROPIUM BROMIDE AND ALBUTEROL SULFATE 3 ML: 2.5; .5 SOLUTION RESPIRATORY (INHALATION) at 08:34

## 2022-12-08 RX ADMIN — IPRATROPIUM BROMIDE AND ALBUTEROL SULFATE 3 ML: 2.5; .5 SOLUTION RESPIRATORY (INHALATION) at 16:52

## 2022-12-08 RX ADMIN — FINASTERIDE 5 MG: 5 TABLET, FILM COATED ORAL at 06:37

## 2022-12-08 RX ADMIN — LABETALOL HYDROCHLORIDE 20 MG: 5 INJECTION, SOLUTION INTRAVENOUS at 04:46

## 2022-12-08 RX ADMIN — ACETAMINOPHEN 650 MG: 325 TABLET, FILM COATED ORAL at 04:45

## 2022-12-08 RX ADMIN — MIRTAZAPINE 15 MG: 15 TABLET, ORALLY DISINTEGRATING ORAL at 21:02

## 2022-12-08 RX ADMIN — IPRATROPIUM BROMIDE AND ALBUTEROL SULFATE 3 ML: 2.5; .5 SOLUTION RESPIRATORY (INHALATION) at 11:37

## 2022-12-08 RX ADMIN — DEXTROMETHORPHAN POLISTIREX 30 MG: 30 SUSPENSION ORAL at 00:53

## 2022-12-08 RX ADMIN — LISINOPRIL 10 MG: 10 TABLET ORAL at 06:37

## 2022-12-08 NOTE — PROGRESS NOTES
"    Patient Name: Russell Christine  :1950  72 y.o.      Patient Care Team:  Vicente Mathews MD as PCP - General (Internal Medicine)    Chief Complaint: follow up elevated troponin     Interval History: restless, pulling monitor off. Wife at bedside. BP elevated. Received a dose of labetalol early this morning.        Objective   Vital Signs  Temp:  [97.7 °F (36.5 °C)-98.1 °F (36.7 °C)] 98 °F (36.7 °C)  Heart Rate:  [] 92  Resp:  [18-20] 18  BP: (152-182)/() 152/106    Intake/Output Summary (Last 24 hours) at 2022 1440  Last data filed at 2022 1306  Gross per 24 hour   Intake 120 ml   Output 1925 ml   Net -1805 ml     Flowsheet Rows    Flowsheet Row First Filed Value   Admission Height 172.7 cm (68\") Documented at 2022 0430   Admission Weight 99.3 kg (219 lb) Documented at 2022 0430          Physical Exam:   General Appearance:    Alert, restless, in mild distress   Lungs:     Clear to auscultation.  Normal respiratory effort and rate.      Heart:    Regular rhythm and normal rate, normal S1 and S2, no murmurs, gallops or rubs.     Chest Wall:    No abnormalities observed   Abdomen:     Soft, nontender, positive bowel sounds.     Extremities:   no cyanosis, clubbing or edema.  No marked joint deformities.  Adequate musculoskeletal strength.       Results Review:    Results from last 7 days   Lab Units 22  0735   SODIUM mmol/L 140   POTASSIUM mmol/L 4.3   CHLORIDE mmol/L 98   CO2 mmol/L 29.2*   BUN mg/dL 15   CREATININE mg/dL 0.77   GLUCOSE mg/dL 94   CALCIUM mg/dL 9.6     Results from last 7 days   Lab Units 22  0918 22  0439   CK TOTAL U/L  --  116   TROPONIN T ng/mL 0.208* 0.159*     Results from last 7 days   Lab Units 22  0735   WBC 10*3/mm3 9.16   HEMOGLOBIN g/dL 14.6   HEMATOCRIT % 44.8   PLATELETS 10*3/mm3 169     Results from last 7 days   Lab Units 22  0528   INR  1.06   APTT seconds 32.8                       Medication Review: "   cefTRIAXone, 1 g, Intravenous, Q24H  finasteride, 5 mg, Oral, QAM  ipratropium-albuterol, 3 mL, Nebulization, 4x Daily - RT  lisinopril, 10 mg, Oral, QAM              Assessment & Plan   1. Fall at home  2. Metabolic encephalopathy  3. Acute hypoxic and hypercapnic respiratory failure - requiring 1 liter to maintain 88%  4. COPD  5. Urinary tract infection  6. Tobacco use  7. Alcohol use   8. Elevated Tn/proBNP    Echocardiogram with normal left ventricular systolic function and wall motion. CTA ruled out pulmonary embolism. Right ventricular dilation and hypokinesis likely secondary to chronic lung disease.     BP is elevated. Increase lisinopril further.     No clear evidence of acute coronary syndrome. He remains some what encephalopathic however is denying chest pain. At least mild coronary artery calcification noted on CT chest. Will follow.     MARIE Bright  [facility]  12/08/22  14:40 EST

## 2022-12-08 NOTE — PLAN OF CARE
Goal Outcome Evaluation:  Plan of Care Reviewed With: patient, spouse        Progress: no change  Outcome Evaluation: Patient  a transfer on  previous  shift  from  ICU.  CTA  chest  completed.    Alert   with  periods  of  confusion.  Patient  getting  out  of  bed  multiple  times,  pulling  off  oxygen  and  monitor  wanting to  go  outside  and  smoke.  Disoriented  to  time  and  situation.  Patient  offered   Nicotine patch  but  decline.  Blood  Pressure  elevated  this  shift,  Order  received  and   a  dose  of  Labetol  given   Will  monitor  Blood  pressure.   Spouse   at bedside  .SR/SA BBB  and  Trigeminy  on  the  monitor.  Nursing  will  continue  to monitor.

## 2022-12-08 NOTE — PROGRESS NOTES
Dr. MARITZA Green    [address]    12/8/2022    Patient ID:  Name:  Russell Christine  MRN:  [id number]  1950  72 y.o.  male            CC/Reason for visit: Fall, head injury     Interval hx: The patient is much more oriented and pleasant today.  Wants to restart some of his home medications which may have caused his respiratory issues and confusion.  I explained to him and his wife that he is on polypharmacy at home and he expressed understanding.  He is still requiring oxygen.  Still coughing and wheezing but slightly better     ROS: No chest pain, abdominal pain    Vitals:  Vitals:    12/08/22 1144 12/08/22 1306 12/08/22 1653 12/08/22 1657   BP:  (!) 152/106     BP Location:  Right arm     Patient Position:  Sitting     Pulse: 92 92 87 82   Resp: 20 18 20 20   Temp:  98 °F (36.7 °C)     TempSrc:  Oral     SpO2: (!) 68% 94% 94% 98%   Weight:       Height:               Body mass index is 28.94 kg/m².    Intake/Output Summary (Last 24 hours) at 12/8/2022 1707  Last data filed at 12/8/2022 1306  Gross per 24 hour   Intake 120 ml   Output 1725 ml   Net -1605 ml       Exam:  GEN:  No distress  Alert, oriented x 3.   LUNGS: Scattered wheezes bilat, but sounds better today, no use of accessory muscles  CV:  Normal S1S2, without murmur, 1+ leg edema  ABD:  Non tender, no enlarged liver or masses      Scheduled meds:  cefTRIAXone, 1 g, Intravenous, Q24H  finasteride, 5 mg, Oral, QAM  ipratropium-albuterol, 3 mL, Nebulization, 4x Daily - RT  lisinopril, 10 mg, Oral, Once  [START ON 12/9/2022] lisinopril, 20 mg, Oral, QAM      IV meds:                           Data Review:   I reviewed the patient's medications and new clinical results.    COVID19   Date Value Ref Range Status   12/06/2022 Not Detected Not Detected - Ref. Range Final         Lab Results   Component Value Date    CALCIUM 9.6 12/08/2022     Results from last 7 days   Lab Units 12/08/22  0735 12/07/22  0436 12/06/22  0918 12/06/22  0528  12/06/22  0439   SODIUM mmol/L 140 143 141  --  142   POTASSIUM mmol/L 4.3 4.5 5.2  --  4.8   CHLORIDE mmol/L 98 102 104  --  106   CO2 mmol/L 29.2* 30.8* 29.3*  --  26.2   BUN mg/dL 15 21 22  --  19   CREATININE mg/dL 0.77 0.84 1.39*  --  1.15   CALCIUM mg/dL 9.6 9.0 8.8  --  8.6   BILIRUBIN mg/dL  --   --   --   --  0.4   ALK PHOS U/L  --   --   --   --  74   ALT (SGPT) U/L  --   --   --   --  29   AST (SGOT) U/L  --   --   --   --  33   GLUCOSE mg/dL 94 92 117*  --  130*   WBC 10*3/mm3 9.16 7.81 8.06  --  8.56   HEMOGLOBIN g/dL 14.6 12.8* 12.7*  --  13.8   PLATELETS 10*3/mm3 169 185 195  --  170   INR   --   --   --  1.06  --    PROBNP pg/mL  --   --   --   --  2,489.0*   PROCALCITONIN ng/mL 0.06  --   --   --   --      Results from last 7 days   Lab Units 12/06/22  0739 12/06/22  0737 12/06/22  0456   BLOODCX  No growth at 2 days No growth at 2 days  --    URINECX   --   --  >100,000 CFU/mL Escherichia coli*         Results from last 7 days   Lab Units 12/06/22  0918 12/06/22  0439   CK TOTAL U/L  --  116   TROPONIN T ng/mL 0.208* 0.159*     Results from last 7 days   Lab Units 12/06/22  1645 12/06/22  1108 12/06/22  0506   PH, ARTERIAL pH units 7.311* 7.261* 7.266*   PCO2, ARTERIAL mm Hg 64.7* 67.2* 67.7*   PO2 ART mm Hg 78.9* 80.0 87.9   FLOW RATE lpm  --   --  3.5   MODALITY  BiPap BiPap Cannula   O2 SATURATION CALC % 93.8 93.1 94.8       Estimated Creatinine Clearance: 92.7 mL/min (by C-G formula based on SCr of 0.77 mg/dL).      ASSESSMENT:   Fall in home, initial encounter    Hypertension    Acute metabolic encephalopathy    Elevated troponin    Acute respiratory failure with hypercapnia (HCC)    COPD (chronic obstructive pulmonary disease) (HCC)    Elevated brain natriuretic peptide (BNP) level    UTI (urinary tract infection)  Elevated troponin  Urinary tract infection  Polypharmacy  Cor pulmonale, pulmonary hypertension, chronic      PLAN:  He is slowly improving.  His confusion has improved.  I will  restart some of his home CNS medications but not all of them at the same dosages.  I explained to him and his wife that he was on polypharmacy at home and they expressed understanding.  These medications can suppress his breathing and due to his respiratory failure and COPD, could cause hypercapnic state with confusion.  Appreciate input from Dr. So.  Discussed with her.  They will be following patient.  CT chest with angiogram ruled out pulmonary embolism.  His pulmonary hypertension is likely due to severe lung disease.  He will need oxygen therapy indefinitely.  We will do walking oximetry test prior to discharge.  Continue on Rocephin for E. coli urinary tract infection.  Stop tomorrow after 72 hours of Rocephin.  UA already looks normal.  Anticipate discharge home tomorrow.  He will have to follow-up with cardiology and pulmonology.  This was explained clearly to him and his wife today.          Piero Green MD  12/8/2022

## 2022-12-09 LAB
ANION GAP SERPL CALCULATED.3IONS-SCNC: 11.4 MMOL/L (ref 5–15)
BASOPHILS # BLD AUTO: 0.05 10*3/MM3 (ref 0–0.2)
BASOPHILS NFR BLD AUTO: 0.7 % (ref 0–1.5)
BUN SERPL-MCNC: 9 MG/DL (ref 8–23)
BUN/CREAT SERPL: 13.2 (ref 7–25)
CALCIUM SPEC-SCNC: 9.7 MG/DL (ref 8.6–10.5)
CHLORIDE SERPL-SCNC: 97 MMOL/L (ref 98–107)
CO2 SERPL-SCNC: 28.6 MMOL/L (ref 22–29)
CREAT SERPL-MCNC: 0.68 MG/DL (ref 0.76–1.27)
DEPRECATED RDW RBC AUTO: 38.1 FL (ref 37–54)
EGFRCR SERPLBLD CKD-EPI 2021: 98.8 ML/MIN/1.73
EOSINOPHIL # BLD AUTO: 0.19 10*3/MM3 (ref 0–0.4)
EOSINOPHIL NFR BLD AUTO: 2.5 % (ref 0.3–6.2)
ERYTHROCYTE [DISTWIDTH] IN BLOOD BY AUTOMATED COUNT: 11.6 % (ref 12.3–15.4)
GLUCOSE BLDC GLUCOMTR-MCNC: 100 MG/DL (ref 70–130)
GLUCOSE BLDC GLUCOMTR-MCNC: 104 MG/DL (ref 70–130)
GLUCOSE BLDC GLUCOMTR-MCNC: 125 MG/DL (ref 70–130)
GLUCOSE BLDC GLUCOMTR-MCNC: 85 MG/DL (ref 70–130)
GLUCOSE SERPL-MCNC: 95 MG/DL (ref 65–99)
HCT VFR BLD AUTO: 48.5 % (ref 37.5–51)
HGB BLD-MCNC: 15.9 G/DL (ref 13–17.7)
IMM GRANULOCYTES # BLD AUTO: 0.05 10*3/MM3 (ref 0–0.05)
IMM GRANULOCYTES NFR BLD AUTO: 0.7 % (ref 0–0.5)
LYMPHOCYTES # BLD AUTO: 1.28 10*3/MM3 (ref 0.7–3.1)
LYMPHOCYTES NFR BLD AUTO: 16.8 % (ref 19.6–45.3)
MCH RBC QN AUTO: 30 PG (ref 26.6–33)
MCHC RBC AUTO-ENTMCNC: 32.8 G/DL (ref 31.5–35.7)
MCV RBC AUTO: 91.5 FL (ref 79–97)
MONOCYTES # BLD AUTO: 0.7 10*3/MM3 (ref 0.1–0.9)
MONOCYTES NFR BLD AUTO: 9.2 % (ref 5–12)
NEUTROPHILS NFR BLD AUTO: 5.35 10*3/MM3 (ref 1.7–7)
NEUTROPHILS NFR BLD AUTO: 70.1 % (ref 42.7–76)
NRBC BLD AUTO-RTO: 0 /100 WBC (ref 0–0.2)
PLATELET # BLD AUTO: 223 10*3/MM3 (ref 140–450)
PMV BLD AUTO: 10 FL (ref 6–12)
POTASSIUM SERPL-SCNC: 3.9 MMOL/L (ref 3.5–5.2)
RBC # BLD AUTO: 5.3 10*6/MM3 (ref 4.14–5.8)
SODIUM SERPL-SCNC: 137 MMOL/L (ref 136–145)
WBC NRBC COR # BLD: 7.62 10*3/MM3 (ref 3.4–10.8)

## 2022-12-09 PROCEDURE — 82962 GLUCOSE BLOOD TEST: CPT

## 2022-12-09 PROCEDURE — 94761 N-INVAS EAR/PLS OXIMETRY MLT: CPT

## 2022-12-09 PROCEDURE — 94799 UNLISTED PULMONARY SVC/PX: CPT

## 2022-12-09 PROCEDURE — 94664 DEMO&/EVAL PT USE INHALER: CPT

## 2022-12-09 PROCEDURE — 85025 COMPLETE CBC W/AUTO DIFF WBC: CPT | Performed by: INTERNAL MEDICINE

## 2022-12-09 PROCEDURE — 80048 BASIC METABOLIC PNL TOTAL CA: CPT | Performed by: INTERNAL MEDICINE

## 2022-12-09 PROCEDURE — 99232 SBSQ HOSP IP/OBS MODERATE 35: CPT | Performed by: NURSE PRACTITIONER

## 2022-12-09 PROCEDURE — 25010000002 CEFTRIAXONE PER 250 MG: Performed by: INTERNAL MEDICINE

## 2022-12-09 RX ORDER — ASPIRIN 81 MG/1
81 TABLET ORAL DAILY
Status: DISCONTINUED | OUTPATIENT
Start: 2022-12-09 | End: 2022-12-10 | Stop reason: HOSPADM

## 2022-12-09 RX ORDER — CARVEDILOL 3.12 MG/1
3.12 TABLET ORAL 2 TIMES DAILY WITH MEALS
Status: DISCONTINUED | OUTPATIENT
Start: 2022-12-09 | End: 2022-12-10 | Stop reason: HOSPADM

## 2022-12-09 RX ORDER — ALBUTEROL SULFATE 2.5 MG/3ML
2.5 SOLUTION RESPIRATORY (INHALATION) EVERY 6 HOURS PRN
Status: DISCONTINUED | OUTPATIENT
Start: 2022-12-09 | End: 2022-12-10

## 2022-12-09 RX ADMIN — GABAPENTIN 300 MG: 300 CAPSULE ORAL at 22:04

## 2022-12-09 RX ADMIN — IPRATROPIUM BROMIDE AND ALBUTEROL SULFATE 3 ML: 2.5; .5 SOLUTION RESPIRATORY (INHALATION) at 12:45

## 2022-12-09 RX ADMIN — MIRTAZAPINE 15 MG: 15 TABLET, ORALLY DISINTEGRATING ORAL at 22:04

## 2022-12-09 RX ADMIN — TRAZODONE HYDROCHLORIDE 25 MG: 50 TABLET ORAL at 22:04

## 2022-12-09 RX ADMIN — GABAPENTIN 300 MG: 300 CAPSULE ORAL at 09:56

## 2022-12-09 RX ADMIN — CEFTRIAXONE SODIUM 1 G: 1 INJECTION, POWDER, FOR SOLUTION INTRAMUSCULAR; INTRAVENOUS at 00:23

## 2022-12-09 RX ADMIN — LISINOPRIL 20 MG: 20 TABLET ORAL at 07:02

## 2022-12-09 RX ADMIN — ASPIRIN 81 MG: 81 TABLET, COATED ORAL at 16:06

## 2022-12-09 RX ADMIN — CARVEDILOL 3.12 MG: 3.12 TABLET, FILM COATED ORAL at 16:06

## 2022-12-09 RX ADMIN — IPRATROPIUM BROMIDE AND ALBUTEROL SULFATE 3 ML: 2.5; .5 SOLUTION RESPIRATORY (INHALATION) at 09:03

## 2022-12-09 RX ADMIN — DULOXETINE HYDROCHLORIDE 30 MG: 30 CAPSULE, DELAYED RELEASE ORAL at 09:56

## 2022-12-09 RX ADMIN — FINASTERIDE 5 MG: 5 TABLET, FILM COATED ORAL at 07:03

## 2022-12-09 NOTE — CASE MANAGEMENT/SOCIAL WORK
Continued Stay Note  [facility]     Patient Name: Russell Christine  MRN: [id number]  Today's Date: 12/9/2022    Admit Date: 12/6/2022    Plan: Plan home with spouse.  FIOR Edge RN   Discharge Plan     Row Name 12/09/22 1051       Plan    Plan Plan home with spouse.  FIOR Edge RN    Patient/Family in Agreement with Plan yes    Plan Comments Spoke with pt at bedside.  Pt denies any discharge needs.  Pt's spouse can assist pt at home if needed,  Plan home with spouse.  FIOR Edge RN               Discharge Codes    No documentation.               Expected Discharge Date and Time     Expected Discharge Date Expected Discharge Time    Dec 13, 2022             Jolene Edge RN

## 2022-12-09 NOTE — PROGRESS NOTES
"    Patient Name: Russell Christine  :1950  72 y.o.      Patient Care Team:  Vicente Mathews MD as PCP - General (Internal Medicine)    Chief Complaint: follow up elevated troponin     Interval History: sitting up in the chair. Doing better today. BP elevated. No wheezing. Does not feel short of breath. Denies CP.     Objective   Vital Signs  Temp:  [97.3 °F (36.3 °C)-98.1 °F (36.7 °C)] 98.1 °F (36.7 °C)  Heart Rate:  [82-96] 88  Resp:  [18-20] 18  BP: (136-194)/(106-123) 194/115    Intake/Output Summary (Last 24 hours) at 2022 1130  Last data filed at 2022 0837  Gross per 24 hour   Intake 240 ml   Output 1100 ml   Net -860 ml     Flowsheet Rows    Flowsheet Row First Filed Value   Admission Height 172.7 cm (68\") Documented at 2022 0430   Admission Weight 99.3 kg (219 lb) Documented at 2022 0430          Physical Exam:   General Appearance:    Alert, restless, in mild distress   Lungs:     Clear to auscultation.  Normal respiratory effort and rate.      Heart:    Regular rhythm and normal rate, normal S1 and S2, no murmurs, gallops or rubs.     Chest Wall:    No abnormalities observed   Abdomen:     Soft, nontender, positive bowel sounds.     Extremities:   no cyanosis, clubbing or edema.  No marked joint deformities.  Adequate musculoskeletal strength.       Results Review:    Results from last 7 days   Lab Units 22  0628   SODIUM mmol/L 137   POTASSIUM mmol/L 3.9   CHLORIDE mmol/L 97*   CO2 mmol/L 28.6   BUN mg/dL 9   CREATININE mg/dL 0.68*   GLUCOSE mg/dL 95   CALCIUM mg/dL 9.7     Results from last 7 days   Lab Units 22  0918 22  0439   CK TOTAL U/L  --  116   TROPONIN T ng/mL 0.208* 0.159*     Results from last 7 days   Lab Units 22  0628   WBC 10*3/mm3 7.62   HEMOGLOBIN g/dL 15.9   HEMATOCRIT % 48.5   PLATELETS 10*3/mm3 223     Results from last 7 days   Lab Units 22  0528   INR  1.06   APTT seconds 32.8                       Medication Review: "   DULoxetine, 30 mg, Oral, Daily With Breakfast  finasteride, 5 mg, Oral, QAM  gabapentin, 300 mg, Oral, Q12H  ipratropium-albuterol, 3 mL, Nebulization, 4x Daily - RT  lisinopril, 20 mg, Oral, QAM  mirtazapine, 15 mg, Oral, Nightly  traZODone, 25 mg, Oral, Nightly              Assessment & Plan   1. Fall at home  2. Metabolic encephalopathy  3. Acute hypoxic and hypercapnic respiratory failure - requiring 1 liter to maintain 88%  4. COPD  5. Urinary tract infection  6. Tobacco use  7. Alcohol use   8. Elevated Tn/proBNP    Echocardiogram with normal left ventricular systolic function and wall motion. CTA ruled out pulmonary embolism. Right ventricular dilation and hypokinesis likely secondary to chronic lung disease.     BP is elevated. No wheezing. Will add beta blocker.     No clear evidence of acute coronary syndrome. He continues to deny chest pain. At least mild coronary artery calcification noted on CT chest. Add ASA. Will follow. Etiology of elevated trop unclear.     MARIE Bright  [facility]  12/09/22  11:30 EST

## 2022-12-09 NOTE — PROGRESS NOTES
Dr. MARITZA Green    [address]        Patient ID:  Name:  Russell Christine  MRN:  [id number]  1950  72 y.o.  male            CC/Reason for visit: Fall, head injury     Interval hx: The patient is  feeling better.  Still weak and unsteady on his feet, using 4-prong rolling walker for unsteady gait.  Still having some cough but overall feels better.  Still requiring supplemental oxygen but now at 3 L via nasal cannula and saturating 100%.     ROS: No chest pain, abdominal pain    Vitals:  Vitals:    12/09/22 0917 12/09/22 1230 12/09/22 1245 12/09/22 1253   BP:  (!) 157/101     BP Location:  Left arm     Patient Position:  Sitting     Pulse: 88 86 99 94   Resp: 18 18 18 18   Temp:  98 °F (36.7 °C)     TempSrc:  Oral     SpO2: 100% 96% 98% 100%   Weight:       Height:               Body mass index is 31.19 kg/m².    Intake/Output Summary (Last 24 hours) at 12/9/2022 1453  Last data filed at 12/9/2022 0837  Gross per 24 hour   Intake 240 ml   Output 800 ml   Net -560 ml       Exam:  GEN:  No distress  Alert, oriented x 3.   LUNGS: Some coarse breath sounds with no wheezing today bilat, no use of accessory muscles  CV:  Normal S1S2, without murmur, no edema  ABD:  Non tender, no enlarged liver or masses      Scheduled meds:  aspirin, 81 mg, Oral, Daily  carvedilol, 3.125 mg, Oral, BID With Meals  DULoxetine, 30 mg, Oral, Daily With Breakfast  finasteride, 5 mg, Oral, QAM  gabapentin, 300 mg, Oral, Q12H  ipratropium-albuterol, 3 mL, Nebulization, 4x Daily - RT  lisinopril, 20 mg, Oral, QAM  mirtazapine, 15 mg, Oral, Nightly  traZODone, 25 mg, Oral, Nightly      IV meds:                           Data Review:   I reviewed the patient's medications and new clinical results.    COVID19   Date Value Ref Range Status   12/06/2022 Not Detected Not Detected - Ref. Range Final         Lab Results   Component Value Date    CALCIUM 9.7 12/09/2022     Results from last 7 days   Lab Units 12/09/22  0628  12/08/22  0735 12/07/22  0436 12/06/22  0918 12/06/22  0528 12/06/22  0439   SODIUM mmol/L 137 140 143   < >  --  142   POTASSIUM mmol/L 3.9 4.3 4.5   < >  --  4.8   CHLORIDE mmol/L 97* 98 102   < >  --  106   CO2 mmol/L 28.6 29.2* 30.8*   < >  --  26.2   BUN mg/dL 9 15 21   < >  --  19   CREATININE mg/dL 0.68* 0.77 0.84   < >  --  1.15   CALCIUM mg/dL 9.7 9.6 9.0   < >  --  8.6   BILIRUBIN mg/dL  --   --   --   --   --  0.4   ALK PHOS U/L  --   --   --   --   --  74   ALT (SGPT) U/L  --   --   --   --   --  29   AST (SGOT) U/L  --   --   --   --   --  33   GLUCOSE mg/dL 95 94 92   < >  --  130*   WBC 10*3/mm3 7.62 9.16 7.81   < >  --  8.56   HEMOGLOBIN g/dL 15.9 14.6 12.8*   < >  --  13.8   PLATELETS 10*3/mm3 223 169 185   < >  --  170   INR   --   --   --   --  1.06  --    PROBNP pg/mL  --   --   --   --   --  2,489.0*   PROCALCITONIN ng/mL  --  0.06  --   --   --   --     < > = values in this interval not displayed.     Results from last 7 days   Lab Units 12/06/22  0739 12/06/22  0737 12/06/22  0456   BLOODCX  No growth at 3 days No growth at 3 days  --    URINECX   --   --  >100,000 CFU/mL Escherichia coli*         Results from last 7 days   Lab Units 12/06/22  0918 12/06/22  0439   CK TOTAL U/L  --  116   TROPONIN T ng/mL 0.208* 0.159*     Results from last 7 days   Lab Units 12/06/22  1645 12/06/22  1108 12/06/22  0506   PH, ARTERIAL pH units 7.311* 7.261* 7.266*   PCO2, ARTERIAL mm Hg 64.7* 67.2* 67.7*   PO2 ART mm Hg 78.9* 80.0 87.9   FLOW RATE lpm  --   --  3.5   MODALITY  BiPap BiPap Cannula   O2 SATURATION CALC % 93.8 93.1 94.8             ASSESSMENT:   Fall in home, initial encounter    Hypertension    Acute metabolic encephalopathy    Elevated troponin    Acute respiratory failure with hypercapnia (HCC)    COPD (chronic obstructive pulmonary disease) (HCC)    Elevated brain natriuretic peptide (BNP) level    UTI (urinary tract infection)  Elevated troponin  Urinary tract infection  Polypharmacy  Cor  pulmonale, pulmonary hypertension, chronic      PLAN:  Continues to improve.  Urine culture from the sixth was positive for E. coli and all other cultures were negative.  On admission procalcitonin was not drawn but yesterday it was normal.  He did not have any leukocytosis.  I have stopped Rocephin after 3 days.  There are no clinical signs or symptoms of persistent or ongoing infection.  He is quite weak and debilitated and needs some physical therapy evaluation.  The plan is probably discharging him home tomorrow.  Continue bronchodilators for COPD.  At home he takes albuterol.  He will have to be discharged on Tiotropium as well as albuterol.        Piero Green MD  12/9/2022

## 2022-12-09 NOTE — PLAN OF CARE
Goal Outcome Evaluation:      Pt. Confuse and disoriented to time and situation,sr on monitor, 2L o2 nc, keep pulling 02 and monitor and get out of bed, try to get out of room, notify dr about pt agitation and restless. Administered cough meds x 1. Spouse at bedside, elevated BP, no ss of acute distress noted.

## 2022-12-10 ENCOUNTER — READMISSION MANAGEMENT (OUTPATIENT)
Dept: CALL CENTER | Facility: HOSPITAL | Age: 72
End: 2022-12-10

## 2022-12-10 VITALS
HEART RATE: 90 BPM | HEIGHT: 68 IN | SYSTOLIC BLOOD PRESSURE: 154 MMHG | OXYGEN SATURATION: 96 % | DIASTOLIC BLOOD PRESSURE: 99 MMHG | BODY MASS INDEX: 30.96 KG/M2 | WEIGHT: 204.3 LBS | TEMPERATURE: 97.6 F | RESPIRATION RATE: 18 BRPM

## 2022-12-10 LAB
ANION GAP SERPL CALCULATED.3IONS-SCNC: 8.9 MMOL/L (ref 5–15)
BASOPHILS # BLD AUTO: 0.06 10*3/MM3 (ref 0–0.2)
BASOPHILS NFR BLD AUTO: 0.7 % (ref 0–1.5)
BUN SERPL-MCNC: 11 MG/DL (ref 8–23)
BUN/CREAT SERPL: 13.9 (ref 7–25)
CALCIUM SPEC-SCNC: 9.3 MG/DL (ref 8.6–10.5)
CHLORIDE SERPL-SCNC: 98 MMOL/L (ref 98–107)
CO2 SERPL-SCNC: 32.1 MMOL/L (ref 22–29)
CREAT SERPL-MCNC: 0.79 MG/DL (ref 0.76–1.27)
DEPRECATED RDW RBC AUTO: 40.2 FL (ref 37–54)
EGFRCR SERPLBLD CKD-EPI 2021: 94.4 ML/MIN/1.73
EOSINOPHIL # BLD AUTO: 0.19 10*3/MM3 (ref 0–0.4)
EOSINOPHIL NFR BLD AUTO: 2.3 % (ref 0.3–6.2)
ERYTHROCYTE [DISTWIDTH] IN BLOOD BY AUTOMATED COUNT: 11.8 % (ref 12.3–15.4)
GLUCOSE BLDC GLUCOMTR-MCNC: 94 MG/DL (ref 70–130)
GLUCOSE BLDC GLUCOMTR-MCNC: 98 MG/DL (ref 70–130)
GLUCOSE SERPL-MCNC: 91 MG/DL (ref 65–99)
HCT VFR BLD AUTO: 45.3 % (ref 37.5–51)
HGB BLD-MCNC: 14.8 G/DL (ref 13–17.7)
IMM GRANULOCYTES # BLD AUTO: 0.07 10*3/MM3 (ref 0–0.05)
IMM GRANULOCYTES NFR BLD AUTO: 0.9 % (ref 0–0.5)
LYMPHOCYTES # BLD AUTO: 1.52 10*3/MM3 (ref 0.7–3.1)
LYMPHOCYTES NFR BLD AUTO: 18.7 % (ref 19.6–45.3)
MCH RBC QN AUTO: 30.6 PG (ref 26.6–33)
MCHC RBC AUTO-ENTMCNC: 32.7 G/DL (ref 31.5–35.7)
MCV RBC AUTO: 93.8 FL (ref 79–97)
MONOCYTES # BLD AUTO: 0.9 10*3/MM3 (ref 0.1–0.9)
MONOCYTES NFR BLD AUTO: 11.1 % (ref 5–12)
NEUTROPHILS NFR BLD AUTO: 5.37 10*3/MM3 (ref 1.7–7)
NEUTROPHILS NFR BLD AUTO: 66.3 % (ref 42.7–76)
NRBC BLD AUTO-RTO: 0 /100 WBC (ref 0–0.2)
PLATELET # BLD AUTO: 233 10*3/MM3 (ref 140–450)
PMV BLD AUTO: 9.5 FL (ref 6–12)
POTASSIUM SERPL-SCNC: 3.8 MMOL/L (ref 3.5–5.2)
RBC # BLD AUTO: 4.83 10*6/MM3 (ref 4.14–5.8)
SODIUM SERPL-SCNC: 139 MMOL/L (ref 136–145)
WBC NRBC COR # BLD: 8.11 10*3/MM3 (ref 3.4–10.8)

## 2022-12-10 PROCEDURE — 80048 BASIC METABOLIC PNL TOTAL CA: CPT | Performed by: INTERNAL MEDICINE

## 2022-12-10 PROCEDURE — 85025 COMPLETE CBC W/AUTO DIFF WBC: CPT | Performed by: INTERNAL MEDICINE

## 2022-12-10 PROCEDURE — 82962 GLUCOSE BLOOD TEST: CPT

## 2022-12-10 PROCEDURE — 36415 COLL VENOUS BLD VENIPUNCTURE: CPT | Performed by: INTERNAL MEDICINE

## 2022-12-10 RX ORDER — MIRTAZAPINE 15 MG/1
45 TABLET, ORALLY DISINTEGRATING ORAL NIGHTLY
Qty: 30 TABLET | Refills: 3 | Status: SHIPPED | OUTPATIENT
Start: 2022-12-10 | End: 2022-12-29 | Stop reason: ALTCHOICE

## 2022-12-10 RX ORDER — TRAZODONE HYDROCHLORIDE 50 MG/1
25 TABLET ORAL NIGHTLY
Qty: 30 TABLET | Refills: 3 | Status: SHIPPED | OUTPATIENT
Start: 2022-12-10 | End: 2023-03-14

## 2022-12-10 RX ORDER — GABAPENTIN 600 MG/1
300 TABLET ORAL 2 TIMES DAILY
Qty: 60 TABLET | Refills: 3 | Status: SHIPPED | OUTPATIENT
Start: 2022-12-10

## 2022-12-10 RX ORDER — CARVEDILOL 3.12 MG/1
3.12 TABLET ORAL 2 TIMES DAILY WITH MEALS
Qty: 60 TABLET | Refills: 3 | Status: SHIPPED | OUTPATIENT
Start: 2022-12-10

## 2022-12-10 RX ORDER — DULOXETIN HYDROCHLORIDE 30 MG/1
60 CAPSULE, DELAYED RELEASE ORAL
Qty: 30 CAPSULE | Refills: 3 | Status: SHIPPED | OUTPATIENT
Start: 2022-12-10

## 2022-12-10 RX ADMIN — ASPIRIN 81 MG: 81 TABLET, COATED ORAL at 08:38

## 2022-12-10 RX ADMIN — LISINOPRIL 20 MG: 20 TABLET ORAL at 05:56

## 2022-12-10 RX ADMIN — DULOXETINE HYDROCHLORIDE 30 MG: 30 CAPSULE, DELAYED RELEASE ORAL at 08:38

## 2022-12-10 RX ADMIN — CARVEDILOL 3.12 MG: 3.12 TABLET, FILM COATED ORAL at 08:38

## 2022-12-10 RX ADMIN — FINASTERIDE 5 MG: 5 TABLET, FILM COATED ORAL at 05:56

## 2022-12-10 RX ADMIN — GABAPENTIN 300 MG: 300 CAPSULE ORAL at 08:38

## 2022-12-10 NOTE — PLAN OF CARE
Goal Outcome Evaluation:   Patient alert follows commands wife at bedside, patient has not slept all shift, no c/o pain or nausea noted. No acute distress noted will continue to monitor

## 2022-12-10 NOTE — DISCHARGE SUMMARY
Patient Identification:  Name: Russell Christine  Age: 72 y.o.  Sex: male  :  1950  MRN: [id number]  Primary Care Physician: Vicente Mathews MD    Admit date: 2022  Discharge date and time: December 10, 2022  Discharged Condition: good    Discharge Diagnoses:   Fall in home, initial encounter    Hypertension    Acute metabolic encephalopathy    Elevated troponin    Acute respiratory failure with hypercapnia (HCC)    COPD exacerbation    Elevated brain natriuretic peptide (BNP) level    UTI (urinary tract infection)  Elevated troponin  Urinary tract infection  Polypharmacy  Cor pulmonale, pulmonary hypertension, chronic       Hospital Course: Rusesll Christine presented to [facility] due to a fall at home with head injury.  He was admitted to the intensive care unit.  It was determined that he was on polypharmacy at home so many of these medications were stopped and dosages were reduced.  He was also wheezing on admission and diagnosed with COPD exacerbation.  He also had acute respiratory failure.  Over the ensuing days, with proper bronchodilators and oxygen his respiratory status improved significantly.  His urinary Alysis suggested infection and urinary culture confirmed E. coli growth.  He was treated for urinary tract infection with Rocephin IV for 3 days.  There was no evidence of pyelonephritis therefore antibiotics were stopped after 3 days.  He was diagnosed with encephalopathy on admission based on a combination of hypercapnia, UTI and possible polypharmacy at home.  Over the ensuing days his encephalopathy continued to improve.  Cardiology saw the patient and performed echocardiogram and determined he had low ejection fraction of his right ventricle with significant dilatation suggesting pulmonary hypertension.  CT scan was negative for pulmonary embolism.  It was determined he has cor pulmonale with pulm hypertension due to severe advanced lung disease.  He needs  oxygen, 2 L continuous via nasal cannula at all times.  He was started on lisinopril and carvedilol per cardiology for his new findings of his echocardiogram.  He has now maximally benefited from acute inpatient care and is ready for discharge home.  He was advised to quit smoking.  He needs to follow-up with his primary care physician within the next week as well as with cardiology within the next 30 days.      Consults:   IP CONSULT TO PULMONOLOGY  IP CONSULT TO CARDIOLOGY  IP CONSULT TO CASE MANAGEMENT   IP CONSULT TO CARDIOLOGY    Significant Diagnostic Studies:   Imaging Results (Most Recent)     Procedure Component Value Units Date/Time    CT Angiogram Chest [[id number]] Collected: 12/07/22 1917     Updated: 12/07/22 1941    Narrative:      CT ANGIOGRAM OF THE CHEST. MULTIPLE CORONAL, SAGITTAL, AND 3-D  RECONSTRUCTIONS.     HISTORY: Hypoxemia, hypercapnia     TECHNIQUE: Radiation dose reduction techniques were utilized, including  automated exposure control and exposure modulation based on body size.   CT angiogram of the chest was performed. Multiple coronal, sagittal, and  3-D reconstruction images were obtained.      COMPARISON:None     FINDINGS:      No hilar, mediastinal or axillary adenopathy is present by size  criteria. Trace pericardial effusion is present. At least mild coronary  calcification is present.     No findings of pulmonary embolism are present. There is moderate to  severe emphysema. No pulmonary consolidation, pleural effusion or  pneumothorax is present. There appears to be subtle centrilobular  nodularity within the left lower lobe more subtle centrilobular  nodularity is present within the right lung base. Sub-6 mm pulmonary  nodule within the right upper lobe.     Right shoulder arthroplasty is incompletely visualized. Cervical spinal  hardware is also incompletely seen.       Impression:      1.  No findings of pulmonary embolism.  2.  Subtle centrilobular nodularity is  present within the bilateral lung  bases, left greater then right. Primary differential considerations  include but are not limited to nonspecific bronchiolitis versus  smoking-related changes and correlation with patient history is  recommended.  3.  Sub-6 mm pulmonary nodule within the right upper lobe. Follow-up  chest CT in 12 months is recommended.  4.  Moderate to severe emphysema.  5.  Other findings as above     This report was finalized on 12/7/2022 7:38 PM by Dr. Jagdish Cota M.D.       CT Head Without Contrast [[id number]] Collected: 12/06/22 0811     Updated: 12/07/22 0853    Narrative:      EMERGENCY NONCONTRAST HEAD CT AND NONCONTRAST CERVICAL SPINE CT  12/06/2022     CLINICAL HISTORY: Patient fell, hit back of his head, headache and neck  pain.     HEAD CT TECHNIQUE: Spiral CT images were obtained from the base of the  skull to the vertex without intravenous contrast. The images were  reformatted and are submitted in 3 mm thick axial, sagittal and coronal  CT sections with brain algorithm and 2 mm thick axial CT sections with  high-resolution bone algorithm.     This is correlated to a remote prior noncontrast head CT from [facility] on 11/02/2004, 18 years ago.     FINDINGS: This patient has had a remote prior 7.5 x 7 cm lateral left  frontotemporoparietal craniotomy. There is a low-density fluid density  resection cavity extending from the anterior inferior medial left  frontal lobe extending into the posterior two-thirds of the left gyrus  rectus, extends laterally into the left anterior perforated substance  and inferior left frontal lobe and into the inferior aspect of the left  frontotemporal junction. The well-circumscribed fluid density cavity  measures 4.4 x 4.4 x 2.6 cm in anterior posterior, medial lateral and  craniocaudal dimension. It may communicate with the inferior aspect of  the frontal horn of the left lateral ventricle as well as the anterior  left Sylvian fissure  and is unchanged dating back to head CT 18 years  ago on 11/02/2004. There is a chronic epidural fluid collection  overlying the anterior left frontal lobe measuring 4-5 mm in anterior  posterior thickness. There is some mild patchy low-density in the  periventricular white matter consistent with mild small vessel disease.  The ventricles are normal in size. I see no mass effect, no midline  shift. No acute intracranial hemorrhage is identified. No acute skull  fracture is identified. The paranasal sinuses and the mastoid air cells  and the middle ear cavities are clear.       Impression:      1. No acute intracranial abnormality is identified with no significant  change dating all the way back to a head CT 18 years ago on 11/02/2004.  2. Patient has had a remote prior 7.5 x 7 cm lateral left  frontotemporoparietal craniotomy and has a well-circumscribed CSF  density resection cavity extending from the anterior inferior medial  left frontal lobe laterally to the anterior margin of the left Sylvian  fissure and may communicate with the anterior left Sylvian fissure and  appears to communicate with the inferior aspect of the frontal horn of  the left lateral ventricle and correlation with prior surgical history  is suggested. The remainder of the head CT is within normal limits with  no acute skull fracture or intracranial hemorrhage identified.     CERVICAL SPINE CT TECHNIQUE: Spiral CT images were obtained from the  skull base down to the midbody of the T3 thoracic level and the images  were reformatted and submitted in 2 mm thick axial and sagittal CT  sections with soft tissue algorithm and 1 mm thick axial, sagittal and  coronal CT sections with high-resolution bone algorithm.     There are no prior cervical spine imaging studies from [facility] for comparison.     FINDINGS: The atlantooccipital articulation is within normal limits.     At C1-2 there are arthritic changes at the atlantodental  interval with  narrowing of the interval and marginal spurring off the anterior ring of  C1 and the odontoid. Otherwise the C1-2 level is normal in appearance.     At C2-3 the posterior disc margin, facets and uncovertebral joints are  normal with no canal or foraminal narrowing.     At C3-4 there is mild left and there is moderate right facet overgrowth.  There is reversal of the normal cervical lordosis centered at the C3-4  level. There is a 1 mm anterolisthesis of C3 on C4, posterior endplate  spurring, mild canal narrowing. The right facet spurs extend into the  right neural foramen and there is moderate-to-severe right bony  foraminal narrowing. There is no left foraminal narrowing at C3-4.     Patient has extensive prior cervical spine surgery from C4 to C7. There  are bilateral rods bridging the posterior elements from C4 to C7  anchored by bilateral articular pillar screws at C4, C5, C6 and C7.  There is some partial bony fusion across the endplates at C4-5, C5-6 and  C6-7. I see no residual canal or foraminal narrowing from C4 to C7.     At C7-T1 there is mild-to-moderate bilateral facet overgrowth. There is  disc space narrowing. There are degenerative endplate changes, posterior  endplate spurring but only minimal if any canal narrowing. There is some  uncovertebral joint spurring and there is minimal bilateral foraminal  narrowing.     No acute fracture is seen in the cervical spine.     IMPRESSION:  1. No acute fracture is seen in the cervical spine.  2. Patient has had extensive prior cervical spine surgery from C4 to C7,  bilateral posterior bridging rods anchored by bilateral articular pillar  screws from C4 to C7 with no residual canal or foraminal narrowing from  C4 to C7. There is slight reversal of the normal cervical lordosis  centered at C3-4 where there is mild left and there is  moderate-to-severe right facet overgrowth. Right facet spurs moderately  to severely narrow the right neural  foramen at C3-4 and there is  reversal of the cervical lordosis at C3-4 with posterior endplate  spurring and mild canal narrowing at C3-4. There is also disc space  narrowing and degenerative endplate changes at C7-T1 with posterior  endplate spurs and uncovertebral joint spurs contributing to minimal  canal and foraminal narrowing at C7-T1. The remainder of the cervical  spine CT is unremarkable.     Radiation dose reduction techniques were utilized, including automated  exposure control and exposure modulation based on body size.     This report was finalized on 12/7/2022 8:50 AM by Dr. Duane Wilkinson M.D.       CT Cervical Spine Without Contrast [[id number]] Collected: 12/06/22 0811     Updated: 12/07/22 0853    Narrative:      EMERGENCY NONCONTRAST HEAD CT AND NONCONTRAST CERVICAL SPINE CT  12/06/2022     CLINICAL HISTORY: Patient fell, hit back of his head, headache and neck  pain.     HEAD CT TECHNIQUE: Spiral CT images were obtained from the base of the  skull to the vertex without intravenous contrast. The images were  reformatted and are submitted in 3 mm thick axial, sagittal and coronal  CT sections with brain algorithm and 2 mm thick axial CT sections with  high-resolution bone algorithm.     This is correlated to a remote prior noncontrast head CT from [facility] on 11/02/2004, 18 years ago.     FINDINGS: This patient has had a remote prior 7.5 x 7 cm lateral left  frontotemporoparietal craniotomy. There is a low-density fluid density  resection cavity extending from the anterior inferior medial left  frontal lobe extending into the posterior two-thirds of the left gyrus  rectus, extends laterally into the left anterior perforated substance  and inferior left frontal lobe and into the inferior aspect of the left  frontotemporal junction. The well-circumscribed fluid density cavity  measures 4.4 x 4.4 x 2.6 cm in anterior posterior, medial lateral and  craniocaudal dimension. It may  communicate with the inferior aspect of  the frontal horn of the left lateral ventricle as well as the anterior  left Sylvian fissure and is unchanged dating back to head CT 18 years  ago on 11/02/2004. There is a chronic epidural fluid collection  overlying the anterior left frontal lobe measuring 4-5 mm in anterior  posterior thickness. There is some mild patchy low-density in the  periventricular white matter consistent with mild small vessel disease.  The ventricles are normal in size. I see no mass effect, no midline  shift. No acute intracranial hemorrhage is identified. No acute skull  fracture is identified. The paranasal sinuses and the mastoid air cells  and the middle ear cavities are clear.       Impression:      1. No acute intracranial abnormality is identified with no significant  change dating all the way back to a head CT 18 years ago on 11/02/2004.  2. Patient has had a remote prior 7.5 x 7 cm lateral left  frontotemporoparietal craniotomy and has a well-circumscribed CSF  density resection cavity extending from the anterior inferior medial  left frontal lobe laterally to the anterior margin of the left Sylvian  fissure and may communicate with the anterior left Sylvian fissure and  appears to communicate with the inferior aspect of the frontal horn of  the left lateral ventricle and correlation with prior surgical history  is suggested. The remainder of the head CT is within normal limits with  no acute skull fracture or intracranial hemorrhage identified.     CERVICAL SPINE CT TECHNIQUE: Spiral CT images were obtained from the  skull base down to the midbody of the T3 thoracic level and the images  were reformatted and submitted in 2 mm thick axial and sagittal CT  sections with soft tissue algorithm and 1 mm thick axial, sagittal and  coronal CT sections with high-resolution bone algorithm.     There are no prior cervical spine imaging studies from [facility] for comparison.      FINDINGS: The atlantooccipital articulation is within normal limits.     At C1-2 there are arthritic changes at the atlantodental interval with  narrowing of the interval and marginal spurring off the anterior ring of  C1 and the odontoid. Otherwise the C1-2 level is normal in appearance.     At C2-3 the posterior disc margin, facets and uncovertebral joints are  normal with no canal or foraminal narrowing.     At C3-4 there is mild left and there is moderate right facet overgrowth.  There is reversal of the normal cervical lordosis centered at the C3-4  level. There is a 1 mm anterolisthesis of C3 on C4, posterior endplate  spurring, mild canal narrowing. The right facet spurs extend into the  right neural foramen and there is moderate-to-severe right bony  foraminal narrowing. There is no left foraminal narrowing at C3-4.     Patient has extensive prior cervical spine surgery from C4 to C7. There  are bilateral rods bridging the posterior elements from C4 to C7  anchored by bilateral articular pillar screws at C4, C5, C6 and C7.  There is some partial bony fusion across the endplates at C4-5, C5-6 and  C6-7. I see no residual canal or foraminal narrowing from C4 to C7.     At C7-T1 there is mild-to-moderate bilateral facet overgrowth. There is  disc space narrowing. There are degenerative endplate changes, posterior  endplate spurring but only minimal if any canal narrowing. There is some  uncovertebral joint spurring and there is minimal bilateral foraminal  narrowing.     No acute fracture is seen in the cervical spine.     IMPRESSION:  1. No acute fracture is seen in the cervical spine.  2. Patient has had extensive prior cervical spine surgery from C4 to C7,  bilateral posterior bridging rods anchored by bilateral articular pillar  screws from C4 to C7 with no residual canal or foraminal narrowing from  C4 to C7. There is slight reversal of the normal cervical lordosis  centered at C3-4 where there is mild  left and there is  moderate-to-severe right facet overgrowth. Right facet spurs moderately  to severely narrow the right neural foramen at C3-4 and there is  reversal of the cervical lordosis at C3-4 with posterior endplate  spurring and mild canal narrowing at C3-4. There is also disc space  narrowing and degenerative endplate changes at C7-T1 with posterior  endplate spurs and uncovertebral joint spurs contributing to minimal  canal and foraminal narrowing at C7-T1. The remainder of the cervical  spine CT is unremarkable.     Radiation dose reduction techniques were utilized, including automated  exposure control and exposure modulation based on body size.     This report was finalized on 12/7/2022 8:50 AM by Dr. Duane Wilkinson M.D.       CT Abdomen Pelvis With Contrast [[id number]] Collected: 12/06/22 0739     Updated: 12/06/22 0747    Narrative:      CT ABDOMEN AND PELVIS WITH IV CONTRAST     HISTORY: Abdominal pain, fall, trauma     TECHNIQUE: Radiation dose reduction techniques were utilized, including  automated exposure control and exposure modulation based on body size.  Axial images were obtained through the abdomen and pelvis after the  administration of IV contrast. Coronal and sagittal reformatted images  obtained.     COMPARISON: None available     FINDINGS:      ABDOMEN:  There is some bronchial wall thickening seen in the bases of the lower  lobes, which may reflect bronchitis. The liver, gallbladder, and spleen  are unremarkable. The left kidney is unremarkable. There is a small cyst  within the right kidney. The adrenal glands and pancreas are  unremarkable. No free fluid or free air.     PELVIS:  Bladder is not well distended. There is no free fluid in the pelvis.  Colon contains a large amount of stool, otherwise unremarkable. Normal  appendix. No acute bony abnormality. Degenerative changes of the lumbar  spine and left hip.       Impression:      1. No acute findings in the abdomen or pelvis  2.  Bronchial wall thickening in the bases of the lower lobes may reflect  bronchitis     Radiation dose reduction techniques were utilized, including automated  exposure control and exposure modulation based on body size.     This report was finalized on 12/6/2022 7:44 AM by Dr. Anil Pena M.D.       XR Chest 1 View [[id number]] Collected: 12/06/22 0620     Updated: 12/06/22 0620    Narrative:        Patient: ALEJO OLIVER  Time Out: 06:19  Exam(s): FILM CXR 1 VIEW     EXAM:    XR Chest, 1 View    CLINICAL HISTORY:     Reason for exam: Acute hypoxemic respiratory failure.    TECHNIQUE:    Frontal view of the chest.    COMPARISON:    No relevant prior studies available.    FINDINGS:    Lungs:  The heart is at the upper limits of normal with evidence of   mild pulmonary vascular congestion pulmonary edema, similar to prior   study.  Interstitial pneumonitis would be included in the differential.    Pleural space:  Unremarkable.  No pneumothorax.    Heart:  Unremarkable.  No cardiomegaly.    Mediastinum:  Unremarkable.    Bones joints: Status post right total shoulder arthroplasty.    IMPRESSION:         The heart is at the upper limits of normal with evidence of mild   pulmonary vascular congestion pulmonary edema, similar to prior study.    Interstitial pneumonitis would be included in the differential.      Impression:          Electronically signed by Francois Sequeira MD on 12-06-22 at 0619        Results from last 7 days   Lab Units 12/06/22  0441   ADENOVIRUS DETECTION BY PCR  Not Detected   CORONAVIRUS 229E  Not Detected   CORONAVIRUS HKU1  Not Detected   CORONAVIRUS NL63  Not Detected   CORONAVIRUS OC43  Not Detected   HUMAN METAPNEUMOVIRUS  Not Detected   HUMAN RHINOVIRUS/ENTEROVIRUS  Not Detected   INFLUENZA B PCR  Not Detected   PARAINFLUENZA 1  Not Detected   PARAINFLUENZA VIRUS 2  Not Detected   PARAINFLUENZA VIRUS 3  Not Detected   PARAINFLUENZA VIRUS 4  Not Detected   BORDETELLA PERTUSSIS PCR   Not Detected   CHLAMYDOPHILA PNEUMONIAE PCR  Not Detected   MYCOPLAMA PNEUMO PCR  Not Detected   INFLUENZA A PCR  Not Detected   RSV, PCR  Not Detected     Results from last 7 days   Lab Units 12/06/22  0739 12/06/22  0737 12/06/22  0456   BLOODCX  No growth at 4 days No growth at 4 days  --    URINECX   --   --  >100,000 CFU/mL Escherichia coli*     TEST  RESULTS PENDING AT DISCHARGE  Pending Labs     Order Current Status    Blood Culture - Blood, Arm, Left Preliminary result    Blood Culture - Blood, Arm, Right Preliminary result          Discharge Exam:  Alert and oriented x 4, in NAD  Supple neck, midline trach  RRR, no m/r/g, no edema  Clear bilaterally, no wheezing, nonlabored  No clubbing or cyanosis     Disposition:  Home    Patient Instructions:      Discharge Medications      New Medications      Instructions Start Date   carvedilol 3.125 MG tablet  Commonly known as: COREG   3.125 mg, Oral, 2 Times Daily With Meals         Changes to Medications      Instructions Start Date   DULoxetine 30 MG capsule  Commonly known as: CYMBALTA  What changed:   · medication strength  · when to take this   60 mg, Oral, Daily With Breakfast      gabapentin 600 MG tablet  Commonly known as: NEURONTIN  What changed:   · how much to take  · when to take this   300 mg, Oral, 2 Times Daily      mirtazapine 15 MG disintegrating tablet  Commonly known as: REMERON SOL-TAB  What changed:   · medication strength  · how to take this   45 mg, Translingual, Nightly      traZODone 50 MG tablet  Commonly known as: DESYREL  What changed:   · medication strength  · how much to take   25 mg, Oral, Nightly         Continue These Medications      Instructions Start Date   albuterol sulfate  (90 Base) MCG/ACT inhaler  Commonly known as: PROVENTIL HFA;VENTOLIN HFA;PROAIR HFA   2 puffs, Inhalation, Every 4 Hours PRN      finasteride 5 MG tablet  Commonly known as: PROSCAR   5 mg, Oral, Every Morning      lisinopril 10 MG tablet  Commonly  known as: PRINIVIL,ZESTRIL   5 mg, Oral, Every Morning      rosuvastatin 20 MG tablet  Commonly known as: CRESTOR   40 mg, Oral, Nightly      traMADol 50 MG tablet  Commonly known as: ULTRAM   50 mg, Oral, Every 6 Hours PRN      vitamin B-12 1000 MCG tablet  Commonly known as: CYANOCOBALAMIN   1,000 mcg, Oral, Every Morning         Stop These Medications    Chlorhexidine Gluconate 2 % pads     cyclobenzaprine 10 MG tablet  Commonly known as: FLEXERIL     diclofenac-miSOPROStol 75-0.2 MG EC tablet  Commonly known as: ARTHROTEC 75     mupirocin 2 % nasal ointment  Commonly known as: BACTROBAN     omeprazole 40 MG capsule  Commonly known as: priLOSEC     VITAMIN D PO             Your medication list      START taking these medications      Instructions Last Dose Given Next Dose Due   carvedilol 3.125 MG tablet  Commonly known as: COREG      Take 1 tablet by mouth 2 (Two) Times a Day With Meals.          CHANGE how you take these medications      Instructions Last Dose Given Next Dose Due   DULoxetine 30 MG capsule  Commonly known as: CYMBALTA  What changed:   · medication strength  · when to take this      Take 2 capsules by mouth Daily With Breakfast.       gabapentin 600 MG tablet  Commonly known as: NEURONTIN  What changed:   · how much to take  · when to take this      Take 0.5 tablets by mouth 2 (Two) Times a Day.       mirtazapine 15 MG disintegrating tablet  Commonly known as: REMERON SOL-TAB  What changed:   · medication strength  · how to take this      Place 3 tablets on the tongue Every Night.       traZODone 50 MG tablet  Commonly known as: DESYREL  What changed:   · medication strength  · how much to take      Take 0.5 tablets by mouth Every Night.          CONTINUE taking these medications      Instructions Last Dose Given Next Dose Due   albuterol sulfate  (90 Base) MCG/ACT inhaler  Commonly known as: PROVENTIL HFA;VENTOLIN HFA;PROAIR HFA      Inhale 2 puffs Every 4 (Four) Hours As Needed for  Wheezing or Shortness of Air.       finasteride 5 MG tablet  Commonly known as: PROSCAR      Take 5 mg by mouth Every Morning.       lisinopril 10 MG tablet  Commonly known as: PRINIVIL,ZESTRIL      Take 5 mg by mouth Every Morning.       rosuvastatin 20 MG tablet  Commonly known as: CRESTOR      Take 40 mg by mouth Every Night.       traMADol 50 MG tablet  Commonly known as: ULTRAM      Take 50 mg by mouth Every 6 (Six) Hours As Needed for Moderate Pain .       vitamin B-12 1000 MCG tablet  Commonly known as: CYANOCOBALAMIN      Take 1,000 mcg by mouth Every Morning.          STOP taking these medications    Chlorhexidine Gluconate 2 % pads        cyclobenzaprine 10 MG tablet  Commonly known as: FLEXERIL        diclofenac-miSOPROStol 75-0.2 MG EC tablet  Commonly known as: ARTHROTEC 75        mupirocin 2 % nasal ointment  Commonly known as: BACTROBAN        omeprazole 40 MG capsule  Commonly known as: priLOSEC        VITAMIN D PO              Where to Get Your Medications      These medications were sent to [facility]/pharmacy #[id number] - [place], [place] - [address] AT [facility] - [phone] [facility] [phone]   [address] [place] [address]    Phone: [phone]   · carvedilol 3.125 MG tablet  · DULoxetine 30 MG capsule  · gabapentin 600 MG tablet  · mirtazapine 15 MG disintegrating tablet  · traZODone 50 MG tablet             Medication Reconciliation: Please see electronically completed Med Rec.    Total time spent discharging patient including evaluation, medication reconciliation, arranging follow up, and post hospitalization instructions and education to patient and family total time exceeds 30 minutes.    Signed:  Piero Green MD  12/10/2022  15:02 EST

## 2022-12-10 NOTE — DISCHARGE INSTR - APPOINTMENTS
Dr. So  [facility]  [address]  [facility] [address]  [place] [address]  P: [phone]    Please call Monday to make appointment in 1 month.

## 2022-12-10 NOTE — DISCHARGE PLACEMENT REQUEST
"Alejo Oliver (72 y.o. Male)     Date of Birth   1950    Social Security Number       Address   [address] [place] [address]    Home Phone   [phone]    MRN   [id number]       Restorationism   None    Marital Status                               Admission Date   12/6/22    Admission Type   Emergency    Admitting Provider       Attending Provider   Piero Green MD    Department, Room/Bed   [address], E660/1       Discharge Date       Discharge Disposition   Home or Self Care    Discharge Destination                               Attending Provider: Piero Green MD    Allergies: Sulfa Antibiotics    Isolation: None   Infection: None   Code Status: CPR    Ht: 172.7 cm (68\")   Wt: 92.7 kg (204 lb 4.8 oz)    Admission Cmt: None   Principal Problem: Fall in home, initial encounter [W19.XXXA,Y92.009]                 Active Insurance as of 12/6/2022     Primary Coverage     Payor Plan Insurance Group Employer/Plan Group    ANTH MEDICARE REPLACEMENT ANTH MEDICARE ADVANTAGE [id number]     Payor Plan Address Payor Plan Phone Number Payor Plan Fax Number Effective Dates    [address] [phone]  1/1/2022 - None Entered    [facility] [address]       Subscriber Name Subscriber Birth Date Member ID       ALEJO OLIVER 1950 [id number]                 Emergency Contacts      (Rel.) Home Phone Work Phone Mobile Phone    Ailin Oliver (Spouse) [phone] -- [phone]              "

## 2022-12-10 NOTE — SIGNIFICANT NOTE
12/10/22 1113   OTHER   Discipline physical therapist   Rehab Time/Intention   Session Not Performed other (see comments)  (Pt reports he has been walking in room independently without difficulty and reports no strength or balance deficits at this time. Acute care PT will sign off at this time as pt states he is at baseline function.)

## 2022-12-10 NOTE — PLAN OF CARE
Pt up to chair most of day, vs stable, spouse at bedside for transport      Problem: Skin Injury Risk Increased  Goal: Skin Health and Integrity  Outcome: Met  Intervention: Optimize Skin Protection  Recent Flowsheet Documentation  Taken 12/10/2022 1400 by Lisa Mckeon RN  Head of Bed (HOB) Positioning: HOB elevated  Taken 12/10/2022 0835 by Lisa Mckeon RN  Pressure Reduction Techniques: frequent weight shift encouraged  Head of Bed (HOB) Positioning: HOB elevated  Pressure Reduction Devices: pressure-redistributing mattress utilized     Problem: Fall Injury Risk  Goal: Absence of Fall and Fall-Related Injury  Outcome: Met  Intervention: Identify and Manage Contributors  Recent Flowsheet Documentation  Taken 12/10/2022 0835 by Lisa Mckeon RN  Medication Review/Management: medications reviewed  Self-Care Promotion:   independence encouraged   BADL personal objects within reach   BADL personal routines maintained  Intervention: Promote Injury-Free Environment  Recent Flowsheet Documentation  Taken 12/10/2022 0835 by Lisa Mckeon RN  Safety Promotion/Fall Prevention:   activity supervised   assistive device/personal items within reach   clutter free environment maintained   fall prevention program maintained   nonskid shoes/slippers when out of bed   room organization consistent   safety round/check completed     Problem: COPD (Chronic Obstructive Pulmonary Disease) Comorbidity  Goal: Maintenance of COPD Symptom Control  Outcome: Met  Intervention: Maintain COPD-Symptom Control  Recent Flowsheet Documentation  Taken 12/10/2022 0835 by Lisa Mckeon RN  Medication Review/Management: medications reviewed     Problem: Hypertension Comorbidity  Goal: Blood Pressure in Desired Range  Outcome: Met  Intervention: Maintain Blood Pressure Management  Recent Flowsheet Documentation  Taken 12/10/2022 0835 by Lisa Mckeon RN  Medication Review/Management: medications reviewed     Problem: Obstructive Sleep  Apnea Risk or Actual Comorbidity Management  Goal: Unobstructed Breathing During Sleep  Outcome: Met     Problem: Adult Inpatient Plan of Care  Goal: Plan of Care Review  Outcome: Met  Goal: Patient-Specific Goal (Individualized)  Outcome: Met  Goal: Absence of Hospital-Acquired Illness or Injury  Outcome: Met  Intervention: Identify and Manage Fall Risk  Recent Flowsheet Documentation  Taken 12/10/2022 0835 by Lisa Mckeon, RN  Safety Promotion/Fall Prevention:   activity supervised   assistive device/personal items within reach   clutter free environment maintained   fall prevention program maintained   nonskid shoes/slippers when out of bed   room organization consistent   safety round/check completed  Intervention: Prevent Skin Injury  Recent Flowsheet Documentation  Taken 12/10/2022 1400 by Lisa Mckeon RN  Body Position: position changed independently  Taken 12/10/2022 0835 by Lisa Mckeon RN  Body Position: position changed independently  Intervention: Prevent and Manage VTE (Venous Thromboembolism) Risk  Recent Flowsheet Documentation  Taken 12/10/2022 1200 by Lisa Mckeon RN  Activity Management: up in chair  Taken 12/10/2022 1000 by Lisa Mckeon RN  Activity Management: up in chair  Taken 12/10/2022 0835 by Lisa Mckeon RN  Activity Management: up in chair  Intervention: Prevent Infection  Recent Flowsheet Documentation  Taken 12/10/2022 0835 by Lisa Mckeon RN  Infection Prevention:   rest/sleep promoted   single patient room provided  Goal: Optimal Comfort and Wellbeing  Outcome: Met  Intervention: Provide Person-Centered Care  Recent Flowsheet Documentation  Taken 12/10/2022 0835 by Lisa Mckeon RN  Trust Relationship/Rapport:   care explained   thoughts/feelings acknowledged   reassurance provided   questions encouraged   questions answered   emotional support provided  Goal: Readiness for Transition of Care  Outcome: Met     Problem: Infection  Goal: Absence of Infection  Signs and Symptoms  Outcome: Met   Goal Outcome Evaluation:

## 2022-12-10 NOTE — CASE MANAGEMENT/SOCIAL WORK
Case Management Discharge Note      Final Note: home, oxygen through [facility]         Selected Continued Care - Discharged on 12/10/2022 Admission date: 12/6/2022 - Discharge disposition: Home or Self Care    Destination    No services have been selected for the patient.              Durable Medical Equipment     Service Provider Selected Services Address Phone Fax Patient Preferred    [facility] [address], [facility] [address] [phone] --          Dialysis/Infusion    No services have been selected for the patient.              Home Medical Care    No services have been selected for the patient.              Therapy    No services have been selected for the patient.              Community Resources    No services have been selected for the patient.              Community & DME    No services have been selected for the patient.                  Transportation Services  Private: Car    Final Discharge Disposition Code: 01 - home or self-care

## 2022-12-11 LAB
BACTERIA SPEC AEROBE CULT: NORMAL
BACTERIA SPEC AEROBE CULT: NORMAL

## 2022-12-14 ENCOUNTER — READMISSION MANAGEMENT (OUTPATIENT)
Dept: CALL CENTER | Facility: HOSPITAL | Age: 72
End: 2022-12-14

## 2022-12-14 NOTE — OUTREACH NOTE
COPD/PN Week 1 Survey    Flowsheet Row Responses   [facility] patient discharged from? [place]   Does the patient have one of the following disease processes/diagnoses(primary or secondary)? COPD   Week 1 attempt successful? Yes   Call start time 1700   Call end time 1706   Discharge diagnosis Fall in home,   COPD exacerbation,   UTI    Meds reviewed with patient/caregiver? Yes   Is the patient having any side effects they believe may be caused by any medication additions or changes? No   Does the patient have all medications ordered at discharge? Yes   Is the patient taking all medications as directed (includes completed medication regime)? Yes   Does the patient have a primary care provider?  Yes   Does the patient have an appointment with their PCP or specialist within 7 days of discharge? No   What is preventing the patient from scheduling follow up appointments within 7 days of discharge? Haven't had time   Nursing Interventions Advised patient to make appointment   Has the patient kept scheduled appointments due by today? N/A   Pulse Ox monitoring None   Did the patient receive a copy of their discharge instructions? Yes   Nursing interventions Reviewed instructions with patient   What is the patient's perception of their health status since discharge? Improving   Nursing Interventions Nurse provided patient education   If the patient is a current smoker, are they able to teach back resources for cessation? Smoking cessation medications, Smoking cessation classes  [States he has cut back]   Is the patient/caregiver able to teach back the hierarchy of who to call/visit for symptoms/problems? PCP, Specialist, Home health nurse, Urgent Care, ED, 911 Yes   Additional teach back comments Staes he is doing well.  They are going to  pulse ox to monitor oxygen levels.    Is the patient able to teach back COPD zones? No   Nursing interventions Education provided on various zones   Patient reports what  zone on this call? Green Zone   Green Zone Reports doing well, Breathing without shortness of breath, Usual activity and exercise level, Usual amounts of cough and phlegm/mucous, Slept well last night, Appetite is good   Green Zone interventions: Take daily medications, Use oxygen as prescribed, Continue regular exercise/diet plan, At all times avoid cigarette smoking, vaping and inhaled irritants   Week 1 call completed? Yes   Wrap up additional comments Pt to make follow up appts with PCP and Pulmonary.          ARIEL ZARAGOZA - Licensed Nurse

## 2022-12-23 ENCOUNTER — READMISSION MANAGEMENT (OUTPATIENT)
Dept: CALL CENTER | Facility: HOSPITAL | Age: 72
End: 2022-12-23

## 2022-12-23 NOTE — OUTREACH NOTE
COPD/PN Week 2 Survey    Flowsheet Row Responses   [facility] patient discharged from? [place]   Does the patient have one of the following disease processes/diagnoses(primary or secondary)? COPD   Week 2 attempt successful? No   Unsuccessful attempts Attempt 1          RUTHIE FRANCIS - Registered Nurse

## 2022-12-26 ENCOUNTER — READMISSION MANAGEMENT (OUTPATIENT)
Dept: CALL CENTER | Facility: HOSPITAL | Age: 72
End: 2022-12-26

## 2022-12-26 NOTE — OUTREACH NOTE
COPD/PN Week 2 Survey    Flowsheet Row Responses   [facility] patient discharged from? [place]   Does the patient have one of the following disease processes/diagnoses(primary or secondary)? COPD   Week 2 attempt successful? Yes   Call start time 1656   Call end time 1659   Discharge diagnosis Fall in home,   COPD exacerbation,   UTI    Meds reviewed with patient/caregiver? Yes   Is the patient having any side effects they believe may be caused by any medication additions or changes? No   Does the patient have all medications ordered at discharge? Yes   Is the patient taking all medications as directed (includes completed medication regime)? Yes   Does the patient have a primary care provider?  Yes   Does the patient have an appointment with their PCP or specialist within 7 days of discharge? No   What is preventing the patient from scheduling follow up appointments within 7 days of discharge? Waiting on return call   Nursing Interventions Educated patient on importance of making appointment, Advised patient to make appointment   Has the patient kept scheduled appointments due by today? N/A   Has home health visited the patient within 72 hours of discharge? N/A   Has all DME been delivered? Yes   Pulse Ox monitoring None   Psychosocial issues? No   Did the patient receive a copy of their discharge instructions? Yes   Nursing interventions Reviewed instructions with patient   What is the patient's perception of their health status since discharge? Improving   Nursing Interventions Nurse provided patient education   If the patient is a current smoker, are they able to teach back resources for cessation? [id number], Smoking cessation medications   Is the patient/caregiver able to teach back the hierarchy of who to call/visit for symptoms/problems? PCP, Specialist, Home health nurse, Urgent Care, ED, 911 Yes   Is the patient able to teach back COPD zones? Yes   Nursing interventions Education provided on  various zones   Patient reports what zone on this call? Green Zone   Green Zone Appetite is good, Slept well last night, Reports doing well   Green Zone interventions: Take daily medications, Use oxygen as prescribed, At all times avoid cigarette smoking, vaping and inhaled irritants   Week 2 call completed? Yes   Is the patient interested in additional calls from an ambulatory ?  NOTE:  applies to high risk patients requiring additional follow-up. No          LAZARO VELAZCO - Registered Nurse

## 2022-12-29 ENCOUNTER — OFFICE VISIT (OUTPATIENT)
Dept: CARDIOLOGY | Facility: CLINIC | Age: 72
End: 2022-12-29

## 2022-12-29 VITALS
HEIGHT: 68 IN | OXYGEN SATURATION: 99 % | WEIGHT: 208.2 LBS | HEART RATE: 80 BPM | BODY MASS INDEX: 31.55 KG/M2 | DIASTOLIC BLOOD PRESSURE: 70 MMHG | SYSTOLIC BLOOD PRESSURE: 110 MMHG

## 2022-12-29 DIAGNOSIS — M25.552 PAIN OF LEFT HIP JOINT: ICD-10-CM

## 2022-12-29 DIAGNOSIS — J44.1 CHRONIC OBSTRUCTIVE PULMONARY DISEASE WITH ACUTE EXACERBATION: ICD-10-CM

## 2022-12-29 DIAGNOSIS — J96.02 ACUTE RESPIRATORY FAILURE WITH HYPERCAPNIA: ICD-10-CM

## 2022-12-29 DIAGNOSIS — I10 PRIMARY HYPERTENSION: ICD-10-CM

## 2022-12-29 DIAGNOSIS — I21.A1 TYPE 2 ACUTE MYOCARDIAL INFARCTION: Primary | ICD-10-CM

## 2022-12-29 PROBLEM — N39.0 UTI (URINARY TRACT INFECTION): Status: RESOLVED | Noted: 2022-12-06 | Resolved: 2022-12-29

## 2022-12-29 PROBLEM — J06.9 VIRAL UPPER RESPIRATORY TRACT INFECTION: Status: RESOLVED | Noted: 2019-12-05 | Resolved: 2022-12-29

## 2022-12-29 PROBLEM — M16.12 OSTEOARTHRITIS OF LEFT HIP: Status: ACTIVE | Noted: 2022-06-28

## 2022-12-29 PROBLEM — R79.89 ELEVATED BRAIN NATRIURETIC PEPTIDE (BNP) LEVEL: Status: RESOLVED | Noted: 2022-12-06 | Resolved: 2022-12-29

## 2022-12-29 PROBLEM — R77.8 ELEVATED TROPONIN: Status: RESOLVED | Noted: 2022-12-06 | Resolved: 2022-12-29

## 2022-12-29 PROCEDURE — 99214 OFFICE O/P EST MOD 30 MIN: CPT | Performed by: NURSE PRACTITIONER

## 2022-12-29 PROCEDURE — 93000 ELECTROCARDIOGRAM COMPLETE: CPT | Performed by: NURSE PRACTITIONER

## 2022-12-29 RX ORDER — ASPIRIN 81 MG/1
81 TABLET ORAL DAILY
Qty: 30 TABLET | Refills: 0 | Status: SHIPPED | OUTPATIENT
Start: 2022-12-29 | End: 2023-03-28 | Stop reason: HOSPADM

## 2022-12-29 NOTE — PROGRESS NOTES
Date of Office Visit: 2022  Encounter Provider: MARIE Emmanuel  Place of Service: [facility] CARDIOLOGY  Patient Name: Russell Christine  :1950  Primary Cardiologist: Dr. Vikram So    Chief Complaint   Patient presents with   • Hospital Follow Up Visit   :     HPI: Russell Christine is a 72 y.o. male who presents today for follow-up after recent hospitalization.  He is a new patient to me and I have reviewed his medical records.    He has been diagnosed with hypertension, hyperlipidemia, and obstructive sleep apnea on CPAP.    In 2022, he presented to the [facility] ED with confusion, weakness, and possible fall.  He had not been feeling well the day before with a cough and felt like he had the flu.  In the middle the night, his wife heard him calling out and had hit his head on the nightstand.  He was in respiratory distress by the time he reached the ED and was placed on BiPAP.  He was diagnosed with a COPD exacerbation, had metabolic encephalopathy, and had a UTI.    Dr. So consulted for elevated troponin and she said his EKG was nonischemic.  Echocardiogram showed normal EF, no wall motion abnormalities, RV severely dilated and hypokinetic.  CTA showed no pulmonary embolism and mild coronary artery calcification.  Abnormalities on the right side of his heart were felt to be secondary to chronic lung disease and felt to have cor pulmonale with pulmonary hypertension due to severe advanced lung disease.  He was to be discharged on O2 and advised to quit smoking.  Blood pressure was elevated so lisinopril was increased.  He had some metabolic encephalopathy during the hospitalization. Cardiology added aspirin and a beta-blocker.    He presents today for follow-up visit and his wife is accompanying him.  I reviewed with him the hospital records and he verbalized understanding.  He has been a longtime cigarette smoker and was smoking 1 pack/day now a half a  pack daily.  He plans to quit.  He experiences shortness of breath only when walking long distances.  He denies chest pain, PND, orthopnea, cough, wheezing, edema, syncope, or bleeding.  He reports occasional dizziness and fatigue.  He has known obstructive sleep apnea, does not wear his CPAP machine, and is wearing oxygen at nighttime.  His blood pressure and heart rates are normal today.  He reports left hip pain and says he needs to have surgery.  They are questioning if he needs to be on aspirin.      Past Medical History:   Diagnosis Date   • Arthritis    • BPH (benign prostatic hyperplasia)    • COPD (chronic obstructive pulmonary disease) ([facility])    • Depression    • Dizziness     SINCE CRANIOTOMY YRS AGO  1976   • GERD (gastroesophageal reflux disease)    • Hearing loss    • Hyperlipemia    • Hypertension    • Left hip pain    • Legal blindness     LEFT EYE   • Personal history of benign neoplasm of the brain    • Psoriasis     ARMS, SCALP AND BACK   • Seizure ([facility]) 2017    DUE TO CRANIIOTOMY   • Shoulder pain, right    • Sleep apnea     CPAP   • Type 2 acute myocardial infarction ([facility]) 12/1/2022   • Vertigo        Past Surgical History:   Procedure Laterality Date   • CERVICAL FUSION     • COLONOSCOPY     • CRANIOTOMY      BENIGN TUMOR BEHIND LEFT EYE  1976   • LUMBAR FUSION      X 2   • SD RECONSTR TOTAL SHOULDER IMPLANT Right 10/12/2016    Procedure: RT TOTAL SHOULDER REVERSE  ARTHROPLASTY;  Surgeon: Gilberto Pickard MD;  [facility] [place] [facility];  Service: Orthopedics       Social History     Socioeconomic History   • Marital status:    Tobacco Use   • Smoking status: Every Day     Packs/day: 1.00     Years: 48.00     Pack years: 48.00     Types: Cigarettes   • Smokeless tobacco: Never   Vaping Use   • Vaping Use: Never used   Substance and Sexual Activity   • Alcohol use: Yes     Comment: SOCIALLY   • Drug use: No   • Sexual activity: Defer       Family History   Problem Relation Age of Onset   •  Heart attack Father    • Malig Hyperthermia Neg Hx        The following portion of the patient's history were reviewed and updated as appropriate: past medical history, past surgical history, past social history, past family history, allergies, current medications, and problem list.    Review of Systems   Constitutional: Positive for malaise/fatigue and weight loss.   Cardiovascular: Positive for dyspnea on exertion.   Respiratory: Positive for shortness of breath.    Hematologic/Lymphatic: Negative.    Musculoskeletal: Positive for joint pain and myalgias.   Neurological: Positive for excessive daytime sleepiness and dizziness.   Psychiatric/Behavioral: Positive for depression. The patient is nervous/anxious.        Allergies   Allergen Reactions   • Sulfa Antibiotics Swelling         Current Outpatient Medications:   •  albuterol sulfate  (90 Base) MCG/ACT inhaler, Inhale 2 puffs Every 4 (Four) Hours As Needed for Wheezing or Shortness of Air., Disp: 1 inhaler, Rfl: 0  •  carvedilol (COREG) 3.125 MG tablet, Take 1 tablet by mouth 2 (Two) Times a Day With Meals., Disp: 60 tablet, Rfl: 3  •  DULoxetine (CYMBALTA) 30 MG capsule, Take 2 capsules by mouth Daily With Breakfast., Disp: 30 capsule, Rfl: 3  •  finasteride (PROSCAR) 5 MG tablet, Take 5 mg by mouth Every Morning., Disp: , Rfl:   •  gabapentin (NEURONTIN) 600 MG tablet, Take 0.5 tablets by mouth 2 (Two) Times a Day., Disp: 60 tablet, Rfl: 3  •  lisinopril (PRINIVIL,ZESTRIL) 10 MG tablet, Take 5 mg by mouth Every Morning., Disp: , Rfl:   •  rosuvastatin (CRESTOR) 20 MG tablet, Take 40 mg by mouth Every Night., Disp: , Rfl:   •  traMADol (ULTRAM) 50 MG tablet, Take 50 mg by mouth Every 6 (Six) Hours As Needed for Moderate Pain ., Disp: , Rfl:   •  traZODone (DESYREL) 50 MG tablet, Take 0.5 tablets by mouth Every Night., Disp: 30 tablet, Rfl: 3  •  vitamin B-12 (CYANOCOBALAMIN) 1000 MCG tablet, Take 1,000 mcg by mouth Every Morning., Disp: , Rfl:   •   "aspirin 81 MG EC tablet, Take 1 tablet by mouth Daily., Disp: 30 tablet, Rfl: 0         Objective:     Vitals:    12/29/22 1014   BP: 110/70   BP Location: Left arm   Patient Position: Sitting   Cuff Size: Adult   Pulse: 80   SpO2: 99%   Weight: 94.4 kg (208 lb 3.2 oz)   Height: 172.7 cm (68\")     Body mass index is 31.66 kg/m².    PHYSICAL EXAM:    Vitals Reviewed.   General Appearance: No acute distress, well developed and well nourished. Obese.   Eyes: Conjunctiva and lids: No erythema, swelling, or discharge. Sclera non-icteric. Glasses.   HENT: Atraumatic, normocephalic. External eyes, ears, and nose normal. No hearing loss noted. Mucous membranes normal. Lips not cyanotic. Neck supple with no tenderness. Wearing mask.   Respiratory: No signs of respiratory distress. Respiration rhythm and depth normal.   Clear to auscultation. No rales, crackles, rhonchi, or wheezing auscultated.   Cardiovascular:  Jugular Venous Pressure: Normal  Heart Rate and Rhythm: Normal, Heart Sounds: Normal S1 and S2. No S3 or S4 noted.  Murmurs: No murmurs noted. No rubs, thrills, or gallops.   Lower Extremities: No edema noted.  Gastrointestinal:  Abdomen soft, non-distended, non-tender.    Musculoskeletal: Normal movement of extremities.  Skin and Nails: General appearance normal. No pallor, cyanosis, diaphoresis. Skin temperature normal. No clubbing of fingernails.   Psychiatric: Patient alert and oriented to person, place, and time. Speech and behavior appropriate. Normal mood and affect.       ECG 12 Lead    Date/Time: 12/29/2022 10:15 AM  Performed by: Vilma Quiñones APRN  Authorized by: Vilma Quiñones APRN   Comparison: compared with previous ECG from 12/6/2022  Similar to previous ECG  Rhythm: sinus rhythm  Rate: normal  BPM: 80  Conduction: conduction normal  ST Segments: ST segments normal  T Waves: T waves normal  QRS axis: right  Other findings: non-specific ST-T wave changes    Clinical impression: non-specific " ECG              Assessment:       Diagnosis Plan   1. Type 2 acute myocardial infarction (HCC)        2. Acute respiratory failure with hypercapnia (HCC)        3. Chronic obstructive pulmonary disease with acute exacerbation (HCC)        4. Primary hypertension        5. Pain of left hip joint               Plan:       1.  Type II acute myocardial infarction: Elevated troponin felt to be due to to respiratory distress during hospitalization December 2022.  Dr. So does not feel that ischemic testing will change our plan as his EKG is normal and he denies anginal symptoms.  Start aspirin 81 mg daily as recommended in the hospital and continue beta-blocker and rosuvastatin.    2.  Acute respiratory failure from COPD, severe pulmonary hypertension, and cor pulmonale.  He denies daily shortness of breath and is wearing oxygen at nighttime.  [facility]    3/4.  COPD and severe pulmonary hypertension.    5.  Hypertension: BP well controlled today.    6.  He says he may need to have left hip surgery.  [facility]    7.  [facility].  He plans to obtain both the pulmonologist and cardiologist [facility].  If he plans to have left hip surgery, [facility] can determine if he is at acceptable risk at that point.  No follow-up necessary in our office.    As always, it has been a pleasure to participate in your patient's care. Thank you.         Sincerely,         MARIE Barton  [facility]      · Dictated utilizing Dragon Dictation  · COVID-19 Precautions - Patient was compliant in wearing a mask. When I saw the patient, I used appropriate personal protective equipment (PPE) including mask and eye shield (standard procedure).  Additionally, I used gown and gloves if indicated.  Hand hygiene was completed before and after seeing the patient.  · I spent 39 minutes reviewing her medical records/testing/previous  office notes/labs, face-to-face interaction with patient, physical examination, formulating the plan of care, and discussion of plan of care with patient.

## 2023-03-14 ENCOUNTER — PRE-ADMISSION TESTING (OUTPATIENT)
Dept: PREADMISSION TESTING | Facility: HOSPITAL | Age: 73
End: 2023-03-14
Payer: MEDICARE

## 2023-03-14 VITALS
HEART RATE: 72 BPM | DIASTOLIC BLOOD PRESSURE: 87 MMHG | OXYGEN SATURATION: 94 % | SYSTOLIC BLOOD PRESSURE: 151 MMHG | BODY MASS INDEX: 30.2 KG/M2 | TEMPERATURE: 99.2 F | RESPIRATION RATE: 24 BRPM | HEIGHT: 68 IN | WEIGHT: 199.3 LBS

## 2023-03-14 LAB
ALBUMIN SERPL-MCNC: 4.1 G/DL (ref 3.5–5.2)
ALBUMIN/GLOB SERPL: 1.6 G/DL
ALP SERPL-CCNC: 59 U/L (ref 39–117)
ALT SERPL W P-5'-P-CCNC: 22 U/L (ref 1–41)
ANION GAP SERPL CALCULATED.3IONS-SCNC: 11.6 MMOL/L (ref 5–15)
AST SERPL-CCNC: 12 U/L (ref 1–40)
BILIRUB SERPL-MCNC: 1.1 MG/DL (ref 0–1.2)
BILIRUB UR QL STRIP: NEGATIVE
BUN SERPL-MCNC: 15 MG/DL (ref 8–23)
BUN/CREAT SERPL: 17.4 (ref 7–25)
CALCIUM SPEC-SCNC: 10.1 MG/DL (ref 8.6–10.5)
CHLORIDE SERPL-SCNC: 97 MMOL/L (ref 98–107)
CLARITY UR: CLEAR
CO2 SERPL-SCNC: 29.4 MMOL/L (ref 22–29)
COLOR UR: YELLOW
CREAT SERPL-MCNC: 0.86 MG/DL (ref 0.76–1.27)
DEPRECATED RDW RBC AUTO: 42.5 FL (ref 37–54)
EGFRCR SERPLBLD CKD-EPI 2021: 92 ML/MIN/1.73
ERYTHROCYTE [DISTWIDTH] IN BLOOD BY AUTOMATED COUNT: 12.5 % (ref 12.3–15.4)
GLOBULIN UR ELPH-MCNC: 2.6 GM/DL
GLUCOSE SERPL-MCNC: 84 MG/DL (ref 65–99)
GLUCOSE UR STRIP-MCNC: NEGATIVE MG/DL
HBA1C MFR BLD: 5.2 % (ref 4.8–5.6)
HCT VFR BLD AUTO: 45.3 % (ref 37.5–51)
HGB BLD-MCNC: 15.1 G/DL (ref 13–17.7)
HGB UR QL STRIP.AUTO: NEGATIVE
INR PPP: 0.97 (ref 0.9–1.1)
KETONES UR QL STRIP: NEGATIVE
LEUKOCYTE ESTERASE UR QL STRIP.AUTO: NEGATIVE
MCH RBC QN AUTO: 31.2 PG (ref 26.6–33)
MCHC RBC AUTO-ENTMCNC: 33.3 G/DL (ref 31.5–35.7)
MCV RBC AUTO: 93.6 FL (ref 79–97)
NITRITE UR QL STRIP: NEGATIVE
PH UR STRIP.AUTO: 6 [PH] (ref 5–8)
PLATELET # BLD AUTO: 171 10*3/MM3 (ref 140–450)
PMV BLD AUTO: 10 FL (ref 6–12)
POTASSIUM SERPL-SCNC: 5.1 MMOL/L (ref 3.5–5.2)
PROT SERPL-MCNC: 6.7 G/DL (ref 6–8.5)
PROT UR QL STRIP: NEGATIVE
PROTHROMBIN TIME: 13 SECONDS (ref 11.7–14.2)
RBC # BLD AUTO: 4.84 10*6/MM3 (ref 4.14–5.8)
SODIUM SERPL-SCNC: 138 MMOL/L (ref 136–145)
SP GR UR STRIP: 1.01 (ref 1–1.03)
UROBILINOGEN UR QL STRIP: NORMAL
WBC NRBC COR # BLD: 7.57 10*3/MM3 (ref 3.4–10.8)

## 2023-03-14 PROCEDURE — 83036 HEMOGLOBIN GLYCOSYLATED A1C: CPT

## 2023-03-14 PROCEDURE — 85610 PROTHROMBIN TIME: CPT

## 2023-03-14 PROCEDURE — 85027 COMPLETE CBC AUTOMATED: CPT

## 2023-03-14 PROCEDURE — 36415 COLL VENOUS BLD VENIPUNCTURE: CPT

## 2023-03-14 PROCEDURE — 81003 URINALYSIS AUTO W/O SCOPE: CPT

## 2023-03-14 PROCEDURE — 80053 COMPREHEN METABOLIC PANEL: CPT

## 2023-03-14 RX ORDER — CHLORHEXIDINE GLUCONATE 500 MG/1
CLOTH TOPICAL
COMMUNITY

## 2023-03-14 RX ORDER — TRAZODONE HYDROCHLORIDE 100 MG/1
200 TABLET ORAL NIGHTLY
COMMUNITY

## 2023-03-14 RX ORDER — ROSUVASTATIN CALCIUM 40 MG/1
40 TABLET, COATED ORAL DAILY
COMMUNITY

## 2023-03-14 RX ORDER — DICLOFENAC SODIUM 75 MG/1
75 TABLET, DELAYED RELEASE ORAL 2 TIMES DAILY
COMMUNITY

## 2023-03-14 RX ORDER — FLUTICASONE PROPIONATE AND SALMETEROL 100; 50 UG/1; UG/1
1 POWDER RESPIRATORY (INHALATION)
COMMUNITY

## 2023-03-14 RX ORDER — OMEPRAZOLE 20 MG/1
20 CAPSULE, DELAYED RELEASE ORAL 2 TIMES DAILY
COMMUNITY

## 2023-03-14 RX ORDER — ERGOCALCIFEROL (VITAMIN D2) 10 MCG
800 TABLET ORAL DAILY
COMMUNITY

## 2023-03-14 RX ORDER — LISINOPRIL 20 MG/1
10 TABLET ORAL DAILY
COMMUNITY

## 2023-03-14 ASSESSMENT — HOOS JR
HOOS JR SCORE: 20
HOOS JR SCORE: 36.931

## 2023-03-14 NOTE — DISCHARGE INSTRUCTIONS
CHLORHEXIDINE CLOTH INSTRUCTIONS  The morning of surgery follow these instructions using the Chlorhexidine cloths you've been given.  These steps reduce bacteria on the body.  Do not use the cloths near your eyes, ears mouth, genitalia or on open wounds.  Throw the cloths away after use but do not try to flush them down a toilet.      Open and remove one cloth at a time from the package.    Leave the cloth unfolded and begin the bathing.  Massage the skin with the cloths using gentle pressure to remove bacteria.  Do not scrub harshly.   Follow the steps below with one 2% CHG cloth per area (6 total cloths).  One cloth for neck, shoulders and chest.  One cloth for both arms, hands, fingers and underarms (do underarms last).  One cloth for the abdomen followed by groin.  One cloth for right leg and foot including between the toes.  One cloth for left leg and foot including between the toes.  The last cloth is to be used for the back of the neck, back and buttocks.    Allow the CHG to air dry 3 minutes on the skin which will give it time to work and decrease the chance of irritation.  The skin may feel sticky until it is dry.  Do not rinse with water or any other liquid or you will lose the beneficial effects of the CHG.  If mild skin irritation occurs, do rinse the skin to remove the CHG.  Report this to the nurse at time of admission.  Do not apply lotions, creams, ointments, deodorants or perfumes after using the clothes. Dress in clean clothes before coming to the hospital.     Take the following medications the morning of surgery:  OMEPRAZOLE, INHALERS, CARVEDILOL, DULOXETINE, GABAPENTIN    Hospital to call day before surgery with arrival time for 3/27/23    If you are on prescription narcotic pain medication to control your pain you may also take that medication the morning of surgery.    General Instructions:  Do not eat solid food after midnight the night before surgery.  You may drink clear liquids day of  surgery but must stop at least one hour before your hospital arrival time.  It is beneficial for you to have a clear drink that contains carbohydrates the day of surgery.  We suggest a 12 to 20 ounce bottle of Gatorade or Powerade for non-diabetic patients or a 12 to 20 ounce bottle of G2 or Powerade Zero for diabetic patients. (Pediatric patients, are not advised to drink a 12 to 20 ounce carbohydrate drink)    Clear liquids are liquids you can see through.  Nothing red in color.     Plain water                               Sports drinks  Sodas                                   Gelatin (Jell-O)  Fruit juices without pulp such as white grape juice and apple juice  Popsicles that contain no fruit or yogurt  Tea or coffee (no cream or milk added)  Gatorade / Powerade  G2 / Powerade Zero    Infants may have breast milk up to four hours before surgery.  Infants drinking formula may drink formula up to six hours before surgery.   Patients who avoid smoking, chewing tobacco and alcohol for 4 weeks prior to surgery have a reduced risk of post-operative complications.  Quit smoking as many days before surgery as you can.  Do not smoke, use chewing tobacco or drink alcohol the day of surgery.   If applicable bring your C-PAP/ BI-PAP machine.  Bring any papers given to you in the doctor’s office.  Wear clean comfortable clothes.  Do not wear contact lenses, false eyelashes or make-up.  Bring a case for your glasses.   Bring crutches or walker if applicable.  Remove all piercings.  Leave jewelry and any other valuables at home.  Hair extensions with metal clips must be removed prior to surgery.  The Pre-Admission Testing nurse will instruct you to bring medications if unable to obtain an accurate list in Pre-Admission Testing.            Preventing a Surgical Site Infection:  For 2 to 3 days before surgery, avoid shaving with a razor because the razor can irritate skin and make it easier to develop an infection.    Any areas  of open skin can increase the risk of a post-operative wound infection by allowing bacteria to enter and travel throughout the body.  Notify your surgeon if you have any skin wounds / rashes even if it is not near the expected surgical site.  The area will need assessed to determine if surgery should be delayed until it is healed.  The night prior to surgery shower using a fresh bar of anti-bacterial soap (such as Dial) and clean washcloth.  Sleep in a clean bed with clean clothing.  Do not allow pets to sleep with you.  Shower on the morning of surgery using a fresh bar of anti-bacterial soap (such as Dial) and clean washcloth.  Dry with a clean towel and dress in clean clothing.  Ask your surgeon if you will be receiving antibiotics prior to surgery.  Make sure you, your family, and all healthcare providers clean their hands with soap and water or an alcohol based hand  before caring for you or your wound.    Day of surgery:  Your arrival time is approximately two hours before your scheduled surgery time.  Upon arrival, a Pre-op nurse and Anesthesiologist will review your health history, obtain vital signs, and answer questions you may have.  The only belongings needed at this time will be a list of your home medications and if applicable your C-PAP/BI-PAP machine.  A Pre-op nurse will start an IV and you may receive medication in preparation for surgery, including something to help you relax.     Please be aware that surgery does come with discomfort.  We want to make every effort to control your discomfort so please discuss any uncontrolled symptoms with your nurse.   Your doctor will most likely have prescribed pain medications.      If you are going home after surgery you will receive individualized written care instructions before being discharged.  A responsible adult must drive you to and from the hospital on the day of your surgery and stay with you for 24 hours.  Discharge prescriptions can be  filled by the hospital pharmacy during regular pharmacy hours.  If you are having surgery late in the day/evening your prescription may be e-prescribed to your pharmacy.  Please verify your pharmacy hours or chose a 24 hour pharmacy to avoid not having access to your prescription because your pharmacy has closed for the day.    If you are staying overnight following surgery, you will be transported to your hospital room following the recovery period.  [facility] has all private rooms.    If you have any questions please call Pre-Admission Testing at [phone].  Deductibles and co-payments are collected on the day of service. Please be prepared to pay the required co-pay, deductible or deposit on the day of service as defined by your plan.    Call your surgeon immediately if you experience any of the following symptoms:  Sore Throat  Shortness of Breath or difficulty breathing  Cough  Chills  Body soreness or muscle pain  Headache  Fever  New loss of taste or smell  Do not arrive for your surgery ill.  Your procedure will need to be rescheduled to another time.  You will need to call your physician before the day of surgery to avoid any unnecessary exposure to hospital staff as well as other patients.

## 2023-03-27 ENCOUNTER — APPOINTMENT (OUTPATIENT)
Dept: GENERAL RADIOLOGY | Facility: HOSPITAL | Age: 73
End: 2023-03-27
Payer: MEDICARE

## 2023-03-27 ENCOUNTER — ANESTHESIA (OUTPATIENT)
Dept: PERIOP | Facility: HOSPITAL | Age: 73
End: 2023-03-27
Payer: MEDICARE

## 2023-03-27 ENCOUNTER — HOSPITAL ENCOUNTER (OUTPATIENT)
Facility: HOSPITAL | Age: 73
Discharge: HOME | End: 2023-03-28
Attending: ORTHOPAEDIC SURGERY | Admitting: ORTHOPAEDIC SURGERY
Payer: MEDICARE

## 2023-03-27 ENCOUNTER — ANESTHESIA EVENT (OUTPATIENT)
Dept: PERIOP | Facility: HOSPITAL | Age: 73
End: 2023-03-27
Payer: MEDICARE

## 2023-03-27 PROBLEM — Z96.649 HIP JOINT REPLACEMENT STATUS: Status: ACTIVE | Noted: 2023-03-27

## 2023-03-27 LAB
ANION GAP SERPL CALCULATED.3IONS-SCNC: 7.5 MMOL/L (ref 5–15)
BUN SERPL-MCNC: 10 MG/DL (ref 8–23)
BUN/CREAT SERPL: 12.7 (ref 7–25)
CALCIUM SPEC-SCNC: 8.7 MG/DL (ref 8.6–10.5)
CHLORIDE SERPL-SCNC: 103 MMOL/L (ref 98–107)
CO2 SERPL-SCNC: 28.5 MMOL/L (ref 22–29)
CREAT SERPL-MCNC: 0.79 MG/DL (ref 0.76–1.27)
EGFRCR SERPLBLD CKD-EPI 2021: 94.4 ML/MIN/1.73
GLUCOSE SERPL-MCNC: 106 MG/DL (ref 65–99)
HCT VFR BLD AUTO: 40.3 % (ref 37.5–51)
HGB BLD-MCNC: 12.5 G/DL (ref 13–17.7)
POTASSIUM SERPL-SCNC: 4.7 MMOL/L (ref 3.5–5.2)
SODIUM SERPL-SCNC: 139 MMOL/L (ref 136–145)

## 2023-03-27 PROCEDURE — 76000 FLUOROSCOPY <1 HR PHYS/QHP: CPT

## 2023-03-27 PROCEDURE — 25010000002 PROPOFOL 10 MG/ML EMULSION: Performed by: REGISTERED NURSE

## 2023-03-27 PROCEDURE — C1776 JOINT DEVICE (IMPLANTABLE): HCPCS | Performed by: ORTHOPAEDIC SURGERY

## 2023-03-27 PROCEDURE — 25010000002 FENTANYL CITRATE (PF) 100 MCG/2ML SOLUTION: Performed by: REGISTERED NURSE

## 2023-03-27 PROCEDURE — 25010000002 ROPIVACAINE PER 1 MG: Performed by: ORTHOPAEDIC SURGERY

## 2023-03-27 PROCEDURE — 97530 THERAPEUTIC ACTIVITIES: CPT

## 2023-03-27 PROCEDURE — 25010000002 DEXAMETHASONE SODIUM PHOSPHATE 20 MG/5ML SOLUTION: Performed by: REGISTERED NURSE

## 2023-03-27 PROCEDURE — G0378 HOSPITAL OBSERVATION PER HR: HCPCS

## 2023-03-27 PROCEDURE — 25010000002 ONDANSETRON PER 1 MG: Performed by: REGISTERED NURSE

## 2023-03-27 PROCEDURE — 73501 X-RAY EXAM HIP UNI 1 VIEW: CPT

## 2023-03-27 PROCEDURE — 85018 HEMOGLOBIN: CPT | Performed by: ORTHOPAEDIC SURGERY

## 2023-03-27 PROCEDURE — 85014 HEMATOCRIT: CPT | Performed by: ORTHOPAEDIC SURGERY

## 2023-03-27 PROCEDURE — 97162 PT EVAL MOD COMPLEX 30 MIN: CPT

## 2023-03-27 PROCEDURE — 25010000002 CLONIDINE PER 1 MG: Performed by: ORTHOPAEDIC SURGERY

## 2023-03-27 PROCEDURE — 25010000002 KETOROLAC TROMETHAMINE PER 15 MG: Performed by: ORTHOPAEDIC SURGERY

## 2023-03-27 PROCEDURE — 25010000002 HYDROMORPHONE 1 MG/ML SOLUTION: Performed by: REGISTERED NURSE

## 2023-03-27 PROCEDURE — 80048 BASIC METABOLIC PNL TOTAL CA: CPT | Performed by: ORTHOPAEDIC SURGERY

## 2023-03-27 PROCEDURE — 25010000002 EPINEPHRINE 1 MG/ML SOLUTION 30 ML VIAL: Performed by: ORTHOPAEDIC SURGERY

## 2023-03-27 PROCEDURE — 25010000002 CEFAZOLIN IN DEXTROSE 2-4 GM/100ML-% SOLUTION: Performed by: ORTHOPAEDIC SURGERY

## 2023-03-27 DEVICE — OXINIUM FEMORAL HEAD 12/14 TAPER                                    36 MM +0
Type: IMPLANTABLE DEVICE | Site: HIP | Status: FUNCTIONAL
Brand: OXINIUM

## 2023-03-27 DEVICE — POLARSTEM STANDARD NON-CEMENTED                                    WITH TI/HA 2
Type: IMPLANTABLE DEVICE | Site: HIP | Status: FUNCTIONAL
Brand: POLARSTEM

## 2023-03-27 DEVICE — DEV CONTRL TISS STRATAFIX SPIRAL MNCRYL UD 3/0 PLS 30CM: Type: IMPLANTABLE DEVICE | Site: HIP | Status: FUNCTIONAL

## 2023-03-27 DEVICE — R3 0 DEGREE XLPE ACETABULAR LINER                                    36MM INNER DIAMETER X OUTER DIAMETER 56MM
Type: IMPLANTABLE DEVICE | Site: HIP | Status: FUNCTIONAL
Brand: R3

## 2023-03-27 DEVICE — R3 3 HOLE ACETABULAR SHELL 56MM
Type: IMPLANTABLE DEVICE | Site: HIP | Status: FUNCTIONAL
Brand: R3 ACETABULAR

## 2023-03-27 DEVICE — IMPLANTABLE DEVICE: Type: IMPLANTABLE DEVICE | Status: FUNCTIONAL

## 2023-03-27 RX ORDER — ONDANSETRON 4 MG/1
4 TABLET, FILM COATED ORAL EVERY 6 HOURS PRN
Status: DISCONTINUED | OUTPATIENT
Start: 2023-03-27 | End: 2023-03-28 | Stop reason: HOSPADM

## 2023-03-27 RX ORDER — DROPERIDOL 2.5 MG/ML
0.62 INJECTION, SOLUTION INTRAMUSCULAR; INTRAVENOUS
Status: DISCONTINUED | OUTPATIENT
Start: 2023-03-27 | End: 2023-03-27 | Stop reason: HOSPADM

## 2023-03-27 RX ORDER — ACETAMINOPHEN 500 MG
1000 TABLET ORAL ONCE
Status: COMPLETED | OUTPATIENT
Start: 2023-03-27 | End: 2023-03-27

## 2023-03-27 RX ORDER — SODIUM CHLORIDE 9 MG/ML
100 INJECTION, SOLUTION INTRAVENOUS CONTINUOUS
Status: DISCONTINUED | OUTPATIENT
Start: 2023-03-27 | End: 2023-03-28 | Stop reason: HOSPADM

## 2023-03-27 RX ORDER — FENTANYL CITRATE 50 UG/ML
25 INJECTION, SOLUTION INTRAMUSCULAR; INTRAVENOUS
Status: DISCONTINUED | OUTPATIENT
Start: 2023-03-27 | End: 2023-03-27 | Stop reason: HOSPADM

## 2023-03-27 RX ORDER — GABAPENTIN 300 MG/1
600 CAPSULE ORAL EVERY 12 HOURS SCHEDULED
Status: DISCONTINUED | OUTPATIENT
Start: 2023-03-27 | End: 2023-03-28 | Stop reason: HOSPADM

## 2023-03-27 RX ORDER — CEFAZOLIN SODIUM 2 G/100ML
2 INJECTION, SOLUTION INTRAVENOUS ONCE
Status: COMPLETED | OUTPATIENT
Start: 2023-03-27 | End: 2023-03-27

## 2023-03-27 RX ORDER — FLUMAZENIL 0.1 MG/ML
0.2 INJECTION INTRAVENOUS AS NEEDED
Status: DISCONTINUED | OUTPATIENT
Start: 2023-03-27 | End: 2023-03-27 | Stop reason: HOSPADM

## 2023-03-27 RX ORDER — TRAZODONE HYDROCHLORIDE 100 MG/1
200 TABLET ORAL NIGHTLY
Status: DISCONTINUED | OUTPATIENT
Start: 2023-03-27 | End: 2023-03-28 | Stop reason: HOSPADM

## 2023-03-27 RX ORDER — HYDROMORPHONE HYDROCHLORIDE 1 MG/ML
0.25 INJECTION, SOLUTION INTRAMUSCULAR; INTRAVENOUS; SUBCUTANEOUS
Status: DISCONTINUED | OUTPATIENT
Start: 2023-03-27 | End: 2023-03-27 | Stop reason: HOSPADM

## 2023-03-27 RX ORDER — FAMOTIDINE 20 MG/1
40 TABLET, FILM COATED ORAL DAILY
Status: DISCONTINUED | OUTPATIENT
Start: 2023-03-27 | End: 2023-03-28 | Stop reason: HOSPADM

## 2023-03-27 RX ORDER — HYDRALAZINE HYDROCHLORIDE 20 MG/ML
5 INJECTION INTRAMUSCULAR; INTRAVENOUS
Status: DISCONTINUED | OUTPATIENT
Start: 2023-03-27 | End: 2023-03-27 | Stop reason: HOSPADM

## 2023-03-27 RX ORDER — DOCUSATE SODIUM 100 MG/1
100 CAPSULE, LIQUID FILLED ORAL 2 TIMES DAILY PRN
Status: DISCONTINUED | OUTPATIENT
Start: 2023-03-27 | End: 2023-03-28 | Stop reason: HOSPADM

## 2023-03-27 RX ORDER — MELOXICAM 15 MG/1
15 TABLET ORAL ONCE
Status: COMPLETED | OUTPATIENT
Start: 2023-03-27 | End: 2023-03-27

## 2023-03-27 RX ORDER — MIDAZOLAM HYDROCHLORIDE 1 MG/ML
0.5 INJECTION INTRAMUSCULAR; INTRAVENOUS
Status: DISCONTINUED | OUTPATIENT
Start: 2023-03-27 | End: 2023-03-27 | Stop reason: HOSPADM

## 2023-03-27 RX ORDER — PROMETHAZINE HYDROCHLORIDE 25 MG/1
25 SUPPOSITORY RECTAL ONCE AS NEEDED
Status: DISCONTINUED | OUTPATIENT
Start: 2023-03-27 | End: 2023-03-27 | Stop reason: HOSPADM

## 2023-03-27 RX ORDER — PHENYLEPHRINE HCL IN 0.9% NACL 1 MG/10 ML
SYRINGE (ML) INTRAVENOUS AS NEEDED
Status: DISCONTINUED | OUTPATIENT
Start: 2023-03-27 | End: 2023-03-27 | Stop reason: SURG

## 2023-03-27 RX ORDER — CARVEDILOL 3.12 MG/1
3.12 TABLET ORAL 2 TIMES DAILY WITH MEALS
Status: DISCONTINUED | OUTPATIENT
Start: 2023-03-27 | End: 2023-03-28 | Stop reason: HOSPADM

## 2023-03-27 RX ORDER — CEFAZOLIN SODIUM 2 G/100ML
2 INJECTION, SOLUTION INTRAVENOUS EVERY 8 HOURS
Status: COMPLETED | OUTPATIENT
Start: 2023-03-27 | End: 2023-03-28

## 2023-03-27 RX ORDER — ONDANSETRON 2 MG/ML
4 INJECTION INTRAMUSCULAR; INTRAVENOUS EVERY 6 HOURS PRN
Status: DISCONTINUED | OUTPATIENT
Start: 2023-03-27 | End: 2023-03-28 | Stop reason: HOSPADM

## 2023-03-27 RX ORDER — PROPOFOL 10 MG/ML
VIAL (ML) INTRAVENOUS AS NEEDED
Status: DISCONTINUED | OUTPATIENT
Start: 2023-03-27 | End: 2023-03-27 | Stop reason: SURG

## 2023-03-27 RX ORDER — SODIUM CHLORIDE, SODIUM LACTATE, POTASSIUM CHLORIDE, CALCIUM CHLORIDE 600; 310; 30; 20 MG/100ML; MG/100ML; MG/100ML; MG/100ML
9 INJECTION, SOLUTION INTRAVENOUS CONTINUOUS
Status: DISCONTINUED | OUTPATIENT
Start: 2023-03-27 | End: 2023-03-28 | Stop reason: HOSPADM

## 2023-03-27 RX ORDER — HYDROCODONE BITARTRATE AND ACETAMINOPHEN 7.5; 325 MG/1; MG/1
1 TABLET ORAL EVERY 4 HOURS PRN
Status: DISCONTINUED | OUTPATIENT
Start: 2023-03-27 | End: 2023-03-27 | Stop reason: HOSPADM

## 2023-03-27 RX ORDER — EPHEDRINE SULFATE 50 MG/ML
5 INJECTION, SOLUTION INTRAVENOUS ONCE AS NEEDED
Status: DISCONTINUED | OUTPATIENT
Start: 2023-03-27 | End: 2023-03-27 | Stop reason: HOSPADM

## 2023-03-27 RX ORDER — NALOXONE HCL 0.4 MG/ML
0.1 VIAL (ML) INJECTION
Status: DISCONTINUED | OUTPATIENT
Start: 2023-03-27 | End: 2023-03-28 | Stop reason: HOSPADM

## 2023-03-27 RX ORDER — ASPIRIN 81 MG/1
81 TABLET ORAL EVERY 12 HOURS SCHEDULED
Status: DISCONTINUED | OUTPATIENT
Start: 2023-03-27 | End: 2023-03-28 | Stop reason: HOSPADM

## 2023-03-27 RX ORDER — LABETALOL HYDROCHLORIDE 5 MG/ML
5 INJECTION, SOLUTION INTRAVENOUS
Status: DISCONTINUED | OUTPATIENT
Start: 2023-03-27 | End: 2023-03-27 | Stop reason: HOSPADM

## 2023-03-27 RX ORDER — ONDANSETRON 2 MG/ML
INJECTION INTRAMUSCULAR; INTRAVENOUS AS NEEDED
Status: DISCONTINUED | OUTPATIENT
Start: 2023-03-27 | End: 2023-03-27 | Stop reason: SURG

## 2023-03-27 RX ORDER — ONDANSETRON 2 MG/ML
4 INJECTION INTRAMUSCULAR; INTRAVENOUS ONCE AS NEEDED
Status: DISCONTINUED | OUTPATIENT
Start: 2023-03-27 | End: 2023-03-27 | Stop reason: HOSPADM

## 2023-03-27 RX ORDER — IPRATROPIUM BROMIDE AND ALBUTEROL SULFATE 2.5; .5 MG/3ML; MG/3ML
3 SOLUTION RESPIRATORY (INHALATION) ONCE AS NEEDED
Status: DISCONTINUED | OUTPATIENT
Start: 2023-03-27 | End: 2023-03-27 | Stop reason: HOSPADM

## 2023-03-27 RX ORDER — LISINOPRIL 10 MG/1
10 TABLET ORAL DAILY
Status: DISCONTINUED | OUTPATIENT
Start: 2023-03-27 | End: 2023-03-28 | Stop reason: HOSPADM

## 2023-03-27 RX ORDER — HYDROCODONE BITARTRATE AND ACETAMINOPHEN 5; 325 MG/1; MG/1
1 TABLET ORAL ONCE AS NEEDED
Status: DISCONTINUED | OUTPATIENT
Start: 2023-03-27 | End: 2023-03-27 | Stop reason: HOSPADM

## 2023-03-27 RX ORDER — FENTANYL CITRATE 50 UG/ML
INJECTION, SOLUTION INTRAMUSCULAR; INTRAVENOUS AS NEEDED
Status: DISCONTINUED | OUTPATIENT
Start: 2023-03-27 | End: 2023-03-27 | Stop reason: SURG

## 2023-03-27 RX ORDER — MAGNESIUM HYDROXIDE 1200 MG/15ML
LIQUID ORAL AS NEEDED
Status: DISCONTINUED | OUTPATIENT
Start: 2023-03-27 | End: 2023-03-27 | Stop reason: HOSPADM

## 2023-03-27 RX ORDER — EPHEDRINE SULFATE 50 MG/ML
INJECTION INTRAVENOUS AS NEEDED
Status: DISCONTINUED | OUTPATIENT
Start: 2023-03-27 | End: 2023-03-27 | Stop reason: SURG

## 2023-03-27 RX ORDER — ROCURONIUM BROMIDE 10 MG/ML
INJECTION, SOLUTION INTRAVENOUS AS NEEDED
Status: DISCONTINUED | OUTPATIENT
Start: 2023-03-27 | End: 2023-03-27 | Stop reason: SURG

## 2023-03-27 RX ORDER — TRANEXAMIC ACID 100 MG/ML
INJECTION, SOLUTION INTRAVENOUS AS NEEDED
Status: DISCONTINUED | OUTPATIENT
Start: 2023-03-27 | End: 2023-03-27 | Stop reason: SURG

## 2023-03-27 RX ORDER — DIPHENHYDRAMINE HYDROCHLORIDE 50 MG/ML
12.5 INJECTION INTRAMUSCULAR; INTRAVENOUS
Status: DISCONTINUED | OUTPATIENT
Start: 2023-03-27 | End: 2023-03-27 | Stop reason: HOSPADM

## 2023-03-27 RX ORDER — PROMETHAZINE HYDROCHLORIDE 25 MG/1
25 TABLET ORAL ONCE AS NEEDED
Status: DISCONTINUED | OUTPATIENT
Start: 2023-03-27 | End: 2023-03-27 | Stop reason: HOSPADM

## 2023-03-27 RX ORDER — FINASTERIDE 5 MG/1
5 TABLET, FILM COATED ORAL EVERY MORNING
Status: DISCONTINUED | OUTPATIENT
Start: 2023-03-27 | End: 2023-03-28 | Stop reason: HOSPADM

## 2023-03-27 RX ORDER — SODIUM CHLORIDE 0.9 % (FLUSH) 0.9 %
3-10 SYRINGE (ML) INJECTION AS NEEDED
Status: DISCONTINUED | OUTPATIENT
Start: 2023-03-27 | End: 2023-03-27 | Stop reason: HOSPADM

## 2023-03-27 RX ORDER — LIDOCAINE HYDROCHLORIDE 10 MG/ML
0.5 INJECTION, SOLUTION EPIDURAL; INFILTRATION; INTRACAUDAL; PERINEURAL ONCE AS NEEDED
Status: DISCONTINUED | OUTPATIENT
Start: 2023-03-27 | End: 2023-03-27 | Stop reason: HOSPADM

## 2023-03-27 RX ORDER — LABETALOL HYDROCHLORIDE 5 MG/ML
INJECTION, SOLUTION INTRAVENOUS AS NEEDED
Status: DISCONTINUED | OUTPATIENT
Start: 2023-03-27 | End: 2023-03-27 | Stop reason: SURG

## 2023-03-27 RX ORDER — DEXAMETHASONE SODIUM PHOSPHATE 4 MG/ML
INJECTION, SOLUTION INTRA-ARTICULAR; INTRALESIONAL; INTRAMUSCULAR; INTRAVENOUS; SOFT TISSUE AS NEEDED
Status: DISCONTINUED | OUTPATIENT
Start: 2023-03-27 | End: 2023-03-27 | Stop reason: SURG

## 2023-03-27 RX ORDER — OXYCODONE HYDROCHLORIDE AND ACETAMINOPHEN 5; 325 MG/1; MG/1
2 TABLET ORAL EVERY 4 HOURS PRN
Status: DISCONTINUED | OUTPATIENT
Start: 2023-03-27 | End: 2023-03-28 | Stop reason: HOSPADM

## 2023-03-27 RX ORDER — SODIUM CHLORIDE 0.9 % (FLUSH) 0.9 %
3 SYRINGE (ML) INJECTION EVERY 12 HOURS SCHEDULED
Status: DISCONTINUED | OUTPATIENT
Start: 2023-03-27 | End: 2023-03-27 | Stop reason: HOSPADM

## 2023-03-27 RX ORDER — OXYCODONE HYDROCHLORIDE AND ACETAMINOPHEN 5; 325 MG/1; MG/1
1 TABLET ORAL EVERY 4 HOURS PRN
Status: DISCONTINUED | OUTPATIENT
Start: 2023-03-27 | End: 2023-03-28 | Stop reason: HOSPADM

## 2023-03-27 RX ORDER — NALOXONE HCL 0.4 MG/ML
0.2 VIAL (ML) INJECTION AS NEEDED
Status: DISCONTINUED | OUTPATIENT
Start: 2023-03-27 | End: 2023-03-27 | Stop reason: HOSPADM

## 2023-03-27 RX ORDER — LIDOCAINE HYDROCHLORIDE 20 MG/ML
INJECTION, SOLUTION INFILTRATION; PERINEURAL AS NEEDED
Status: DISCONTINUED | OUTPATIENT
Start: 2023-03-27 | End: 2023-03-27 | Stop reason: SURG

## 2023-03-27 RX ORDER — MELOXICAM 15 MG/1
15 TABLET ORAL DAILY
Status: DISCONTINUED | OUTPATIENT
Start: 2023-03-27 | End: 2023-03-28 | Stop reason: HOSPADM

## 2023-03-27 RX ADMIN — ROCURONIUM BROMIDE 50 MG: 10 INJECTION, SOLUTION INTRAVENOUS at 08:08

## 2023-03-27 RX ADMIN — SODIUM CHLORIDE, POTASSIUM CHLORIDE, SODIUM LACTATE AND CALCIUM CHLORIDE: 600; 310; 30; 20 INJECTION, SOLUTION INTRAVENOUS at 09:11

## 2023-03-27 RX ADMIN — CEFAZOLIN SODIUM 2 G: 2 INJECTION, SOLUTION INTRAVENOUS at 07:54

## 2023-03-27 RX ADMIN — SODIUM CHLORIDE, POTASSIUM CHLORIDE, SODIUM LACTATE AND CALCIUM CHLORIDE 9 ML/HR: 600; 310; 30; 20 INJECTION, SOLUTION INTRAVENOUS at 23:46

## 2023-03-27 RX ADMIN — DOCUSATE SODIUM 100 MG: 100 CAPSULE, LIQUID FILLED ORAL at 16:00

## 2023-03-27 RX ADMIN — HYDROMORPHONE HYDROCHLORIDE 0.5 MG: 1 INJECTION, SOLUTION INTRAMUSCULAR; INTRAVENOUS; SUBCUTANEOUS at 08:05

## 2023-03-27 RX ADMIN — EPHEDRINE SULFATE 10 MG: 50 INJECTION INTRAVENOUS at 08:56

## 2023-03-27 RX ADMIN — HYDROCODONE BITARTRATE AND ACETAMINOPHEN 1 TABLET: 7.5; 325 TABLET ORAL at 09:58

## 2023-03-27 RX ADMIN — FAMOTIDINE 40 MG: 20 TABLET, FILM COATED ORAL at 16:00

## 2023-03-27 RX ADMIN — EPHEDRINE SULFATE 10 MG: 50 INJECTION INTRAVENOUS at 09:04

## 2023-03-27 RX ADMIN — CEFAZOLIN SODIUM 2 G: 2 INJECTION, SOLUTION INTRAVENOUS at 15:58

## 2023-03-27 RX ADMIN — SUGAMMADEX 200 MG: 100 INJECTION, SOLUTION INTRAVENOUS at 08:50

## 2023-03-27 RX ADMIN — LIDOCAINE HYDROCHLORIDE 100 MG: 20 INJECTION, SOLUTION INFILTRATION; PERINEURAL at 08:05

## 2023-03-27 RX ADMIN — ACETAMINOPHEN 1000 MG: 500 TABLET ORAL at 06:49

## 2023-03-27 RX ADMIN — DULOXETINE HYDROCHLORIDE 90 MG: 60 CAPSULE, DELAYED RELEASE ORAL at 16:00

## 2023-03-27 RX ADMIN — SODIUM CHLORIDE 100 ML/HR: 9 INJECTION, SOLUTION INTRAVENOUS at 12:11

## 2023-03-27 RX ADMIN — SODIUM CHLORIDE, POTASSIUM CHLORIDE, SODIUM LACTATE AND CALCIUM CHLORIDE 9 ML/HR: 600; 310; 30; 20 INJECTION, SOLUTION INTRAVENOUS at 06:49

## 2023-03-27 RX ADMIN — MELOXICAM 15 MG: 15 TABLET ORAL at 06:49

## 2023-03-27 RX ADMIN — DEXAMETHASONE SODIUM PHOSPHATE 8 MG: 4 INJECTION, SOLUTION INTRAMUSCULAR; INTRAVENOUS at 08:11

## 2023-03-27 RX ADMIN — PROPOFOL 100 MG: 10 INJECTION, EMULSION INTRAVENOUS at 08:06

## 2023-03-27 RX ADMIN — Medication 100 MCG: at 09:11

## 2023-03-27 RX ADMIN — SUGAMMADEX 200 MG: 100 INJECTION, SOLUTION INTRAVENOUS at 08:59

## 2023-03-27 RX ADMIN — ASPIRIN 81 MG: 81 TABLET, COATED ORAL at 16:05

## 2023-03-27 RX ADMIN — Medication 100 MCG: at 08:19

## 2023-03-27 RX ADMIN — EPHEDRINE SULFATE 10 MG: 50 INJECTION INTRAVENOUS at 08:59

## 2023-03-27 RX ADMIN — TRANEXAMIC ACID 1000 MG: 1 INJECTION, SOLUTION INTRAVENOUS at 08:15

## 2023-03-27 RX ADMIN — EPHEDRINE SULFATE 10 MG: 50 INJECTION INTRAVENOUS at 09:01

## 2023-03-27 RX ADMIN — PROPOFOL 100 MG: 10 INJECTION, EMULSION INTRAVENOUS at 08:05

## 2023-03-27 RX ADMIN — ONDANSETRON 4 MG: 2 INJECTION INTRAMUSCULAR; INTRAVENOUS at 08:11

## 2023-03-27 RX ADMIN — FENTANYL CITRATE 50 MCG: 50 INJECTION, SOLUTION INTRAMUSCULAR; INTRAVENOUS at 08:38

## 2023-03-27 RX ADMIN — LABETALOL HYDROCHLORIDE 10 MG: 5 INJECTION, SOLUTION INTRAVENOUS at 08:41

## 2023-03-27 RX ADMIN — EPHEDRINE SULFATE 10 MG: 50 INJECTION INTRAVENOUS at 08:52

## 2023-03-27 RX ADMIN — CEFAZOLIN SODIUM 2 G: 2 INJECTION, SOLUTION INTRAVENOUS at 23:44

## 2023-03-27 RX ADMIN — TRANEXAMIC ACID 1000 MG: 1 INJECTION, SOLUTION INTRAVENOUS at 08:56

## 2023-03-27 NOTE — DISCHARGE PLACEMENT REQUEST
"Alejo Oliver (72 y.o. Male)     Date of Birth   1950    Social Security Number       Address   [address] [place] [address]    Home Phone   [phone]    MRN   [id number]       [employer]   None    Marital Status                               Admission Date   3/27/23    Admission Type   Elective    Admitting Provider   Thai King II, MD    Attending Provider   Thai King II, MD    Department, Room/Bed   [address], P878/1       Discharge Date       Discharge Disposition       Discharge Destination                               Attending Provider: Thai King II, MD    Allergies: Sulfa Antibiotics    Isolation: None   Infection: None   Code Status: Prior    Ht: 172.7 cm (68\")   Wt: 90.3 kg (199 lb)    Admission Cmt: None   Principal Problem: Hip joint replacement status [Z96.649]                 Active Insurance as of 3/27/2023     Primary Coverage     Payor Plan Insurance Group Employer/Plan Group    ANTHEM MEDICARE REPLACEMENT ANTHEM MEDICARE ADVANTAGE [id number]     Payor Plan Address Payor Plan Phone Number Payor Plan Fax Number Effective Dates     [address] [phone]  1/1/2022 - None Entered    [facility] [id number]       Subscriber Name Subscriber Birth Date Member ID       ALEJO OLIVER 1950 [id number]                 Emergency Contacts      (Rel.) Home Phone Work Phone Mobile Phone    OliverAilin lima (Spouse) [phone] -- [phone]        \  "

## 2023-03-27 NOTE — OP NOTE
Anterior Total Hip Operative Note  Dr. NESHA King II  [phone]    PATIENT NAME: Russell Christine  MRN: [id number]  : 1950 AGE: 72 y.o. GENDER: male  DATE OF OPERATION: 3/27/2023  PREOPERATIVE DIAGNOSIS: End stage Osteoarthritis  POSTOPERATIVE DIAGNOSIS: Same  SURGEON: Thai King MD  Circulator: Jyoti Max RN  Radiology Technologist: Katiana Guevara RRT  Scrub Person: Jonel Sauceda  Vendor Representative: Mireya Bernabe  Orderly: Aubrey Kat  Assistant: Shira Calvert PA  ANESTHESIA: General  ASSISTANT: MALIK Jordan. This case would not have been possible without another set of skilled surgical hands for retraction, use of instrumentation, and general assistance.  This assistance was vital to the success of the case.   ESTIMATED BLOOD LOSS: 300cc  SPONGE AND NEEDLE COUNT: Correct  INDICATIONS:  Arthritis: This patient was noted to have severe arthritis affecting the operative hip. They failed conservative management and elected to undergo total hip replacement. A discussion of operative versus nonoperative treatment was had. They elected to undergo anterior total hip arthroplasty. The risks of surgery were discussed and included the risk of anesthesia, infection, damage to neurovascular structures, implant loosening/failure, fracture, hardware prominence, dislocation, the need for further procedures, medical complications, and others. No guarantees were made. The patient wished to proceed with surgery and a surgical consent was signed.  COMPONENTS:   · Acetabular Cup: Smith & Nephew R3 acetabular cup: 56 Outer Diameter  · Cup Screws: Smith & Nephew 6.5 mm screws: No Screws Were Used  · Smith & Nephew Neutral Acetabular Liner: Neutral  · Smith & Nephew Polar Femoral Stem: Size 2  · Smith & Nephew Oxinium Head: 36mm +0    PERTINENT FINDINGS: Arthritis: Endstage degenerative arthritis of the femoral head and acetabulum.    DETAILS OF PROCEDURE:   The patient was  met in the preoperative area. The site was marked. The consent and H&P were reviewed. The patient was then wheeled back to the operative suite underwent anesthesia. The [place] table boots were secured to the patients’ feet. The patient was moved onto the [place] table and secured in the supine position. The perineal post was inserted and the boots were secured into the leg holders. Surgical alcohol was used to thoroughly clean the operative area.     The hip and leg was then prepped in the normal sterile fashion, multiple layers of sterile drapes, and surgical space suits for the entire operative team. New outer gloves were used by all sterile surgical team members after final draping. The surgical incision was marked. A surgical timeout was performed.    A Modified Pena-Gunter anterior approach was used. Dissection was carried down to the fascia. The fascia was incised and the tensor fascia curly muscle was retracted laterally and the sartorius medially. The lateral femoral circumflex vessels were identified and cauterized using bovie. The rectus femoris was retracted medially. A capsulotomy was then performed. The capsule was tagged with Ethibond for later repair. Retractors were placed on either side of the femoral neck and dissection was further carried down so that the lesser trochanter could be palpated and the superior rim of the acetabulum could be visualized.    The femoral neck cut was then made from  just proximal to the lesser trochanter medially headed towards the saddle laterally. Care was taken not to extend the cut into the lesser or greater trochanters. The head and neck segment were removed with a corkscrew. The acetabulum was then exposed. The labrum was removed using a kocher and scalpel and excess osteophytes were removed using an osteotome.    The acetabulum was progressively reamed, beginning with medialization and then finalizing the position of the reamer to approximate the final cup position.  Fluoroscopy was used to ensure proper placement of the reamer, including adequate medialization as well as appropriate abduction and anteversion. The real cup was then opened and inserted using fluoroscopy, ensuring good position in terms of abduction and anteversion.     No Screws: Initial press-fit fixation of the cup was very robust and no screws were required for supplemental fixation.    After thorough irrigation and ensuring that no soft tissue was entrapped within the cup, the real liner was snapped into place.    The hook was placed just distal to the greater trochanter. The femur was then externally rotated, extended and adducted under the well leg. Soft tissue releases were performed to gain exposure to the proximal femur. Capsule was released from the saddle and the lateral femur. Care was taken to preserve the short external rotator tendons. The capsule was also released along the medial femur. The hydraulic femoral lift was then used to better expose the femur. Further soft tissue releases were then performed, again ensuring preservation of the short external rotators.    The femur was machined with a cookie cutter osteotome and then a rasp was used to further lateralize the starting point. The sclerotic bone on the lateral shoulder of the femur was removed with a rongeour and curette as needed to protect the greater trochanter. Progressive broaches were inserted until adequate fill had been achieved. Using fluoroscopy, the femoral stem was visualized after trial reduction of the hip. The length and offset were compared to the non-operative hip. Trials of stem size and neck length were trialed until equal leg length and offset were obtained. Additionally hip stability was tested with internal and external rotation of the leg. The leg was stable with at least 90° of external rotation and there was no impingement at 60° of internal rotation. After implantation of the final stem and ball, the leg was once  "again brought into normal anatomic position and relocated. Final x-rays were taken with final implants noting good position of the stem and cup, and no visualized fracture.. The hip was stable upon reduction.    No Prophylactic Cable: Before final reduction of the hip, the proximal femur and femoral calcar was thoroughly visualized to ensure that there was no fracture/crack. The bone quality was thought to be sufficient enough that no prophylactic cable was needed for this case.    An analgesic cocktail was then injected about the hip as well as the surgical dissection area. The capsule was closed.  The fascia was then closed with a running stitch and the skin was closed in layers.  A sterile dressing was applied.    The patient was moved from the [place] table to the r[place]aggett where the boots were removed. The patient was taken to the recovery room in stable condition. There were no complications and the patient tolerated the procedure well.    R \"Jakub\" Fernando CRAIG MD  Orthopaedic Surgery  [facility]  [phone]              "

## 2023-03-27 NOTE — CASE MANAGEMENT/SOCIAL WORK
Discharge Planning Assessment  [facility]     Patient Name: Russell Christine  MRN: [id number]  Today's Date: 3/27/2023    Admit Date: 3/27/2023    Plan: home with [facility]   Discharge Needs Assessment    No documentation.                Discharge Plan     Row Name 03/27/23 1427       [facility]     Patient/Family in Agreement with Plan yes    Plan Comments Spoke with patient at bedside.  Introduced self and explained role.  Facesheet  verified.  Patient lives with his wife, Ailin Christine [phone], in a single story house with 3 steps to enter.  Patient has a cane and walker that he can use as needed.  Asking about a w/c, but explained that PT will eval and CCP will see if one is recommended.  [facility] [place]  [facility] Debby PIMENTEL RN              Continued Care and Services - Admitted Since 3/27/2023     Home Medical Care Coordination complete.    Service Provider Request Status Selected Services Address Phone Fax Patient Preferred    [facility] - [facility] [address]  [place] [phone] --              Expected Discharge Date and Time     Expected Discharge Date Expected Discharge Time    Mar 28, 2023          Demographic Summary    No documentation.                Functional Status    No documentation.                Psychosocial    No documentation.                Abuse/Neglect    No documentation.                Legal    No documentation.                Substance Abuse    No documentation.                Patient Forms    No documentation.                   Debby Rachel RN

## 2023-03-27 NOTE — CASE MANAGEMENT/SOCIAL WORK
Discharge Planning Assessment  [facility]     Patient Name: Russell Christine  MRN: [id number]  Today's Date: 3/27/2023    Admit Date: 3/27/2023    Plan: home with [facility]   Discharge Needs Assessment     Row Name 03/27/23 1432       Living Environment    People in Home spouse    Current Living Arrangements home    Primary Care Provided by self    Provides Primary Care For no one    Family Caregiver if Needed spouse    Quality of Family Relationships helpful;involved;supportive    Able to Return to Prior Arrangements yes       Resource/Environmental Concerns    Resource/Environmental Concerns none    Transportation Concerns none       Transition Planning    Patient/Family Anticipates Transition to home with family    Patient/Family Anticipated Services at Transition none    Transportation Anticipated family or friend will provide       Discharge Needs Assessment    Readmission Within the Last 30 Days no previous admission in last 30 days    Equipment Currently Used at Home cpap;oxygen;cane, straight;rollator    Concerns to be Addressed denies needs/concerns at this time;no discharge needs identified    Anticipated Changes Related to Illness none               Discharge Plan     Row Name 03/27/23 1427       Plan    Plan home with [facility]    Patient/Family in Agreement with Plan yes    Plan Comments Spoke with patient at bedside.  Introduced self and explained role.  Facesheet  verified.  Patient lives with his wife, Ailin Christine [phone], in a single story house with 3 steps to enter.  Patient has a cane and walker that he can use as needed.  Asking about a w/c, but explained that PT will eval and CCP will see if one is recommended.  Referral placed for [facility] and they will follow at [place].  Plan is home with [facility]. Debby PIMENTEL RN              Continued Care and Services - Admitted Since 3/27/2023     Home Medical Care Coordination complete.    Service Provider Request Status Selected  Services Address Phone Fax Patient Preferred    [facility]  [facility] [address] [address] [phone] --              Expected Discharge Date and Time     Expected Discharge Date Expected Discharge Time    Mar 28, 2023          Demographic Summary     Row Name 03/27/23 1432       General Information    Admission Type observation    Arrived From PACU/recovery room    Required Notices Provided Observation Status Notice    Referral Source admission list    Reason for Consult discharge planning    Preferred Language English               Functional Status     Row Name 03/27/23 1432       Functional Status    Usual Activity Tolerance good    Current Activity Tolerance moderate       Functional Status, IADL    Medications independent    Meal Preparation independent    Housekeeping independent    Laundry independent    Shopping independent       Mental Status    General Appearance WDL WDL               Psychosocial    No documentation.                Abuse/Neglect    No documentation.                Legal    No documentation.                Substance Abuse    No documentation.                Patient Forms    No documentation.                   Debby Rachel RN

## 2023-03-27 NOTE — PLAN OF CARE
Goal Outcome Evaluation:  Plan of Care Reviewed With: patient, spouse           Outcome Evaluation: Pt is a 71 y/o M POD0 L LORNA, anterior approach. Pt lives at home with his wife, 3 GEE and no stairs inside. Pt was supine in bed at start of the session, req's Manav for bed mobility. Pt was able to perform STS to RW with Manav and took steps over to the chair. Pt had an episode of his knee buckling while standing that req depA to sit safely in the chair. Pt remains seated [facility] at the end of the session. RN notified of pt's mobility. He demo's dec'd strength, endurance, and balance. He will benefit from skilled PT, rec SNF at D/C based on current mobility and risk for falls.

## 2023-03-27 NOTE — ANESTHESIA POSTPROCEDURE EVALUATION
Patient: Russell Christine    Procedure Summary     Date: 03/27/23 Room / Location: [facility] OR [address] [place] [address] [facility]    Anesthesia Start: 0758 Anesthesia Stop: 0925    Procedure: LEFT TOTAL HIP ARTHROPLASTY ANTERIOR (Left: Hip) Diagnosis:     Surgeons: Thai King II, MD Provider: Jesus Navarrete MD    Anesthesia Type: general ASA Status: 4          Anesthesia Type: general    Vitals  Vitals Value Taken Time   BP 98/68 03/27/23 1015   Temp 36.4 °C (97.5 °F) 03/27/23 0922   Pulse 67 03/27/23 1018   Resp 16 03/27/23 0950   SpO2 100 % 03/27/23 1018   Vitals shown include unvalidated device data.        Post Anesthesia Care and Evaluation    Patient location during evaluation: bedside  Patient participation: complete - patient participated  Level of consciousness: awake and alert  Pain management: adequate    Airway patency: patent  Anesthetic complications: No anesthetic complications  PONV Status: controlled  Cardiovascular status: blood pressure returned to baseline and acceptable  Respiratory status: acceptable  Hydration status: acceptable

## 2023-03-27 NOTE — ANESTHESIA PREPROCEDURE EVALUATION
Anesthesia Evaluation     Patient summary reviewed and Nursing notes reviewed   no history of anesthetic complications:  NPO Solid Status: > 8 hours  NPO Liquid Status: > 2 hours           Airway   Mallampati: II  TM distance: >3 FB  Neck ROM: full  Dental      Pulmonary    (+) a smoker, COPD severe, home oxygen, sleep apnea,   Cardiovascular     ECG reviewed    (+) hypertension, CAD, hyperlipidemia,     ROS comment: Echo: severe pulm HTN    Neuro/Psych  (+) seizures,    GI/Hepatic/Renal/Endo    (+) obesity,  GERD,      Musculoskeletal     Abdominal    Substance History      OB/GYN          Other   arthritis,                    Anesthesia Plan    ASA 4     general     intravenous induction     Anesthetic plan, risks, benefits, and alternatives have been provided, discussed and informed consent has been obtained with: patient.        CODE STATUS:

## 2023-03-27 NOTE — DISCHARGE PLACEMENT REQUEST
"Alejo Oliver (72 y.o. Male)    YOB: 1950  Social Security Number:   Address: [address] [place] [address]  Home Phone: [phone]  MRN: [id number]  Orthodox: None  Marital Status:         Admission Date: 3/27/23  Admission Type: Elective  Admitting Provider: Thai King II, MD  Attending Provider: Thai King II, MD  Department, Room/Bed: [facility] OSC OR, OSC OR/OSC OR  Discharge Date:   Discharge Disposition:   Discharge Destination:               Attending Provider: Thai King II, MD    Allergies: Sulfa Antibiotics    Isolation: None   Infection: None   Code Status: Prior    Ht: 172.7 cm (67.99\")   Wt: 90.4 kg (199 lb 4.7 oz)    Admission Cmt: None   Principal Problem: None                Active Insurance as of 3/27/2023     Primary Coverage     Payor Plan Insurance Group Employer/Plan Group    ANTH MEDICARE REPLACEMENT ANTH MEDICARE ADVANTAGE [id number]     Payor Plan Address Payor Plan Phone Number Payor Plan Fax Number Effective Dates    [address] [phone]  1/1/2022 - None Entered    [facility] [address]       Subscriber Name Subscriber Birth Date Member ID       ALEJO OLIVER 1950 [id number]                 Emergency Contacts      (Rel.) Home Phone Work Phone Mobile Phone    Ailin Oliver (Spouse) [phone] None [phone]            "

## 2023-03-27 NOTE — ANESTHESIA PROCEDURE NOTES
Airway  Urgency: elective    Date/Time: 3/27/2023 8:10 AM  Airway not difficult    General Information and Staff    Patient location during procedure: OR  Anesthesiologist: Jesus Navarrete MD  CRNA/CAA: Alexandru Ramirez CRNA    Indications and Patient Condition  Indications for airway management: airway protection    Preoxygenated: yes  MILS not maintained throughout  Mask difficulty assessment: 1 - vent by mask    Final Airway Details  Final airway type: endotracheal airway      Successful airway: ETT  Cuffed: yes   Successful intubation technique: direct laryngoscopy  Endotracheal tube insertion site: oral  Blade: Stephen  Blade size: 4  ETT size (mm): 7.5  Cormack-Lehane Classification: grade I - full view of glottis  Placement verified by: chest auscultation and capnometry   Cuff volume (mL): 10  Measured from: teeth  ETT/EBT  to teeth (cm): 22  Number of attempts at approach: 1  Assessment: lips, teeth, and gum same as pre-op and atraumatic intubation

## 2023-03-27 NOTE — H&P
Orthopaedic Surgery  History & Physical For Elective Total Hip  Dr. NESHA King II  [phone]    HPI:  Patient is a 72 y.o. Not  or  male who presents with End-stage arthritis of the left hip.  They failed conservative treatment of their hip pain and a thorough discussion of the risks and benefits of surgery was had.  The patient wishes to continue with elective total hip replacement, they were scheduled and are here for surgery. They did get medical clearance as well as a thorough preoperative workup.     MEDICAL HISTORY  Past Medical History:   Diagnosis Date   • Arthritis    • BPH (benign prostatic hyperplasia)    • COPD (chronic obstructive pulmonary disease) (HCC)     EXACERBATION 12/2022  - HOSPITALIZED X3-4 DAYS AT [facility]   • Depression    • Dizziness     SINCE CRANIOTOMY YRS AGO  1976   • GERD (gastroesophageal reflux disease)    • Hearing loss    • History of seizure 2017    NONE SINCE, NO MEDS   • Hyperlipemia    • Hypertension    • Left hip pain    • Legal blindness     LEFT EYE   • On home O2     2L NC AT NIGHT   • Personal history of benign neoplasm of the brain     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;  Service: Orthopedics   ·   Prior to Admission medications    Medication Sig Start Date End Date Taking? Authorizing Provider   carvedilol (COREG) 3.125 MG tablet Take 1 tablet by mouth 2 (Two) Times a Day With Meals. 12/10/22  Yes Piero Green MD    Chlorhexidine Gluconate Cloth 2 % pads Apply  topically. PRIOR TO OR   Yes Raul Lee MD   DULoxetine (CYMBALTA) 30 MG capsule Take 2 capsules by mouth Daily With Breakfast.  Patient taking differently: Take 3 capsules by mouth Daily With Breakfast. 12/10/22  Yes Piero Green MD   finasteride (PROSCAR) 5 MG tablet Take 1 tablet by mouth Every Morning.   Yes Raul Lee MD   Fluticasone-Salmeterol (ADVAIR/WIXELA) 100-50 MCG/ACT DISKUS Inhale 1 puff 2 (Two) Times a Day.   Yes Raul Lee MD   gabapentin (NEURONTIN) 600 MG tablet Take 0.5 tablets by mouth 2 (Two) Times a Day.  Patient taking differently: Take 1 tablet by mouth 3 (Three) Times a Day. 12/10/22  Yes Piero Green MD   lisinopril (PRINIVIL,ZESTRIL) 20 MG tablet Take 10 mg by mouth Daily.   Yes Raul Lee MD   omeprazole (priLOSEC) 20 MG capsule Take 1 capsule by mouth 2 (Two) Times a Day.   Yes Raul Lee MD   rosuvastatin (CRESTOR) 40 MG tablet Take 1 tablet by mouth Daily.   Yes aRul Lee MD   traMADol (ULTRAM) 50 MG tablet Take 1 tablet by mouth Every 6 (Six) Hours As Needed for Moderate Pain.   Yes Raul Lee MD   traZODone (DESYREL) 100 MG tablet Take 2 tablets by mouth Every Night.   Yes Raul Lee MD   Vitamin D, Cholecalciferol, (CHOLECALCIFEROL) 10 MCG (400 UNIT) tablet Take 2 tablets by mouth Daily.   Yes Raul Lee MD   albuterol sulfate  (90 Base) MCG/ACT inhaler Inhale 2 puffs Every 4 (Four) Hours As Needed for Wheezing or Shortness of Air. 12/6/19   Rey Garner MD   aspirin 81 MG EC tablet Take 1 tablet by mouth Daily.  Patient taking differently: Take 1 tablet by mouth Daily. HELD FOR OR 12/29/22   Vilma Quiñones APRN   diclofenac (VOLTAREN) 75 MG EC tablet Take 1 tablet by mouth 2 (Two) Times a Day. TO HOLD 1 WEEK PRIOR TO OR    Raul Lee MD   ·   Allergies   Allergen Reactions   • Sulfa Antibiotics Swelling  "  ·   Most Recent Immunizations   Administered Date(s) Administered   • COVID-19 (MODERNA) 1st, 2nd, 3rd Dose Only 04/21/2021   • COVID-19 (MODERNA) BOOSTER 11/27/2021   • FluLaval/Fluzone >6mos 12/06/2019   • Tdap 12/06/2022   ·   Social History     Tobacco Use   • Smoking status: Every Day     Packs/day: 1.00     Years: 48.00     Pack years: 48.00     Types: Cigarettes   • Smokeless tobacco: Never   Substance Use Topics   • Alcohol use: Yes     Comment: SOCIALLY   ·    Social History     Substance and Sexual Activity   Drug Use No   ·     REVIEW OF SYSTEMS:  · Head: negative for headache  · Respiratory: negative for shortness of breath.   · Cardiovascular: negative for chest pain.   · Gastrointestinal: negative abdominal pain.   · Neurological: negative for LOC  · Psychiatric/Behavioral: negative for memory loss.   · All other systems reviewed and are negative    VITALS: /99 (BP Location: Right arm, Patient Position: Sitting)   Pulse 102   Temp 97.9 °F (36.6 °C) (Oral)   Resp 24   Ht 172.7 cm (67.99\")   Wt 90.4 kg (199 lb 4.7 oz)   SpO2 94%   BMI 30.31 kg/m²  Body mass index is 30.31 kg/m².    PHYSICAL EXAM:   · CONSTITUTIONAL: A&Ox3, No acute distress  · LUNGS: Equal chest rise, no shortness of air  · CARDIOVASCULAR: palpable peripheral pulses  · SKIN: no skin lesions in the area examined  · LYMPH: no lymphadenopathy in the area examined  · EXTREMITY: Hip  · Pulses:  Brisk Capillary Refill  · Sensation: Intact to Saphenous, Sural, Deep Peroneal, Superficial Peroneal, and Tibial Nerves and grossly throughout extremity  · Motor: 5/5 EHL/FHL/TA/GS motor complexes    RADIOLOGY REVIEW:   No radiology results for the last 7 days    LABS:   Results for the past 24 hours: No results found for this or any previous visit (from the past 24 hour(s)).    IMPRESSION:  Patient is a 72 y.o. Not  or  male with end-stage osteoarthritis of the left hip    PLAN:   · Surgery: Elective total hip " arthroplasty  · Consent: The risks and benefits of operative versus nonoperative treatment were discussed. The patient elected to undergo operative treatment of their hip. The risks discussed included but were not limited to blood clots, MI, stroke, other medical complications, infection, dislocation, fracture, damage to neurovascular structures, continued pain, hardware prominence, loss of range of motion, stiffness, need for further procedures, and and risk of anesthesia. No guarantees were made   · Disposition: Elective left Total Hip Arthroplasty today.    Thai King II, MD  Orthopaedic Surgery  [facility]

## 2023-03-28 VITALS
DIASTOLIC BLOOD PRESSURE: 60 MMHG | RESPIRATION RATE: 17 BRPM | WEIGHT: 199 LBS | TEMPERATURE: 97.9 F | SYSTOLIC BLOOD PRESSURE: 99 MMHG | HEIGHT: 68 IN | HEART RATE: 76 BPM | BODY MASS INDEX: 30.16 KG/M2 | OXYGEN SATURATION: 94 %

## 2023-03-28 LAB
HCT VFR BLD AUTO: 33.8 % (ref 37.5–51)
HGB BLD-MCNC: 10.7 G/DL (ref 13–17.7)

## 2023-03-28 PROCEDURE — G0378 HOSPITAL OBSERVATION PER HR: HCPCS

## 2023-03-28 PROCEDURE — 85014 HEMATOCRIT: CPT | Performed by: ORTHOPAEDIC SURGERY

## 2023-03-28 PROCEDURE — 97530 THERAPEUTIC ACTIVITIES: CPT

## 2023-03-28 PROCEDURE — 85018 HEMOGLOBIN: CPT | Performed by: ORTHOPAEDIC SURGERY

## 2023-03-28 RX ORDER — OXYCODONE HYDROCHLORIDE AND ACETAMINOPHEN 5; 325 MG/1; MG/1
1 TABLET ORAL EVERY 4 HOURS PRN
Qty: 50 TABLET | Refills: 0 | Status: SHIPPED | OUTPATIENT
Start: 2023-03-28

## 2023-03-28 RX ORDER — ASPIRIN 81 MG/1
81 TABLET ORAL EVERY 12 HOURS
Qty: 60 TABLET | Refills: 0 | Status: SHIPPED | OUTPATIENT
Start: 2023-03-28 | End: 2023-04-27

## 2023-03-28 RX ORDER — ONDANSETRON 4 MG/1
4 TABLET, FILM COATED ORAL EVERY 6 HOURS PRN
Qty: 30 TABLET | Refills: 0 | Status: SHIPPED | OUTPATIENT
Start: 2023-03-28

## 2023-03-28 RX ADMIN — DOCUSATE SODIUM 100 MG: 100 CAPSULE, LIQUID FILLED ORAL at 08:11

## 2023-03-28 RX ADMIN — CARVEDILOL 3.12 MG: 3.12 TABLET, FILM COATED ORAL at 08:11

## 2023-03-28 RX ADMIN — ASPIRIN 81 MG: 81 TABLET, COATED ORAL at 03:51

## 2023-03-28 RX ADMIN — MELOXICAM 15 MG: 15 TABLET ORAL at 08:11

## 2023-03-28 RX ADMIN — GABAPENTIN 600 MG: 300 CAPSULE ORAL at 08:11

## 2023-03-28 RX ADMIN — OXYCODONE AND ACETAMINOPHEN 1 TABLET: 5; 325 TABLET ORAL at 03:55

## 2023-03-28 RX ADMIN — FAMOTIDINE 40 MG: 20 TABLET, FILM COATED ORAL at 08:11

## 2023-03-28 RX ADMIN — OXYCODONE AND ACETAMINOPHEN 2 TABLET: 5; 325 TABLET ORAL at 12:31

## 2023-03-28 RX ADMIN — LISINOPRIL 10 MG: 10 TABLET ORAL at 08:11

## 2023-03-28 RX ADMIN — FINASTERIDE 5 MG: 5 TABLET, FILM COATED ORAL at 06:31

## 2023-03-28 RX ADMIN — OXYCODONE AND ACETAMINOPHEN 1 TABLET: 5; 325 TABLET ORAL at 08:11

## 2023-03-28 RX ADMIN — DULOXETINE HYDROCHLORIDE 90 MG: 60 CAPSULE, DELAYED RELEASE ORAL at 08:10

## 2023-03-28 NOTE — DISCHARGE INSTRUCTIONS
Total Hip  Discharge Instructions  Dr. NESHA Alcantar” Fernando II  [phone]    INCISION CARE  Wash your hands prior to dressing changes  RHIANNA Wound VAC: Postoperatively you had a RHIANNA Wound Vac placed on the incision. This was placed under sterile conditions in the operating room. It remains in place for 7 days postoperatively. After 7 days, the entire dressing must be removed, including all of the sticky adhesive. The dressing and battery pack provide gentle suction to the incision and provide several benefits over a traditional dressing:  It maintains the sterile environment of the OR and reduces the risk of infection  The suction removes unwanted buildup of blood/hematoma under the skin to reduce swelling  The suction also promotes fresh blood supply to the skin and soft tissue to speed up healing  The postoperative scar is reduced in size  Showering is permitted immediately after surgery, but the battery pack must be protected or removed during the shower.   After 7 days the RHIANNA Wound Vac is removed. If there is no drainage, no dressing is required. If there is some scant drainage a dry bandage can be applied and changed daily until seen in the office or until the drainage stops.   No creams or ointments to the incisions until 4 weeks post op.  Do not touch or pick at the incision  Check incision every day and notify surgeon immediately if any of the following signs or symptoms are seen:  Increase in redness  Increase in swelling around the incision and of the entire extremity  Increase in pain  NEW drainage or oozing from the incision  Pulling apart of the edges of the incision  Increase in overall body temperature (greater than 100.4 degrees)  Zip-Line: your incision was closed with a state of the art device.   Is a non-invasive and easy to use wound closure device that replaces sutures, staples and glue for surgical incisions  It minimizes scarring and eliminating “railroad” marks that come with staples or  sutures  It makes removal as atraumatic as peeling off a bandage  Can be removed at home or by a physical therapist or nurse at 14 days postoperatively    ACTIVITIES  Exercises:  Physical therapy will begin immediately while in the hospital. Patients going to a nursing home will get therapy as part of their care at the SNU/SNF facility. Patients going home may also have a therapist come to the house to help them mobilize until they can safely get to an outpatient therapy facility.  Elevate the affected leg most of the day during the first week post operatively. Caution must be taken to avoid pillow placement directly under the heel of the leg, as this can cause pressure ulcers even with a soft pillow. All pillows and blankets should be placed underneath of the thigh and calf so that the heel is free-floating.  Use cold packs for 20-30 minutes approximately 5 times per day.  You should perform the daily stretching and strengthening exercises as taught by the therapist as often as possible. This can be done many times a day.  Full weight bearing is allowed after surgery. It will be sore/painful to put weight on the leg, but this will help the bone to heal and prevent complications such as pneumonia, bed sores and blood clots. Mobilization is vital to the recovery process.  Activities of Daily Living:  No tub baths, hot tubs, or swimming pools for 4 weeks.  May shower and let water run over the incision immediately after surgery. The battery pack of the RHIANNA Wound Vac must be protected or removed while in the shower. After the RHIANNA is removed 7 days after surgery showering is permitted as long as there is no drainage from the wound.     Restrictions  Weight: It is ok to allow full weight bearing after surgery. Weight on the leg actually quickens the recovery process. While it will be sore/painful to put weight on the leg, it is safe to do so. Hip replacement after hip fracture has a much slower recover process. It can  take months to heal fully from a hip fracture and patients even make some slow benefits up to a year afterwards.   Driving: Many patients have questions about when it is safe to return to driving. The answer is that this is extremely variable. It depends on the extent of the surgery, as well as how quickly you heal. Certainly left leg surgeries make returning to driving easier while right leg surgeries require more extensive rehabilitation before driving can be safe. Until you can press down on the brake hard, and are off of all narcotics, driving is not permitted. Your surgeon cannot “clear” you to return to driving, only you can make the decision when you feel it is safe.    Medications  Anticoagulants: After upper extremity surgery most patients do not require an anticoagulant unless you have another injury that will be keeping you from mobilizing. Lower extremity surgery typically does require use of an anticoagulation medicine.   IF YOU HAD LOWER EXTREMITY SURGERY AND ARE NOT DISCHARGED HOME WITH ANY ANTICOAGULANT MEDICINE YOU SHOULD TAKE ASPIRIN 325mg DAILY FOR 30 DAYS POSTOPERATIVELY.  If you are discharged home with an anticoagulant such as Aspirin, Xarelto, Eliquis, Coumadin, or Lovenox, follow these simple instructions:   Notify surgeon immediately if any kali bleeding is noted in the urine, stool, emesis, or from the nose or the incision. Blood in the stool will often appear as black rather than red. Blood in urine may appear as pink. Blood in emesis may appear as brown/black like coffee grounds.  You will need to apply pressure for longer periods of time to any cuts or abrasions to stop bleeding  Avoid alcohol while taking anticoagulants  Most anticoagulants are to be taken for 30 days postoperatively. After this time, you may stop using them unless instructed otherwise.   If you were already taking an anticoagulant (commonly Aspirin, Coumadin, or Plavix) you will likely be resuming your normal dose  postoperatively and will be continuing that medication at the discretion of the prescribing physician.  Stool Softeners: You will be at greater risk of constipation after surgery due to being less mobile and the pain medications.  Take stool softeners as needed. Over the counter Colace 100 mg 1-2 capsules twice daily can be taken.  If stools become too loose or too frequent, please decreases the dosage or stop the stool softener.  If constipation occurs despite use of stool softeners, you are to continue the stool softeners and add a laxative (Milk of Magnesia 1 ounce daily as needed)  Drink plenty of fluids, and eat fruits and vegetables during your recovery time. Getting up and mobilizing will help the bowels to recover their regular function, as will weaning off of all narcotics when the pain becomes tolerable.  Pain Medications: Utilized after surgery are narcotics. This is some general information about these medications.  CLASSIFICATION: Pain medications are called Opioids and are narcotics  LEGALITIES: It is illegal to share narcotics with others  DRIVING: it is illegal to drive while under the influence of narcotics. Doing so is a DUI.  POTENTIAL SIDE EFFECTS: nausea, vomiting, itching, dizziness, drowsiness, dry mouth, constipation, and difficulty urinating.  POTENTIAL ADVERSE EFFECTS:  Opioid tolerance can develop with use of pain medications and this simply means that it requires more and more of the medication to control pain. However, this is seen more in patients that use opioids for longer periods of time.  Opioid dependence can develop with use of Opioids. People with opioid dependence will experience withdrawal symptoms upon cessation of the medication.  Opioid addiction can develop with use of Opioids. The incidence of this is very unlikely in patients who take the medications as ordered and stop the medications as instructed.  Opioid overdose can be dangerous, but is unlikely when the medication  is taken as ordered and stopped when ordered. It is important not to mix opioids with alcohol as this can lead to over sedation and respiratory difficulty.  DOSAGE:  After the initial surgical pain begins to resolve, you may begin to decrease the pain medication. By the end of a few weeks, you should be off of pain medications.  Refills will not be given by the office during evening hours, on weekends, or after 6 weeks post-op. You are responsible for weaning off of pain medication. You can increase the time between narcotic pills, taking one every 4 then 6 then 8 hours and so on.  To seek refills on pain medications during the initial 6-week post-operative period, you must call the office to request the refill. The office will then notify you when to  the prescription. DO NOT wait until you are out of the medication to request a refill. Prescriptions will not be filled over the weekend and depending on the schedule, it may take a couple days for the prescription to be available. Someone will have to pick the prescription in person at the office.    FOLLOW-UP VISITS  You will need to follow up in the office with your surgeon in 3 weeks, or as instructed elsewhere in your discharge paperwork. Please call this number [phone] to schedule this appointment. If you are going to an SNF/SNU facility, they will arrange for you to follow up in the office.  If you have any concerns or suspected complications prior to your follow up visit, please call the office. Do not wait until your appointment time if you suspect complications. These will need to be addressed in the office promptly.      Thai King II, MD  Orthopaedic Surgery  [facility]

## 2023-03-28 NOTE — PLAN OF CARE
Goal Outcome Evaluation:  Plan of Care Reviewed With: patient, spouse        Progress: improving  Outcome Evaluation: Patient is POD 1 L LORNA anterior. He increased ambulation distance to 40ft using rwx requiring CGA-Manav. MaxA from PT for moving rwx and cuesf or upright posture/LE sequencing. He was Manav for STS from chair with cues for UE placement. Concern with pt safety if he discharges home. Spoke with pt and spouse regarding SNF stay but pt is hesitant and would like to talk with spouse. RN notified. Pt will continue to benefit from skilled PT to address functional deficits.

## 2023-03-28 NOTE — PLAN OF CARE
Goal Outcome Evaluation:  Plan of Care Reviewed With: patient        Progress: improving  Outcome Evaluation: POD#1 OF Left total hip. Dressing to hip intact. VSS. Zero complain of pain at this time. Voiding per urinal. Up assist x2 to the chair. Patient very unsteady. Education provided on blood pressure monitoring and safety. Patient verbalized understanding .

## 2023-03-28 NOTE — CASE MANAGEMENT/SOCIAL WORK
Case Management Discharge Note      [facility]         Selected Continued Care - Discharged on 3/28/2023 Admission date: 3/27/2023 - Discharge disposition: Home or Self Care    Destination    No services have been selected for the patient.              Durable Medical Equipment    No services have been selected for the patient.              Dialysis/Infusion    No services have been selected for the patient.              Home Medical Care Coordination complete.    Service Provider Selected Services Address Phone Fax Patient Preferred    [facility] [address] [facility] [address] [facility] [address] [place] [address] [phone] --          Therapy    No services have been selected for the patient.              Community Resources    No services have been selected for the patient.              Community & DME    No services have been selected for the patient.                  Transportation Services  Private: Car    Final Discharge Disposition Code: 06 - home with home health care

## 2023-03-28 NOTE — CASE MANAGEMENT/SOCIAL WORK
Continued Stay Note  [facility]     Patient Name: Russell Christine  MRN: [id number]  Today's Date: 3/28/2023    Admit Date: 3/27/2023    Plan: home with Jesus    Discharge Plan     Row Name 03/28/23 1004       Plan    Plan Comments PT notes reviewed.  Spoke with patient and wife at bedside.  Discussed SNF.  Wife states that they have discussed it and patient is aware of the risks of returning home.  She will provide assistance, as well as several other family members.  Plan remains home with Jesus .  Suzanne/Jesus updated. Debby PIMENTEL RN               Discharge Codes    No documentation.               Expected Discharge Date and Time     Expected Discharge Date Expected Discharge Time    Mar 28, 2023             Debby Rachel RN

## 2023-03-28 NOTE — DISCHARGE SUMMARY
Orthopaedic Discharge Summary  Dr. NESHA Alcantar” Fernando CRAIG  [phone]    NAME: Russell Christine PCP: Vicente Mathews MD   :  MRN: [id number]  [id number] LOS:  ADMIT: 0 days  3/27/2023   AGE/SEX: 72 y.o. male DC:  today             · Admitting Diagnosis: Hip joint replacement status [Z96.649]    · Surgery Performed: [place] ARTHRP ACETBLR/PROX FEM PROSTC AGRFT/ALGRFT [18051] (LEFT TOTAL HIP ARTHROPLASTY ANTERIOR)    · Discharge Medications:         Discharge Medications      New Medications      Instructions Start Date   ondansetron 4 MG tablet  Commonly known as: Zofran   4 mg, Oral, Every 6 Hours PRN      oxyCODONE-acetaminophen 5-325 MG per tablet  Commonly known as: PERCOCET   1 tablet, Oral, Every 4 Hours PRN         Changes to Medications      Instructions Start Date   aspirin 81 MG EC tablet  What changed: when to take this   81 mg, Oral, Every 12 Hours      DULoxetine 30 MG capsule  Commonly known as: CYMBALTA  What changed: how much to take   60 mg, Oral, Daily With Breakfast      gabapentin 600 MG tablet  Commonly known as: NEURONTIN  What changed:   · how much to take  · when to take this   300 mg, Oral, 2 Times Daily         Continue These Medications      Instructions Start Date   albuterol sulfate  (90 Base) MCG/ACT inhaler  Commonly known as: PROVENTIL HFA;VENTOLIN HFA;PROAIR HFA   2 puffs, Inhalation, Every 4 Hours PRN      carvedilol 3.125 MG tablet  Commonly known as: COREG   3.125 mg, Oral, 2 Times Daily With Meals      Chlorhexidine Gluconate Cloth 2 % pads   Apply externally, PRIOR TO OR      diclofenac 75 MG EC tablet  Commonly known as: VOLTAREN   75 mg, Oral, 2 Times Daily, TO HOLD 1 WEEK PRIOR TO OR      finasteride 5 MG tablet  Commonly known as: PROSCAR   5 mg, Oral, Every Morning      Fluticasone-Salmeterol 100-50 MCG/ACT DISKUS  Commonly known as: ADVAIR/WIXELA   1 puff, Inhalation, 2 Times Daily - RT      lisinopril 20 MG tablet  Commonly known as: PRINIVIL,ZESTRIL   10 mg,  Oral, Daily      omeprazole 20 MG capsule  Commonly known as: priLOSEC   20 mg, Oral, 2 Times Daily      rosuvastatin 40 MG tablet  Commonly known as: CRESTOR   40 mg, Oral, Daily      traMADol 50 MG tablet  Commonly known as: ULTRAM   50 mg, Oral, Every 6 Hours PRN      traZODone 100 MG tablet  Commonly known as: DESYREL   200 mg, Oral, Nightly      Vitamin D (Cholecalciferol) 10 MCG (400 UNIT) tablet  Commonly known as: CHOLECALCIFEROL   800 Units, Oral, Daily             · Vitals:     Vitals:    03/27/23 2049 03/27/23 2154 03/28/23 0204 03/28/23 0608   BP: 123/74 116/66 139/77 111/63   BP Location: Right arm Left arm Right arm Right arm   Patient Position: Lying Lying Lying Lying   Pulse: 75 81 85 82   Resp: 16 16 16 16   Temp:  97 °F (36.1 °C) 98.2 °F (36.8 °C) 98.3 °F (36.8 °C)   TempSrc:  Oral Oral Oral   SpO2: 97% 97% 95% 95%   Weight:       Height:           · Labs:      Admission on 03/27/2023   Component Date Value Ref Range Status   • Glucose 03/27/2023 106 (H)  65 - 99 mg/dL Final   • BUN 03/27/2023 10  8 - 23 mg/dL Final   • Creatinine 03/27/2023 0.79  0.76 - 1.27 mg/dL Final   • Sodium 03/27/2023 139  136 - 145 mmol/L Final   • Potassium 03/27/2023 4.7  3.5 - 5.2 mmol/L Final   • Chloride 03/27/2023 103  98 - 107 mmol/L Final   • CO2 03/27/2023 28.5  22.0 - 29.0 mmol/L Final   • Calcium 03/27/2023 8.7  8.6 - 10.5 mg/dL Final   • BUN/Creatinine Ratio 03/27/2023 12.7  7.0 - 25.0 Final   • Anion Gap 03/27/2023 7.5  5.0 - 15.0 mmol/L Final   • eGFR 03/27/2023 94.4  >60.0 mL/min/1.73 Final   • Hemoglobin 03/27/2023 12.5 (L)  13.0 - 17.7 g/dL Final   • Hematocrit 03/27/2023 40.3  37.5 - 51.0 % Final   • Hemoglobin 03/28/2023 10.7 (L)  13.0 - 17.7 g/dL Final   • Hematocrit 03/28/2023 33.8 (L)  37.5 - 51.0 % Final        No results found.    · Hospital Course:   72 y.o. male was admitted to [facility] to services of Thai King II, MD with Hip joint replacement status [Z96.649] on 3/27/2023  "and underwent [place] ARTHRP ACETBLR/PROX FEM PROSTC AGRFT/ALGRFT [43800] (LEFT TOTAL HIP ARTHROPLASTY ANTERIOR). Post-operatively the patient transferred to the floor where the patient underwent mobilization therapy. Opioids were titrated to achieve appropriate pain management to allow for participation in mobilization exercises. Vital signs and laboratory values are now within safe parameters for discharge. The dressings and/or incision is intact without signs or symptoms of active infection. Operative extremity neurovascular status remains intact as compared to the preoperative exam. Appropriate education re: incision care, activity levels, medications, and follow up visits was completed and all questions were answered. The patient is now deemed stable for discharge.    HOME: The patient progressed well with physical therapy. There were cleared for discharge to home. The patietn was sent home in good condition}.       R \"Jakub\" Fernando CRAIG MD  Orthopaedic Surgery  [facility]  [phone]                                               "

## 2023-03-28 NOTE — THERAPY TREATMENT NOTE
Patient Name: Russell Christine  : 1950    MRN: [id number]                              Today's Date: 3/28/2023       Admit Date: 3/27/2023    Visit Dx: No diagnosis found.  Patient Active Problem List   Diagnosis   • Sleep apnea   • Seizure (HCC)   • Hypertension   • Hyperlipemia   • GERD (gastroesophageal reflux disease)   • BPH (benign prostatic hyperplasia)   • Fall in home, initial encounter   • Acute metabolic encephalopathy   • Acute respiratory failure with hypercapnia (HCC)   • COPD (chronic obstructive pulmonary disease) (HCC)   • Osteoarthritis of left hip   • Pain of left hip joint   • Type 2 acute myocardial infarction (HCC)   • Hip joint replacement status     Past Medical History:   Diagnosis Date   • Arthritis    • BPH (benign prostatic hyperplasia)    • COPD (chronic obstructive pulmonary disease) ([facility])     EXACERBATION 2022  - HOSPITALIZED X3-4 DAYS AT 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 OR [facility] [place];  Service: Orthopedics   • TOTAL HIP ARTHROPLASTY Left 3/27/2023    Procedure: LEFT TOTAL HIP ARTHROPLASTY ANTERIOR;   Surgeon: Thai King II, MD;  Location[place] [facility] OR [facility] [place];  Service: Orthopedics;  Laterality: Left;      General Information     Row Name 03/28/23 0951          Physical Therapy Time and Intention    Document Type therapy note (daily note)  -CB     Mode of Treatment individual therapy;physical therapy  -CB     Row Name 03/28/23 0951          General Information    Existing Precautions/Restrictions fall;hip, anterior;left  -CB     Row Name 03/28/23 0951          Cognition    Orientation Status (Cognition) oriented x 3  -CB     Row Name 03/28/23 0951          Safety Issues, Functional Mobility    Safety Issues Affecting Function (Mobility) insight into deficits/self-awareness;awareness of need for assistance  -CB     Impairments Affecting Function (Mobility) balance;pain;strength;motor planning;endurance/activity tolerance  -CB           User Key  (r) = Recorded By, (t) = Taken By, (c) = Cosigned By    Initials Name Provider Type    CB Tere Baron, PT Physical Therapist               Mobility     Row Name 03/28/23 0951          Bed Mobility    Comment, (Bed Mobility) UIC  -CB     Row Name 03/28/23 0951          Sit-Stand Transfer    Sit-Stand Har[place] (Transfers) minimum assist (75% patient effort);verbal cues  -CB     Assistive Device (Sit-Stand Transfers) walker, front-wheeled  -CB     Comment, (Sit-Stand Transfer) cues for UE placement and pt grinding teeth due to pain throughout session  -CB     Row Name 03/28/23 0951          Gait/Stairs (Locomotion)    [place]an Level (Gait) contact guard;minimum assist (75% patient effort);verbal cues  -CB     Assistive Device (Gait) walker, front-wheeled  -CB     Distance in Feet (Gait) 40ft  -CB     Deviations/Abnormal Patterns (Gait) gait speed decreased;antalgic;stride length decreased;weight shifting decreased  -CB     Bilateral Gait Deviations forward flexed posture  -CB     Comment, (Gait/Stairs) cues for LE sequencing, maxA from PT for safety with  moving rwx, cues for upright posture, followed with chair  -CB     Row Name 03/28/23 0951          Mobility    Extremity Weight-bearing Status left lower extremity  -CB     Left Lower Extremity (Weight-bearing Status) weight-bearing as tolerated (WBAT)  -CB           User Key  (r) = Recorded By, (t) = Taken By, (c) = Cosigned By    Initials Name Provider Type    Tere Obregon, ALEJANDRA Physical Therapist               Obj/Interventions     Row Name 03/28/23 0952          Motor Skills    Therapeutic Exercise --  LORNA protocol x10 reps  -CB     Row Name 03/28/23 0952          Balance    Balance Assessment standing static balance;standing dynamic balance  -CB     Static Standing Balance contact guard;verbal cues  -CB     Dynamic Standing Balance minimal assist;contact guard;verbal cues  -CB     Position/Device Used, Standing Balance supported;walker, front-wheeled  -CB     Balance Interventions sitting;standing;sit to stand;supported;static;dynamic;minimal challenge  -CB           User Key  (r) = Recorded By, (t) = Taken By, (c) = Cosigned By    Initials Name Provider Type    Tere Obregon, PT Physical Therapist               Goals/Plan    No documentation.                Clinical Impression     Row Name 03/28/23 0953          Pain    Pretreatment Pain Rating 8/10  -CB     Posttreatment Pain Rating 8/10  -CB     Pain Location - Side/Orientation Left  -CB     Pain Location incisional  -CB     Pain Location - hip  -CB     Pain Intervention(s) Repositioned;Cold pack;Rest;Ambulation/increased activity;Nursing Notified  -CB     Row Name 03/28/23 0953          Plan of Care Review    Plan of Care Reviewed With patient;spouse  -CB     Progress improving  -CB     Outcome Evaluation Patient is POD 1 L LORNA anterior. He increased ambulation distance to 40ft using rwx requiring CGA-Manav. MaxA from PT for moving rwx and cuesf or upright posture/LE sequencing. He was Manav for STS from chair with cues for UE placement. Concern with  pt safety if he discharges home. Spoke with pt and spouse regarding SNF stay but pt is hesitant and would like to talk with spouse. RN notified. Pt will continue to benefit from skilled PT to address functional deficits.  -CB     Row Name 03/28/23 0953          Positioning and Restraints    Pre-Treatment Position sitting in chair/recliner  -CB     Post Treatment Position chair  -CB     In Chair notified nsg;reclined;call light within reach;encouraged to call for assist;exit alarm on;legs elevated;with family/caregiver  -CB           User Key  (r) = Recorded By, (t) = Taken By, (c) = Cosigned By    Initials Name Provider Type    Tere Obregon, PT Physical Therapist               Outcome Measures     Row Name 03/28/23 0955 03/28/23 0811       How much help from another person do you currently need...    Turning from your back to your side while in flat bed without using bedrails? 3  -CB 3  -KM    Moving from lying on back to sitting on the side of a flat bed without bedrails? 3  -CB 3  -KM    Moving to and from a bed to a chair (including a wheelchair)? 3  -CB 2  -KM    Standing up from a chair using your arms (e.g., wheelchair, bedside chair)? 3  -CB 3  -KM    Climbing 3-5 steps with a railing? 2  -CB 1  -KM    To walk in hospital room? 3  -CB 1  -KM    AM-PAC 6 Clicks Score (PT) 17  -CB 13  -KM    Highest level of mobility 5 --> Static standing  -CB 4 --> Transferred to chair/commode  -KM    Row Name 03/28/23 0955          Functional Assessment    Outcome Measure Options AM-PAC 6 Clicks Basic Mobility (PT)  -CB           User Key  (r) = Recorded By, (t) = Taken By, (c) = Cosigned By    Initials Name Provider Type    Nancy Dubsoe, RN Registered Nurse    Tere Obregon, PT Physical Therapist                             Physical Therapy Education     Title: PT OT SLP Therapies (Done)     Topic: Physical Therapy (Done)     Point: Mobility training (Done)     Learning Progress Summary           Patient  Acceptance, E,TB, VU,NR by CB at 3/28/2023 0955    Acceptance, E, NR by DB at 3/27/2023 1607                   Point: Home exercise program (Done)     Learning Progress Summary           Patient Acceptance, E,TB, VU,NR by CB at 3/28/2023 0955    Acceptance, E, NR by DB at 3/27/2023 1607                   Point: Body mechanics (Done)     Learning Progress Summary           Patient Acceptance, E,TB, VU,NR by CB at 3/28/2023 0955    Acceptance, E, NR by DB at 3/27/2023 1607                   Point: Precautions (Done)     Learning Progress Summary           Patient Acceptance, E,TB, VU,NR by CB at 3/28/2023 0955    Acceptance, E, NR by DB at 3/27/2023 1607                               User Key     Initials Effective Dates Name Provider Type Discipline    DB 06/16/21 -  Cheryl High, PT Physical Therapist PT    CB 10/22/21 -  Tere Baron, PT Physical Therapist PT              PT Recommendation and Plan     Plan of Care Reviewed With: patient, spouse  Progress: improving  Outcome Evaluation: Patient is POD 1 L LORNA anterior. He increased ambulation distance to 40ft using rwx requiring CGA-Manav. MaxA from PT for moving rwx and cuesf or upright posture/LE sequencing. He was Manav for STS from chair with cues for UE placement. Concern with pt safety if he discharges home. Spoke with pt and spouse regarding SNF stay but pt is hesitant and would like to talk with spouse. RN notified. Pt will continue to benefit from skilled PT to address functional deficits.     Time Calculation:    PT Charges     Row Name 03/28/23 0956             Time Calculation    Start Time 0820  -CB      Stop Time 0836  -CB      Time Calculation (min) 16 min  -CB      PT Received On 03/28/23  -CB      PT - Next Appointment 03/29/23  -CB         Time Calculation- PT    Total Timed Code Minutes- PT 16 minute(s)  -CB         Timed Charges    01414 - PT Therapeutic Exercise Minutes 5  -CB      23471 - PT Therapeutic Activity Minutes 11  -CB          Total Minutes    Timed Charges Total Minutes 16  -CB       Total Minutes 16  -CB            User Key  (r) = Recorded By, (t) = Taken By, (c) = Cosigned By    Initials Name Provider Type    Tere Obregon, PT Physical Therapist              Therapy Charges for Today     Code Description Service Date Service Provider Modifiers Qty    [id number]  PT THERAPEUTIC ACT EA 15 MIN 3/28/2023 Tere Baron, PT GP 1          PT G-Codes  Outcome Measure Options: AM-PAC 6 Clicks Basic Mobility (PT)  AM-PAC 6 Clicks Score (PT): 17  PT Discharge Summary  Anticipated Discharge Disposition (PT): skilled nursing facility    Tere Baorn PT  3/28/2023

## 2023-04-07 ENCOUNTER — TELEPHONE (OUTPATIENT)
Dept: ORTHOPEDIC SURGERY | Facility: HOSPITAL | Age: 73
End: 2023-04-07
Payer: MEDICARE

## 2023-04-07 NOTE — TELEPHONE ENCOUNTER
Called and spoke with Mr. Christine' family to see how he is doing as he is SP [facility]. She said he is doing fine. He is working with HH PT and progressing well. Pain is controlled. Dressing looks good. BM's are normal. Things are going as good as can be expected. They don't have any questions for me at this time. She was given my contact information should they need anything.

## 2023-05-26 NOTE — OUTREACH NOTE
Prep Survey    Flowsheet Row Responses   [facility] facility patient discharged from? [facility]   Is LACE score < 7 ? No   Emergency Room discharge w/ pulse ox? No   Eligibility Readm Mgmt   Discharge diagnosis Fall in home,   COPD exacerbation,   UTI    Does the patient have one of the following disease processes/diagnoses(primary or secondary)? COPD   Does the patient have Home health ordered? No   Is there a DME ordered? Yes   What DME was ordered? oxygen through [facility]   Prep survey completed? Yes          HARMAN FRANCIS - Registered Nurse         No

## (undated) DEVICE — RECIPROCATING BLADE HEAVY DUTY LONG, OFFSET  (77.6 X 0.77 X 11.2MM)

## (undated) DEVICE — S/M FLEXIBLE ALEXIS ORTHOPAEDIC PROTECTOR: Brand: ALEXIS® ORTHOPAEDIC PROTECTOR

## (undated) DEVICE — SUT ETHIB 2 CV V37 MS/4 30IN MX69G

## (undated) DEVICE — 3M™ IOBAN™ 2 ANTIMICROBIAL INCISE DRAPE 6640EZ: Brand: IOBAN™ 2

## (undated) DEVICE — TRAP FLD MINIVAC MEGADYNE 100ML

## (undated) DEVICE — GLV SURG SIGNATURE ESSENTIAL PF LTX SZ8.5

## (undated) DEVICE — SOL ISO/ALC 70PCT 4OZ

## (undated) DEVICE — NEEDLE, QUINCKE, 20GX3.5": Brand: MEDLINE

## (undated) DEVICE — 450 ML BOTTLE OF 0.05% CHLORHEXIDINE GLUCONATE IN 99.95% STERILE WATER FOR IRRIGATION, USP AND APPLICATOR.: Brand: IRRISEPT ANTIMICROBIAL WOUND LAVAGE

## (undated) DEVICE — MAT FLR ABSORBENT LG 4FT 10 2.5FT

## (undated) DEVICE — APPL CHLORAPREP HI/LITE 26ML ORNG

## (undated) DEVICE — PATIENT RETURN ELECTRODE, SINGLE USE, CONTACT QUALITY MONITORING, ADULT WITH 9 FT (2.7 M) CORD. FOR PATIENTS WEIGHING OVER 33LBS. (15KG): Brand: MEGADYNE

## (undated) DEVICE — TBG PENCL TELESCP MEGADYNE SMOKE EVAC 10FT

## (undated) DEVICE — GLV SURG PREMIERPRO ORTHO LTX PF SZ8.5 BRN

## (undated) DEVICE — PK ANT HIP 40